# Patient Record
Sex: MALE | NOT HISPANIC OR LATINO | Employment: OTHER | URBAN - METROPOLITAN AREA
[De-identification: names, ages, dates, MRNs, and addresses within clinical notes are randomized per-mention and may not be internally consistent; named-entity substitution may affect disease eponyms.]

---

## 2017-02-08 ENCOUNTER — APPOINTMENT (EMERGENCY)
Dept: RADIOLOGY | Facility: HOSPITAL | Age: 82
End: 2017-02-08
Payer: COMMERCIAL

## 2017-02-08 ENCOUNTER — HOSPITAL ENCOUNTER (EMERGENCY)
Facility: HOSPITAL | Age: 82
Discharge: HOME | End: 2017-02-08
Attending: EMERGENCY MEDICINE | Admitting: EMERGENCY MEDICINE
Payer: COMMERCIAL

## 2017-02-08 VITALS
WEIGHT: 180 LBS | BODY MASS INDEX: 28.19 KG/M2 | HEART RATE: 69 BPM | TEMPERATURE: 98.1 F | RESPIRATION RATE: 16 BRPM | OXYGEN SATURATION: 97 % | SYSTOLIC BLOOD PRESSURE: 157 MMHG | DIASTOLIC BLOOD PRESSURE: 73 MMHG

## 2017-02-08 DIAGNOSIS — I47.1 PAROXYSMAL SVT (SUPRAVENTRICULAR TACHYCARDIA) (HCC): Primary | ICD-10-CM

## 2017-02-08 LAB
ALBUMIN SERPL BCP-MCNC: 3.4 G/DL (ref 3.5–5)
ALP SERPL-CCNC: 85 U/L (ref 46–116)
ALT SERPL W P-5'-P-CCNC: 46 U/L (ref 12–78)
ANION GAP SERPL CALCULATED.3IONS-SCNC: 12 MMOL/L (ref 4–13)
APTT PPP: 30 SECONDS (ref 24–36)
AST SERPL W P-5'-P-CCNC: 25 U/L (ref 5–45)
BASOPHILS # BLD AUTO: 0.03 THOUSANDS/ΜL (ref 0–0.1)
BASOPHILS NFR BLD AUTO: 0 % (ref 0–1)
BILIRUB SERPL-MCNC: 0.6 MG/DL (ref 0.2–1)
BUN SERPL-MCNC: 31 MG/DL (ref 5–25)
CALCIUM SERPL-MCNC: 9.2 MG/DL (ref 8.3–10.1)
CHLORIDE SERPL-SCNC: 103 MMOL/L (ref 100–108)
CO2 SERPL-SCNC: 25 MMOL/L (ref 21–32)
CREAT SERPL-MCNC: 1.95 MG/DL (ref 0.6–1.3)
EOSINOPHIL # BLD AUTO: 0.23 THOUSAND/ΜL (ref 0–0.61)
EOSINOPHIL NFR BLD AUTO: 2 % (ref 0–6)
ERYTHROCYTE [DISTWIDTH] IN BLOOD BY AUTOMATED COUNT: 12.8 % (ref 11.6–15.1)
GFR SERPL CREATININE-BSD FRML MDRD: 33.2 ML/MIN/1.73SQ M
GLUCOSE SERPL-MCNC: 312 MG/DL (ref 65–140)
HCT VFR BLD AUTO: 46.4 % (ref 36.5–49.3)
HGB BLD-MCNC: 16.7 G/DL (ref 12–17)
INR PPP: 1.09 (ref 0.86–1.16)
LYMPHOCYTES # BLD AUTO: 1.53 THOUSANDS/ΜL (ref 0.6–4.47)
LYMPHOCYTES NFR BLD AUTO: 13 % (ref 14–44)
MCH RBC QN AUTO: 32.6 PG (ref 26.8–34.3)
MCHC RBC AUTO-ENTMCNC: 36 G/DL (ref 31.4–37.4)
MCV RBC AUTO: 90 FL (ref 82–98)
MONOCYTES # BLD AUTO: 1.01 THOUSAND/ΜL (ref 0.17–1.22)
MONOCYTES NFR BLD AUTO: 8 % (ref 4–12)
NEUTROPHILS # BLD AUTO: 9.25 THOUSANDS/ΜL (ref 1.85–7.62)
NEUTS SEG NFR BLD AUTO: 77 % (ref 43–75)
NRBC BLD AUTO-RTO: 0 /100 WBCS
PLATELET # BLD AUTO: 189 THOUSANDS/UL (ref 149–390)
PMV BLD AUTO: 10 FL (ref 8.9–12.7)
POTASSIUM SERPL-SCNC: 4.3 MMOL/L (ref 3.5–5.3)
PROT SERPL-MCNC: 7.2 G/DL (ref 6.4–8.2)
PROTHROMBIN TIME: 14.1 SECONDS (ref 12–14.3)
RBC # BLD AUTO: 5.13 MILLION/UL (ref 3.88–5.62)
SODIUM SERPL-SCNC: 140 MMOL/L (ref 136–145)
SPECIMEN SOURCE: NORMAL
TROPONIN I BLD-MCNC: 0.01 NG/ML (ref 0–0.08)
WBC # BLD AUTO: 12.05 THOUSAND/UL (ref 4.31–10.16)

## 2017-02-08 PROCEDURE — 80053 COMPREHEN METABOLIC PANEL: CPT | Performed by: EMERGENCY MEDICINE

## 2017-02-08 PROCEDURE — 84484 ASSAY OF TROPONIN QUANT: CPT

## 2017-02-08 PROCEDURE — 36415 COLL VENOUS BLD VENIPUNCTURE: CPT | Performed by: EMERGENCY MEDICINE

## 2017-02-08 PROCEDURE — 93005 ELECTROCARDIOGRAM TRACING: CPT | Performed by: EMERGENCY MEDICINE

## 2017-02-08 PROCEDURE — 99285 EMERGENCY DEPT VISIT HI MDM: CPT

## 2017-02-08 PROCEDURE — 85025 COMPLETE CBC W/AUTO DIFF WBC: CPT | Performed by: EMERGENCY MEDICINE

## 2017-02-08 PROCEDURE — 85610 PROTHROMBIN TIME: CPT | Performed by: EMERGENCY MEDICINE

## 2017-02-08 PROCEDURE — 85730 THROMBOPLASTIN TIME PARTIAL: CPT | Performed by: EMERGENCY MEDICINE

## 2017-02-08 PROCEDURE — 71010 HB CHEST X-RAY 1 VIEW FRONTAL: CPT

## 2017-02-09 LAB
ATRIAL RATE: 241 BPM
QRS AXIS: -42 DEGREES
QRSD INTERVAL: 88 MS
QT INTERVAL: 260 MS
QTC INTERVAL: 422 MS
T WAVE AXIS: 49 DEGREES
VENTRICULAR RATE: 159 BPM

## 2017-02-11 ENCOUNTER — APPOINTMENT (OUTPATIENT)
Dept: LAB | Facility: MEDICAL CENTER | Age: 82
End: 2017-02-11
Payer: COMMERCIAL

## 2017-02-11 DIAGNOSIS — E78.5 HYPERLIPIDEMIA: ICD-10-CM

## 2017-02-11 DIAGNOSIS — I25.10 ATHEROSCLEROTIC HEART DISEASE OF NATIVE CORONARY ARTERY WITHOUT ANGINA PECTORIS: ICD-10-CM

## 2017-02-11 DIAGNOSIS — I10 ESSENTIAL (PRIMARY) HYPERTENSION: ICD-10-CM

## 2017-02-11 DIAGNOSIS — E11.40 TYPE 2 DIABETES MELLITUS WITH DIABETIC NEUROPATHY (HCC): ICD-10-CM

## 2017-02-11 DIAGNOSIS — E11.21 TYPE 2 DIABETES MELLITUS WITH DIABETIC NEPHROPATHY (HCC): ICD-10-CM

## 2017-02-11 DIAGNOSIS — N18.30 CHRONIC KIDNEY DISEASE, STAGE III (MODERATE) (HCC): ICD-10-CM

## 2017-02-11 LAB
ALBUMIN SERPL BCP-MCNC: 3.9 G/DL (ref 3.5–5)
ALP SERPL-CCNC: 84 U/L (ref 46–116)
ALT SERPL W P-5'-P-CCNC: 38 U/L (ref 12–78)
ANION GAP SERPL CALCULATED.3IONS-SCNC: 6 MMOL/L (ref 4–13)
AST SERPL W P-5'-P-CCNC: 17 U/L (ref 5–45)
BILIRUB SERPL-MCNC: 0.83 MG/DL (ref 0.2–1)
BUN SERPL-MCNC: 30 MG/DL (ref 5–25)
CALCIUM SERPL-MCNC: 9.1 MG/DL (ref 8.3–10.1)
CHLORIDE SERPL-SCNC: 102 MMOL/L (ref 100–108)
CHOLEST SERPL-MCNC: 131 MG/DL (ref 50–200)
CO2 SERPL-SCNC: 30 MMOL/L (ref 21–32)
CREAT SERPL-MCNC: 1.62 MG/DL (ref 0.6–1.3)
CREAT UR-MCNC: 91.7 MG/DL
EST. AVERAGE GLUCOSE BLD GHB EST-MCNC: 174 MG/DL
GFR SERPL CREATININE-BSD FRML MDRD: 41.1 ML/MIN/1.73SQ M
GLUCOSE SERPL-MCNC: 238 MG/DL (ref 65–140)
HBA1C MFR BLD: 7.7 % (ref 4.2–6.3)
HDLC SERPL-MCNC: 53 MG/DL (ref 40–60)
LDLC SERPL CALC-MCNC: 65 MG/DL (ref 0–100)
MICROALBUMIN UR-MCNC: 47.7 MG/L (ref 0–20)
MICROALBUMIN/CREAT 24H UR: 52 MG/G CREATININE (ref 0–30)
POTASSIUM SERPL-SCNC: 4.1 MMOL/L (ref 3.5–5.3)
PROT SERPL-MCNC: 7.5 G/DL (ref 6.4–8.2)
SODIUM SERPL-SCNC: 138 MMOL/L (ref 136–145)
TRIGL SERPL-MCNC: 65 MG/DL

## 2017-02-11 PROCEDURE — 36415 COLL VENOUS BLD VENIPUNCTURE: CPT

## 2017-02-11 PROCEDURE — 80061 LIPID PANEL: CPT

## 2017-02-11 PROCEDURE — 83036 HEMOGLOBIN GLYCOSYLATED A1C: CPT

## 2017-02-11 PROCEDURE — 82043 UR ALBUMIN QUANTITATIVE: CPT

## 2017-02-11 PROCEDURE — 82570 ASSAY OF URINE CREATININE: CPT

## 2017-02-11 PROCEDURE — 80053 COMPREHEN METABOLIC PANEL: CPT

## 2017-02-13 ENCOUNTER — APPOINTMENT (EMERGENCY)
Dept: RADIOLOGY | Facility: HOSPITAL | Age: 82
DRG: 309 | End: 2017-02-13
Payer: COMMERCIAL

## 2017-02-13 ENCOUNTER — HOSPITAL ENCOUNTER (INPATIENT)
Facility: HOSPITAL | Age: 82
LOS: 1 days | Discharge: HOME | DRG: 309 | End: 2017-02-14
Attending: EMERGENCY MEDICINE | Admitting: INTERNAL MEDICINE
Payer: COMMERCIAL

## 2017-02-13 DIAGNOSIS — R07.9 CHEST PAIN: ICD-10-CM

## 2017-02-13 DIAGNOSIS — I47.1 SVT (SUPRAVENTRICULAR TACHYCARDIA) (HCC): Primary | ICD-10-CM

## 2017-02-13 DIAGNOSIS — I44.0 HEART BLOCK AV FIRST DEGREE: ICD-10-CM

## 2017-02-13 DIAGNOSIS — I95.9 TRANSIENT HYPOTENSION: ICD-10-CM

## 2017-02-13 PROBLEM — N17.9 AKI (ACUTE KIDNEY INJURY) (HCC): Status: ACTIVE | Noted: 2017-02-13

## 2017-02-13 LAB
ALBUMIN SERPL BCP-MCNC: 3.4 G/DL (ref 3.5–5)
ALP SERPL-CCNC: 83 U/L (ref 46–116)
ALT SERPL W P-5'-P-CCNC: 37 U/L (ref 12–78)
ANION GAP SERPL CALCULATED.3IONS-SCNC: 9 MMOL/L (ref 4–13)
AST SERPL W P-5'-P-CCNC: 18 U/L (ref 5–45)
ATRIAL RATE: 576 BPM
ATRIAL RATE: 87 BPM
BASOPHILS # BLD AUTO: 0.02 THOUSANDS/ΜL (ref 0–0.1)
BASOPHILS NFR BLD AUTO: 0 % (ref 0–1)
BILIRUB SERPL-MCNC: 0.68 MG/DL (ref 0.2–1)
BUN SERPL-MCNC: 31 MG/DL (ref 5–25)
CALCIUM SERPL-MCNC: 9.1 MG/DL (ref 8.3–10.1)
CHLORIDE SERPL-SCNC: 103 MMOL/L (ref 100–108)
CO2 SERPL-SCNC: 29 MMOL/L (ref 21–32)
CREAT SERPL-MCNC: 1.83 MG/DL (ref 0.6–1.3)
CREAT UR-MCNC: 73.7 MG/DL
EOSINOPHIL # BLD AUTO: 0.23 THOUSAND/ΜL (ref 0–0.61)
EOSINOPHIL NFR BLD AUTO: 2 % (ref 0–6)
ERYTHROCYTE [DISTWIDTH] IN BLOOD BY AUTOMATED COUNT: 12.7 % (ref 11.6–15.1)
GFR SERPL CREATININE-BSD FRML MDRD: 35.7 ML/MIN/1.73SQ M
GLUCOSE SERPL-MCNC: 173 MG/DL (ref 65–140)
GLUCOSE SERPL-MCNC: 209 MG/DL (ref 65–140)
GLUCOSE SERPL-MCNC: 243 MG/DL (ref 65–140)
GLUCOSE SERPL-MCNC: 286 MG/DL (ref 65–140)
HCT VFR BLD AUTO: 46.4 % (ref 36.5–49.3)
HGB BLD-MCNC: 16.7 G/DL (ref 12–17)
LYMPHOCYTES # BLD AUTO: 1.47 THOUSANDS/ΜL (ref 0.6–4.47)
LYMPHOCYTES NFR BLD AUTO: 15 % (ref 14–44)
MCH RBC QN AUTO: 32.8 PG (ref 26.8–34.3)
MCHC RBC AUTO-ENTMCNC: 36 G/DL (ref 31.4–37.4)
MCV RBC AUTO: 91 FL (ref 82–98)
MONOCYTES # BLD AUTO: 0.85 THOUSAND/ΜL (ref 0.17–1.22)
MONOCYTES NFR BLD AUTO: 8 % (ref 4–12)
NEUTROPHILS # BLD AUTO: 7.55 THOUSANDS/ΜL (ref 1.85–7.62)
NEUTS SEG NFR BLD AUTO: 75 % (ref 43–75)
NRBC BLD AUTO-RTO: 0 /100 WBCS
P AXIS: 42 DEGREES
PLATELET # BLD AUTO: 171 THOUSANDS/UL (ref 149–390)
PMV BLD AUTO: 9.9 FL (ref 8.9–12.7)
POTASSIUM SERPL-SCNC: 4.3 MMOL/L (ref 3.5–5.3)
PR INTERVAL: 250 MS
PROT SERPL-MCNC: 7.2 G/DL (ref 6.4–8.2)
QRS AXIS: -27 DEGREES
QRS AXIS: -39 DEGREES
QRSD INTERVAL: 96 MS
QRSD INTERVAL: 98 MS
QT INTERVAL: 270 MS
QT INTERVAL: 332 MS
QTC INTERVAL: 399 MS
QTC INTERVAL: 426 MS
RBC # BLD AUTO: 5.09 MILLION/UL (ref 3.88–5.62)
SODIUM 24H UR-SCNC: 113 MOL/L
SODIUM SERPL-SCNC: 141 MMOL/L (ref 136–145)
SPECIMEN SOURCE: NORMAL
T WAVE AXIS: 12 DEGREES
T WAVE AXIS: 24 DEGREES
TROPONIN I BLD-MCNC: 0 NG/ML (ref 0–0.08)
TROPONIN I SERPL-MCNC: 0.02 NG/ML
TROPONIN I SERPL-MCNC: 0.03 NG/ML
VENTRICULAR RATE: 150 BPM
VENTRICULAR RATE: 87 BPM
WBC # BLD AUTO: 10.12 THOUSAND/UL (ref 4.31–10.16)

## 2017-02-13 PROCEDURE — 82948 REAGENT STRIP/BLOOD GLUCOSE: CPT

## 2017-02-13 PROCEDURE — 84484 ASSAY OF TROPONIN QUANT: CPT

## 2017-02-13 PROCEDURE — 93005 ELECTROCARDIOGRAM TRACING: CPT

## 2017-02-13 PROCEDURE — 82570 ASSAY OF URINE CREATININE: CPT | Performed by: PHYSICIAN ASSISTANT

## 2017-02-13 PROCEDURE — 80053 COMPREHEN METABOLIC PANEL: CPT | Performed by: EMERGENCY MEDICINE

## 2017-02-13 PROCEDURE — 99285 EMERGENCY DEPT VISIT HI MDM: CPT

## 2017-02-13 PROCEDURE — 84300 ASSAY OF URINE SODIUM: CPT | Performed by: PHYSICIAN ASSISTANT

## 2017-02-13 PROCEDURE — 85025 COMPLETE CBC W/AUTO DIFF WBC: CPT | Performed by: EMERGENCY MEDICINE

## 2017-02-13 PROCEDURE — 36415 COLL VENOUS BLD VENIPUNCTURE: CPT | Performed by: EMERGENCY MEDICINE

## 2017-02-13 PROCEDURE — 84484 ASSAY OF TROPONIN QUANT: CPT | Performed by: PHYSICIAN ASSISTANT

## 2017-02-13 PROCEDURE — 96374 THER/PROPH/DIAG INJ IV PUSH: CPT

## 2017-02-13 PROCEDURE — 71020 HB CHEST X-RAY 2VW FRONTAL&LATL: CPT

## 2017-02-13 PROCEDURE — 93005 ELECTROCARDIOGRAM TRACING: CPT | Performed by: EMERGENCY MEDICINE

## 2017-02-13 RX ORDER — ALPRAZOLAM 0.5 MG/1
1 TABLET ORAL DAILY
Status: DISCONTINUED | OUTPATIENT
Start: 2017-02-13 | End: 2017-02-14 | Stop reason: HOSPADM

## 2017-02-13 RX ORDER — ATORVASTATIN CALCIUM 40 MG/1
40 TABLET, FILM COATED ORAL
Status: DISCONTINUED | OUTPATIENT
Start: 2017-02-13 | End: 2017-02-14 | Stop reason: HOSPADM

## 2017-02-13 RX ORDER — HEPARIN SODIUM 5000 [USP'U]/ML
5000 INJECTION, SOLUTION INTRAVENOUS; SUBCUTANEOUS EVERY 8 HOURS SCHEDULED
Status: DISCONTINUED | OUTPATIENT
Start: 2017-02-13 | End: 2017-02-13

## 2017-02-13 RX ORDER — CALCIUM CARBONATE 200(500)MG
1000 TABLET,CHEWABLE ORAL DAILY PRN
Status: DISCONTINUED | OUTPATIENT
Start: 2017-02-13 | End: 2017-02-14 | Stop reason: HOSPADM

## 2017-02-13 RX ORDER — HYDROCODONE BITARTRATE AND ACETAMINOPHEN 5; 325 MG/1; MG/1
1 TABLET ORAL EVERY 4 HOURS PRN
Status: DISCONTINUED | OUTPATIENT
Start: 2017-02-13 | End: 2017-02-14 | Stop reason: HOSPADM

## 2017-02-13 RX ORDER — ADENOSINE 3 MG/ML
12 INJECTION INTRAVENOUS ONCE
Status: COMPLETED | OUTPATIENT
Start: 2017-02-13 | End: 2017-02-13

## 2017-02-13 RX ORDER — ONDANSETRON 2 MG/ML
4 INJECTION INTRAMUSCULAR; INTRAVENOUS EVERY 6 HOURS PRN
Status: DISCONTINUED | OUTPATIENT
Start: 2017-02-13 | End: 2017-02-14 | Stop reason: HOSPADM

## 2017-02-13 RX ORDER — SODIUM CHLORIDE 9 MG/ML
75 INJECTION, SOLUTION INTRAVENOUS CONTINUOUS
Status: DISCONTINUED | OUTPATIENT
Start: 2017-02-13 | End: 2017-02-14 | Stop reason: HOSPADM

## 2017-02-13 RX ORDER — CHLORAL HYDRATE 500 MG
1000 CAPSULE ORAL DAILY
Status: DISCONTINUED | OUTPATIENT
Start: 2017-02-13 | End: 2017-02-14 | Stop reason: HOSPADM

## 2017-02-13 RX ORDER — ACETAMINOPHEN 325 MG/1
650 TABLET ORAL EVERY 6 HOURS PRN
Status: DISCONTINUED | OUTPATIENT
Start: 2017-02-13 | End: 2017-02-14 | Stop reason: HOSPADM

## 2017-02-13 RX ORDER — IRBESARTAN 150 MG/1
300 TABLET ORAL
Status: DISCONTINUED | OUTPATIENT
Start: 2017-02-13 | End: 2017-02-14 | Stop reason: HOSPADM

## 2017-02-13 RX ORDER — NITROGLYCERIN 0.4 MG/1
0.4 TABLET SUBLINGUAL
Status: DISCONTINUED | OUTPATIENT
Start: 2017-02-13 | End: 2017-02-14 | Stop reason: HOSPADM

## 2017-02-13 RX ORDER — ASPIRIN 81 MG/1
81 TABLET, CHEWABLE ORAL DAILY
Status: DISCONTINUED | OUTPATIENT
Start: 2017-02-14 | End: 2017-02-14 | Stop reason: HOSPADM

## 2017-02-13 RX ORDER — HEPARIN SODIUM 5000 [USP'U]/ML
5000 INJECTION, SOLUTION INTRAVENOUS; SUBCUTANEOUS EVERY 8 HOURS SCHEDULED
Status: DISCONTINUED | OUTPATIENT
Start: 2017-02-13 | End: 2017-02-14 | Stop reason: HOSPADM

## 2017-02-13 RX ORDER — DOCUSATE SODIUM 100 MG/1
100 CAPSULE, LIQUID FILLED ORAL 2 TIMES DAILY
Status: DISCONTINUED | OUTPATIENT
Start: 2017-02-13 | End: 2017-02-14 | Stop reason: HOSPADM

## 2017-02-13 RX ORDER — ACETAMINOPHEN 325 MG/1
650 TABLET ORAL EVERY 4 HOURS PRN
Status: DISCONTINUED | OUTPATIENT
Start: 2017-02-13 | End: 2017-02-14 | Stop reason: HOSPADM

## 2017-02-13 RX ADMIN — ADENOSINE 6 MG: 3 INJECTION, SOLUTION INTRAVENOUS at 09:25

## 2017-02-13 RX ADMIN — SODIUM CHLORIDE 1000 ML: 0.9 INJECTION, SOLUTION INTRAVENOUS at 09:28

## 2017-02-13 RX ADMIN — INSULIN LISPRO 1 UNITS: 100 INJECTION, SOLUTION INTRAVENOUS; SUBCUTANEOUS at 18:32

## 2017-02-13 RX ADMIN — INSULIN LISPRO 1 UNITS: 100 INJECTION, SOLUTION INTRAVENOUS; SUBCUTANEOUS at 22:00

## 2017-02-13 RX ADMIN — DOCUSATE SODIUM 100 MG: 100 CAPSULE, LIQUID FILLED ORAL at 18:32

## 2017-02-13 RX ADMIN — SODIUM CHLORIDE 75 ML/HR: 0.9 INJECTION, SOLUTION INTRAVENOUS at 13:44

## 2017-02-13 RX ADMIN — METOPROLOL TARTRATE 25 MG: 25 TABLET ORAL at 09:40

## 2017-02-13 RX ADMIN — IRBESARTAN 300 MG: 150 TABLET ORAL at 20:21

## 2017-02-13 RX ADMIN — HEPARIN SODIUM 5000 UNITS: 5000 INJECTION, SOLUTION INTRAVENOUS; SUBCUTANEOUS at 13:54

## 2017-02-13 RX ADMIN — ATORVASTATIN CALCIUM 40 MG: 40 TABLET, FILM COATED ORAL at 18:32

## 2017-02-13 RX ADMIN — HEPARIN SODIUM 5000 UNITS: 5000 INJECTION, SOLUTION INTRAVENOUS; SUBCUTANEOUS at 22:00

## 2017-02-14 VITALS
TEMPERATURE: 97.8 F | BODY MASS INDEX: 30.32 KG/M2 | RESPIRATION RATE: 18 BRPM | WEIGHT: 193.56 LBS | SYSTOLIC BLOOD PRESSURE: 160 MMHG | DIASTOLIC BLOOD PRESSURE: 80 MMHG | HEART RATE: 66 BPM | OXYGEN SATURATION: 97 %

## 2017-02-14 LAB
ALBUMIN SERPL BCP-MCNC: 2.8 G/DL (ref 3.5–5)
ALP SERPL-CCNC: 71 U/L (ref 46–116)
ALT SERPL W P-5'-P-CCNC: 30 U/L (ref 12–78)
ANION GAP SERPL CALCULATED.3IONS-SCNC: 7 MMOL/L (ref 4–13)
AST SERPL W P-5'-P-CCNC: 19 U/L (ref 5–45)
BILIRUB SERPL-MCNC: 0.7 MG/DL (ref 0.2–1)
BUN SERPL-MCNC: 26 MG/DL (ref 5–25)
CALCIUM SERPL-MCNC: 8.4 MG/DL (ref 8.3–10.1)
CHLORIDE SERPL-SCNC: 108 MMOL/L (ref 100–108)
CHOLEST SERPL-MCNC: 108 MG/DL (ref 50–200)
CO2 SERPL-SCNC: 29 MMOL/L (ref 21–32)
CREAT SERPL-MCNC: 1.36 MG/DL (ref 0.6–1.3)
GFR SERPL CREATININE-BSD FRML MDRD: 50.3 ML/MIN/1.73SQ M
GLUCOSE SERPL-MCNC: 130 MG/DL (ref 65–140)
GLUCOSE SERPL-MCNC: 151 MG/DL (ref 65–140)
HDLC SERPL-MCNC: 41 MG/DL (ref 40–60)
LDLC SERPL CALC-MCNC: 49 MG/DL (ref 0–100)
MAGNESIUM SERPL-MCNC: 1.7 MG/DL (ref 1.6–2.6)
POTASSIUM SERPL-SCNC: 4.2 MMOL/L (ref 3.5–5.3)
PROT SERPL-MCNC: 6.1 G/DL (ref 6.4–8.2)
SODIUM SERPL-SCNC: 144 MMOL/L (ref 136–145)
TRIGL SERPL-MCNC: 88 MG/DL

## 2017-02-14 PROCEDURE — 80053 COMPREHEN METABOLIC PANEL: CPT | Performed by: PHYSICIAN ASSISTANT

## 2017-02-14 PROCEDURE — 82948 REAGENT STRIP/BLOOD GLUCOSE: CPT

## 2017-02-14 PROCEDURE — 80061 LIPID PANEL: CPT | Performed by: PHYSICIAN ASSISTANT

## 2017-02-14 PROCEDURE — 83735 ASSAY OF MAGNESIUM: CPT | Performed by: PHYSICIAN ASSISTANT

## 2017-02-14 RX ORDER — GLIMEPIRIDE 2 MG/1
4 TABLET ORAL
Status: DISCONTINUED | OUTPATIENT
Start: 2017-02-14 | End: 2017-02-14 | Stop reason: HOSPADM

## 2017-02-14 RX ORDER — IRBESARTAN 150 MG/1
300 TABLET ORAL DAILY
Status: DISCONTINUED | OUTPATIENT
Start: 2017-02-14 | End: 2017-02-14

## 2017-02-14 RX ORDER — MELATONIN
2000 DAILY
Status: DISCONTINUED | OUTPATIENT
Start: 2017-02-14 | End: 2017-02-14 | Stop reason: HOSPADM

## 2017-02-14 RX ADMIN — HEPARIN SODIUM 5000 UNITS: 5000 INJECTION, SOLUTION INTRAVENOUS; SUBCUTANEOUS at 05:42

## 2017-02-14 RX ADMIN — ASPIRIN 81 MG 81 MG: 81 TABLET ORAL at 08:27

## 2017-02-14 RX ADMIN — INSULIN LISPRO 1 UNITS: 100 INJECTION, SOLUTION INTRAVENOUS; SUBCUTANEOUS at 08:26

## 2017-02-14 RX ADMIN — ALPRAZOLAM 1 MG: 0.5 TABLET ORAL at 08:27

## 2017-02-14 RX ADMIN — SODIUM CHLORIDE 75 ML/HR: 0.9 INJECTION, SOLUTION INTRAVENOUS at 03:04

## 2017-02-14 RX ADMIN — SITAGLIPTIN 50 MG: 100 TABLET, FILM COATED ORAL at 08:27

## 2017-02-14 RX ADMIN — Medication 1000 MG: at 08:27

## 2017-02-14 RX ADMIN — GLIMEPIRIDE 4 MG: 2 TABLET ORAL at 10:35

## 2017-02-14 RX ADMIN — VITAMIN D, TAB 1000IU (100/BT) 2000 UNITS: 25 TAB at 10:36

## 2017-02-15 ENCOUNTER — GENERIC CONVERSION - ENCOUNTER (OUTPATIENT)
Dept: OTHER | Facility: OTHER | Age: 82
End: 2017-02-15

## 2017-02-15 ENCOUNTER — ALLSCRIPTS OFFICE VISIT (OUTPATIENT)
Dept: OTHER | Facility: OTHER | Age: 82
End: 2017-02-15

## 2017-02-23 ENCOUNTER — GENERIC CONVERSION - ENCOUNTER (OUTPATIENT)
Dept: OTHER | Facility: OTHER | Age: 82
End: 2017-02-23

## 2017-02-25 ENCOUNTER — APPOINTMENT (OUTPATIENT)
Dept: LAB | Facility: MEDICAL CENTER | Age: 82
End: 2017-02-25
Payer: COMMERCIAL

## 2017-02-25 ENCOUNTER — TRANSCRIBE ORDERS (OUTPATIENT)
Dept: ADMINISTRATIVE | Facility: HOSPITAL | Age: 82
End: 2017-02-25

## 2017-02-25 DIAGNOSIS — N18.30 CHRONIC KIDNEY DISEASE, STAGE III (MODERATE) (HCC): ICD-10-CM

## 2017-02-25 DIAGNOSIS — M17.12 PRIMARY OSTEOARTHRITIS OF LEFT KNEE: ICD-10-CM

## 2017-02-25 DIAGNOSIS — R41.3 OTHER AMNESIA: ICD-10-CM

## 2017-02-25 LAB
25(OH)D3 SERPL-MCNC: 28.5 NG/ML (ref 30–100)
FOLATE SERPL-MCNC: >20 NG/ML (ref 3.1–17.5)
TSH SERPL DL<=0.05 MIU/L-ACNC: 0.74 UIU/ML (ref 0.36–3.74)
VIT B12 SERPL-MCNC: 791 PG/ML (ref 100–900)

## 2017-02-25 PROCEDURE — 84443 ASSAY THYROID STIM HORMONE: CPT

## 2017-02-25 PROCEDURE — 82607 VITAMIN B-12: CPT

## 2017-02-25 PROCEDURE — 36415 COLL VENOUS BLD VENIPUNCTURE: CPT

## 2017-02-25 PROCEDURE — 82306 VITAMIN D 25 HYDROXY: CPT

## 2017-02-25 PROCEDURE — 82746 ASSAY OF FOLIC ACID SERUM: CPT

## 2017-02-26 ENCOUNTER — GENERIC CONVERSION - ENCOUNTER (OUTPATIENT)
Dept: OTHER | Facility: OTHER | Age: 82
End: 2017-02-26

## 2017-03-01 ENCOUNTER — ALLSCRIPTS OFFICE VISIT (OUTPATIENT)
Dept: OTHER | Facility: OTHER | Age: 82
End: 2017-03-01

## 2017-03-01 ENCOUNTER — HOSPITAL ENCOUNTER (OUTPATIENT)
Dept: RADIOLOGY | Facility: HOSPITAL | Age: 82
Discharge: HOME | End: 2017-03-01
Attending: ORTHOPAEDIC SURGERY
Payer: COMMERCIAL

## 2017-03-01 DIAGNOSIS — Z47.1 AFTERCARE FOLLOWING JOINT REPLACEMENT SURGERY: ICD-10-CM

## 2017-03-01 PROCEDURE — 73502 X-RAY EXAM HIP UNI 2-3 VIEWS: CPT

## 2017-03-06 ENCOUNTER — HOSPITAL ENCOUNTER (EMERGENCY)
Facility: HOSPITAL | Age: 82
Discharge: HOME | End: 2017-03-06
Attending: EMERGENCY MEDICINE | Admitting: EMERGENCY MEDICINE
Payer: COMMERCIAL

## 2017-03-06 ENCOUNTER — APPOINTMENT (EMERGENCY)
Dept: RADIOLOGY | Facility: HOSPITAL | Age: 82
End: 2017-03-06
Payer: COMMERCIAL

## 2017-03-06 VITALS
SYSTOLIC BLOOD PRESSURE: 163 MMHG | OXYGEN SATURATION: 98 % | HEART RATE: 65 BPM | WEIGHT: 175 LBS | DIASTOLIC BLOOD PRESSURE: 72 MMHG | TEMPERATURE: 97.7 F | BODY MASS INDEX: 27.41 KG/M2 | RESPIRATION RATE: 18 BRPM

## 2017-03-06 DIAGNOSIS — I47.1 PSVT (PAROXYSMAL SUPRAVENTRICULAR TACHYCARDIA) (HCC): Primary | ICD-10-CM

## 2017-03-06 DIAGNOSIS — R42 DIZZINESS: ICD-10-CM

## 2017-03-06 LAB
ANION GAP SERPL CALCULATED.3IONS-SCNC: 7 MMOL/L (ref 4–13)
ATRIAL RATE: 83 BPM
BASOPHILS # BLD AUTO: 0.02 THOUSANDS/ΜL (ref 0–0.1)
BASOPHILS NFR BLD AUTO: 0 % (ref 0–1)
BUN SERPL-MCNC: 25 MG/DL (ref 5–25)
CALCIUM SERPL-MCNC: 9.1 MG/DL (ref 8.3–10.1)
CHLORIDE SERPL-SCNC: 103 MMOL/L (ref 100–108)
CO2 SERPL-SCNC: 30 MMOL/L (ref 21–32)
CREAT SERPL-MCNC: 1.57 MG/DL (ref 0.6–1.3)
EOSINOPHIL # BLD AUTO: 0.18 THOUSAND/ΜL (ref 0–0.61)
EOSINOPHIL NFR BLD AUTO: 2 % (ref 0–6)
ERYTHROCYTE [DISTWIDTH] IN BLOOD BY AUTOMATED COUNT: 12.5 % (ref 11.6–15.1)
GFR SERPL CREATININE-BSD FRML MDRD: 42.6 ML/MIN/1.73SQ M
GLUCOSE SERPL-MCNC: 295 MG/DL (ref 65–140)
HCT VFR BLD AUTO: 47.5 % (ref 36.5–49.3)
HGB BLD-MCNC: 16.3 G/DL (ref 12–17)
LYMPHOCYTES # BLD AUTO: 1.21 THOUSANDS/ΜL (ref 0.6–4.47)
LYMPHOCYTES NFR BLD AUTO: 13 % (ref 14–44)
MAGNESIUM SERPL-MCNC: 1.8 MG/DL (ref 1.6–2.6)
MCH RBC QN AUTO: 30.8 PG (ref 26.8–34.3)
MCHC RBC AUTO-ENTMCNC: 34.3 G/DL (ref 31.4–37.4)
MCV RBC AUTO: 90 FL (ref 82–98)
MONOCYTES # BLD AUTO: 0.85 THOUSAND/ΜL (ref 0.17–1.22)
MONOCYTES NFR BLD AUTO: 9 % (ref 4–12)
NEUTROPHILS # BLD AUTO: 6.92 THOUSANDS/ΜL (ref 1.85–7.62)
NEUTS SEG NFR BLD AUTO: 76 % (ref 43–75)
P AXIS: 39 DEGREES
PLATELET # BLD AUTO: 196 THOUSANDS/UL (ref 149–390)
PMV BLD AUTO: 10.1 FL (ref 8.9–12.7)
POTASSIUM SERPL-SCNC: 4.9 MMOL/L (ref 3.5–5.3)
PR INTERVAL: 264 MS
QRS AXIS: -31 DEGREES
QRSD INTERVAL: 98 MS
QT INTERVAL: 344 MS
QTC INTERVAL: 404 MS
RBC # BLD AUTO: 5.3 MILLION/UL (ref 3.88–5.62)
SODIUM SERPL-SCNC: 140 MMOL/L (ref 136–145)
SPECIMEN SOURCE: NORMAL
T WAVE AXIS: 39 DEGREES
TROPONIN I BLD-MCNC: 0.01 NG/ML (ref 0–0.08)
VENTRICULAR RATE: 83 BPM
WBC # BLD AUTO: 9.18 THOUSAND/UL (ref 4.31–10.16)

## 2017-03-06 PROCEDURE — 96361 HYDRATE IV INFUSION ADD-ON: CPT

## 2017-03-06 PROCEDURE — 96360 HYDRATION IV INFUSION INIT: CPT

## 2017-03-06 PROCEDURE — 80048 BASIC METABOLIC PNL TOTAL CA: CPT | Performed by: EMERGENCY MEDICINE

## 2017-03-06 PROCEDURE — 85025 COMPLETE CBC W/AUTO DIFF WBC: CPT | Performed by: EMERGENCY MEDICINE

## 2017-03-06 PROCEDURE — 93005 ELECTROCARDIOGRAM TRACING: CPT

## 2017-03-06 PROCEDURE — 83735 ASSAY OF MAGNESIUM: CPT | Performed by: EMERGENCY MEDICINE

## 2017-03-06 PROCEDURE — 84484 ASSAY OF TROPONIN QUANT: CPT

## 2017-03-06 PROCEDURE — 71020 HB CHEST X-RAY 2VW FRONTAL&LATL: CPT

## 2017-03-06 PROCEDURE — 99285 EMERGENCY DEPT VISIT HI MDM: CPT

## 2017-03-06 PROCEDURE — 36415 COLL VENOUS BLD VENIPUNCTURE: CPT | Performed by: EMERGENCY MEDICINE

## 2017-03-06 RX ADMIN — SODIUM CHLORIDE 1000 ML: 0.9 INJECTION, SOLUTION INTRAVENOUS at 10:28

## 2017-03-29 ENCOUNTER — GENERIC CONVERSION - ENCOUNTER (OUTPATIENT)
Dept: OTHER | Facility: OTHER | Age: 82
End: 2017-03-29

## 2017-04-17 ENCOUNTER — GENERIC CONVERSION - ENCOUNTER (OUTPATIENT)
Dept: OTHER | Facility: OTHER | Age: 82
End: 2017-04-17

## 2017-04-26 ENCOUNTER — GENERIC CONVERSION - ENCOUNTER (OUTPATIENT)
Dept: OTHER | Facility: OTHER | Age: 82
End: 2017-04-26

## 2017-05-15 DIAGNOSIS — I10 ESSENTIAL (PRIMARY) HYPERTENSION: ICD-10-CM

## 2017-05-15 DIAGNOSIS — I25.10 ATHEROSCLEROTIC HEART DISEASE OF NATIVE CORONARY ARTERY WITHOUT ANGINA PECTORIS: ICD-10-CM

## 2017-05-15 DIAGNOSIS — E11.21 TYPE 2 DIABETES MELLITUS WITH DIABETIC NEPHROPATHY (HCC): ICD-10-CM

## 2017-05-15 DIAGNOSIS — E11.40 TYPE 2 DIABETES MELLITUS WITH DIABETIC NEUROPATHY (HCC): ICD-10-CM

## 2017-05-15 DIAGNOSIS — N18.30 CHRONIC KIDNEY DISEASE, STAGE III (MODERATE) (HCC): ICD-10-CM

## 2017-05-17 ENCOUNTER — GENERIC CONVERSION - ENCOUNTER (OUTPATIENT)
Dept: OTHER | Facility: OTHER | Age: 82
End: 2017-05-17

## 2017-05-23 ENCOUNTER — APPOINTMENT (OUTPATIENT)
Dept: LAB | Facility: MEDICAL CENTER | Age: 82
End: 2017-05-23
Payer: COMMERCIAL

## 2017-05-23 ENCOUNTER — TRANSCRIBE ORDERS (OUTPATIENT)
Dept: ADMINISTRATIVE | Facility: HOSPITAL | Age: 82
End: 2017-05-23

## 2017-05-23 ENCOUNTER — GENERIC CONVERSION - ENCOUNTER (OUTPATIENT)
Dept: OTHER | Facility: OTHER | Age: 82
End: 2017-05-23

## 2017-05-23 DIAGNOSIS — E11.21 TYPE 2 DIABETES MELLITUS WITH DIABETIC NEPHROPATHY (HCC): ICD-10-CM

## 2017-05-23 DIAGNOSIS — E11.40 TYPE 2 DIABETES MELLITUS WITH DIABETIC NEUROPATHY (HCC): ICD-10-CM

## 2017-05-23 DIAGNOSIS — I10 ESSENTIAL (PRIMARY) HYPERTENSION: ICD-10-CM

## 2017-05-23 DIAGNOSIS — I25.10 ATHEROSCLEROTIC HEART DISEASE OF NATIVE CORONARY ARTERY WITHOUT ANGINA PECTORIS: ICD-10-CM

## 2017-05-23 DIAGNOSIS — N18.30 CHRONIC KIDNEY DISEASE, STAGE III (MODERATE) (HCC): ICD-10-CM

## 2017-05-23 LAB
ALBUMIN SERPL BCP-MCNC: 3.5 G/DL (ref 3.5–5)
ALP SERPL-CCNC: 84 U/L (ref 46–116)
ALT SERPL W P-5'-P-CCNC: 35 U/L (ref 12–78)
ANION GAP SERPL CALCULATED.3IONS-SCNC: 6 MMOL/L (ref 4–13)
AST SERPL W P-5'-P-CCNC: 20 U/L (ref 5–45)
BILIRUB SERPL-MCNC: 0.6 MG/DL (ref 0.2–1)
BUN SERPL-MCNC: 29 MG/DL (ref 5–25)
CALCIUM SERPL-MCNC: 9 MG/DL (ref 8.3–10.1)
CHLORIDE SERPL-SCNC: 106 MMOL/L (ref 100–108)
CO2 SERPL-SCNC: 26 MMOL/L (ref 21–32)
CREAT SERPL-MCNC: 1.51 MG/DL (ref 0.6–1.3)
GFR SERPL CREATININE-BSD FRML MDRD: 44.6 ML/MIN/1.73SQ M
GLUCOSE P FAST SERPL-MCNC: 181 MG/DL (ref 65–99)
POTASSIUM SERPL-SCNC: 4.5 MMOL/L (ref 3.5–5.3)
PROT SERPL-MCNC: 7.5 G/DL (ref 6.4–8.2)
SODIUM SERPL-SCNC: 138 MMOL/L (ref 136–145)

## 2017-05-23 PROCEDURE — 36415 COLL VENOUS BLD VENIPUNCTURE: CPT

## 2017-05-23 PROCEDURE — 80053 COMPREHEN METABOLIC PANEL: CPT

## 2017-05-30 ENCOUNTER — GENERIC CONVERSION - ENCOUNTER (OUTPATIENT)
Dept: OTHER | Facility: OTHER | Age: 82
End: 2017-05-30

## 2017-06-02 ENCOUNTER — GENERIC CONVERSION - ENCOUNTER (OUTPATIENT)
Dept: OTHER | Facility: OTHER | Age: 82
End: 2017-06-02

## 2017-06-14 ENCOUNTER — ALLSCRIPTS OFFICE VISIT (OUTPATIENT)
Dept: OTHER | Facility: OTHER | Age: 82
End: 2017-06-14

## 2017-06-29 ENCOUNTER — APPOINTMENT (EMERGENCY)
Dept: RADIOLOGY | Facility: HOSPITAL | Age: 82
End: 2017-06-29
Payer: COMMERCIAL

## 2017-06-29 ENCOUNTER — HOSPITAL ENCOUNTER (EMERGENCY)
Facility: HOSPITAL | Age: 82
Discharge: HOME | End: 2017-06-29
Attending: EMERGENCY MEDICINE | Admitting: EMERGENCY MEDICINE
Payer: COMMERCIAL

## 2017-06-29 VITALS
SYSTOLIC BLOOD PRESSURE: 114 MMHG | OXYGEN SATURATION: 97 % | BODY MASS INDEX: 28.73 KG/M2 | WEIGHT: 183.42 LBS | RESPIRATION RATE: 18 BRPM | TEMPERATURE: 97.7 F | DIASTOLIC BLOOD PRESSURE: 59 MMHG | HEART RATE: 66 BPM

## 2017-06-29 DIAGNOSIS — R00.2 PALPITATIONS: Primary | ICD-10-CM

## 2017-06-29 DIAGNOSIS — Z86.79 HISTORY OF PSVT (PAROXYSMAL SUPRAVENTRICULAR TACHYCARDIA): ICD-10-CM

## 2017-06-29 LAB
ALBUMIN SERPL BCP-MCNC: 3.8 G/DL (ref 3.5–5)
ALP SERPL-CCNC: 105 U/L (ref 46–116)
ALT SERPL W P-5'-P-CCNC: 31 U/L (ref 12–78)
ANION GAP SERPL CALCULATED.3IONS-SCNC: 11 MMOL/L (ref 4–13)
APTT PPP: 31 SECONDS (ref 23–35)
AST SERPL W P-5'-P-CCNC: 21 U/L (ref 5–45)
ATRIAL RATE: 144 BPM
ATRIAL RATE: 80 BPM
BASOPHILS # BLD AUTO: 0.05 THOUSANDS/ΜL (ref 0–0.1)
BASOPHILS NFR BLD AUTO: 0 % (ref 0–1)
BILIRUB SERPL-MCNC: 0.91 MG/DL (ref 0.2–1)
BUN SERPL-MCNC: 25 MG/DL (ref 5–25)
CALCIUM SERPL-MCNC: 9.6 MG/DL (ref 8.3–10.1)
CHLORIDE SERPL-SCNC: 100 MMOL/L (ref 100–108)
CO2 SERPL-SCNC: 27 MMOL/L (ref 21–32)
CREAT SERPL-MCNC: 1.85 MG/DL (ref 0.6–1.3)
EOSINOPHIL # BLD AUTO: 0.19 THOUSAND/ΜL (ref 0–0.61)
EOSINOPHIL NFR BLD AUTO: 2 % (ref 0–6)
ERYTHROCYTE [DISTWIDTH] IN BLOOD BY AUTOMATED COUNT: 12.8 % (ref 11.6–15.1)
GFR SERPL CREATININE-BSD FRML MDRD: 35.3 ML/MIN/1.73SQ M
GLUCOSE SERPL-MCNC: 280 MG/DL (ref 65–140)
HCT VFR BLD AUTO: 50.2 % (ref 36.5–49.3)
HGB BLD-MCNC: 18.3 G/DL (ref 12–17)
INR PPP: 1.08 (ref 0.86–1.16)
LYMPHOCYTES # BLD AUTO: 1.8 THOUSANDS/ΜL (ref 0.6–4.47)
LYMPHOCYTES NFR BLD AUTO: 15 % (ref 14–44)
MCH RBC QN AUTO: 32.7 PG (ref 26.8–34.3)
MCHC RBC AUTO-ENTMCNC: 36.5 G/DL (ref 31.4–37.4)
MCV RBC AUTO: 90 FL (ref 82–98)
MONOCYTES # BLD AUTO: 0.96 THOUSAND/ΜL (ref 0.17–1.22)
MONOCYTES NFR BLD AUTO: 8 % (ref 4–12)
NEUTROPHILS # BLD AUTO: 8.66 THOUSANDS/ΜL (ref 1.85–7.62)
NEUTS SEG NFR BLD AUTO: 75 % (ref 43–75)
NRBC BLD AUTO-RTO: 0 /100 WBCS
P AXIS: -18 DEGREES
P AXIS: 18 DEGREES
PLATELET # BLD AUTO: 188 THOUSANDS/UL (ref 149–390)
PMV BLD AUTO: 9.8 FL (ref 8.9–12.7)
POTASSIUM SERPL-SCNC: 3.8 MMOL/L (ref 3.5–5.3)
PR INTERVAL: 218 MS
PR INTERVAL: 242 MS
PROT SERPL-MCNC: 8.1 G/DL (ref 6.4–8.2)
PROTHROMBIN TIME: 14 SECONDS (ref 12.1–14.4)
QRS AXIS: -36 DEGREES
QRS AXIS: -39 DEGREES
QRSD INTERVAL: 100 MS
QRSD INTERVAL: 102 MS
QT INTERVAL: 292 MS
QT INTERVAL: 360 MS
QTC INTERVAL: 415 MS
QTC INTERVAL: 452 MS
RBC # BLD AUTO: 5.59 MILLION/UL (ref 3.88–5.62)
SODIUM SERPL-SCNC: 138 MMOL/L (ref 136–145)
SPECIMEN SOURCE: NORMAL
T WAVE AXIS: -5 DEGREES
T WAVE AXIS: 4 DEGREES
TROPONIN I BLD-MCNC: 0.01 NG/ML (ref 0–0.08)
VENTRICULAR RATE: 144 BPM
VENTRICULAR RATE: 80 BPM
WBC # BLD AUTO: 11.66 THOUSAND/UL (ref 4.31–10.16)

## 2017-06-29 PROCEDURE — 80053 COMPREHEN METABOLIC PANEL: CPT | Performed by: EMERGENCY MEDICINE

## 2017-06-29 PROCEDURE — 85610 PROTHROMBIN TIME: CPT | Performed by: EMERGENCY MEDICINE

## 2017-06-29 PROCEDURE — 85025 COMPLETE CBC W/AUTO DIFF WBC: CPT | Performed by: EMERGENCY MEDICINE

## 2017-06-29 PROCEDURE — 71020 HB CHEST X-RAY 2VW FRONTAL&LATL: CPT | Performed by: EMERGENCY MEDICINE

## 2017-06-29 PROCEDURE — 84484 ASSAY OF TROPONIN QUANT: CPT

## 2017-06-29 PROCEDURE — 99285 EMERGENCY DEPT VISIT HI MDM: CPT

## 2017-06-29 PROCEDURE — 96360 HYDRATION IV INFUSION INIT: CPT

## 2017-06-29 PROCEDURE — 36415 COLL VENOUS BLD VENIPUNCTURE: CPT

## 2017-06-29 PROCEDURE — 96361 HYDRATE IV INFUSION ADD-ON: CPT

## 2017-06-29 PROCEDURE — 85730 THROMBOPLASTIN TIME PARTIAL: CPT | Performed by: EMERGENCY MEDICINE

## 2017-06-29 PROCEDURE — 93005 ELECTROCARDIOGRAM TRACING: CPT | Performed by: EMERGENCY MEDICINE

## 2017-06-29 RX ORDER — LANOLIN ALCOHOL/MO/W.PET/CERES
3 CREAM (GRAM) TOPICAL
COMMUNITY

## 2017-06-29 RX ADMIN — SODIUM CHLORIDE 1000 ML: 0.9 INJECTION, SOLUTION INTRAVENOUS at 10:26

## 2017-07-18 ENCOUNTER — GENERIC CONVERSION - ENCOUNTER (OUTPATIENT)
Dept: OTHER | Facility: OTHER | Age: 82
End: 2017-07-18

## 2017-10-12 ENCOUNTER — APPOINTMENT (OUTPATIENT)
Dept: LAB | Facility: MEDICAL CENTER | Age: 82
End: 2017-10-12
Payer: COMMERCIAL

## 2017-10-12 ENCOUNTER — TRANSCRIBE ORDERS (OUTPATIENT)
Dept: ADMINISTRATIVE | Facility: HOSPITAL | Age: 82
End: 2017-10-12

## 2017-10-12 DIAGNOSIS — I10 ESSENTIAL (PRIMARY) HYPERTENSION: ICD-10-CM

## 2017-10-12 DIAGNOSIS — E11.40 TYPE 2 DIABETES MELLITUS WITH DIABETIC NEUROPATHY (HCC): ICD-10-CM

## 2017-10-12 DIAGNOSIS — E13.49 OTHER DIABETIC NEUROLOGICAL COMPLICATION ASSOCIATED WITH OTHER SPECIFIED DIABETES MELLITUS (HCC): ICD-10-CM

## 2017-10-12 DIAGNOSIS — N18.30 CHRONIC KIDNEY DISEASE, STAGE III (MODERATE) (HCC): ICD-10-CM

## 2017-10-12 DIAGNOSIS — E13.49 OTHER DIABETIC NEUROLOGICAL COMPLICATION ASSOCIATED WITH OTHER SPECIFIED DIABETES MELLITUS (HCC): Primary | ICD-10-CM

## 2017-10-12 LAB
ALBUMIN SERPL BCP-MCNC: 3.6 G/DL (ref 3.5–5)
ALP SERPL-CCNC: 95 U/L (ref 46–116)
ALT SERPL W P-5'-P-CCNC: 30 U/L (ref 12–78)
ANION GAP SERPL CALCULATED.3IONS-SCNC: 4 MMOL/L (ref 4–13)
AST SERPL W P-5'-P-CCNC: 17 U/L (ref 5–45)
BASOPHILS # BLD AUTO: 0.02 THOUSANDS/ΜL (ref 0–0.1)
BASOPHILS NFR BLD AUTO: 0 % (ref 0–1)
BILIRUB SERPL-MCNC: 1.05 MG/DL (ref 0.2–1)
BUN SERPL-MCNC: 22 MG/DL (ref 5–25)
CALCIUM SERPL-MCNC: 9.6 MG/DL (ref 8.3–10.1)
CHLORIDE SERPL-SCNC: 103 MMOL/L (ref 100–108)
CHOLEST SERPL-MCNC: 128 MG/DL (ref 50–200)
CO2 SERPL-SCNC: 30 MMOL/L (ref 21–32)
CREAT SERPL-MCNC: 1.55 MG/DL (ref 0.6–1.3)
CREAT UR-MCNC: 57 MG/DL
EOSINOPHIL # BLD AUTO: 0.33 THOUSAND/ΜL (ref 0–0.61)
EOSINOPHIL NFR BLD AUTO: 4 % (ref 0–6)
ERYTHROCYTE [DISTWIDTH] IN BLOOD BY AUTOMATED COUNT: 12.9 % (ref 11.6–15.1)
EST. AVERAGE GLUCOSE BLD GHB EST-MCNC: 192 MG/DL
GFR SERPL CREATININE-BSD FRML MDRD: 41 ML/MIN/1.73SQ M
GLUCOSE P FAST SERPL-MCNC: 244 MG/DL (ref 65–99)
HBA1C MFR BLD: 8.3 % (ref 4.2–6.3)
HCT VFR BLD AUTO: 46.3 % (ref 36.5–49.3)
HDLC SERPL-MCNC: 39 MG/DL (ref 40–60)
HGB BLD-MCNC: 16.3 G/DL (ref 12–17)
LDLC SERPL CALC-MCNC: 75 MG/DL (ref 0–100)
LYMPHOCYTES # BLD AUTO: 1.28 THOUSANDS/ΜL (ref 0.6–4.47)
LYMPHOCYTES NFR BLD AUTO: 15 % (ref 14–44)
MCH RBC QN AUTO: 32 PG (ref 26.8–34.3)
MCHC RBC AUTO-ENTMCNC: 35.2 G/DL (ref 31.4–37.4)
MCV RBC AUTO: 91 FL (ref 82–98)
MICROALBUMIN UR-MCNC: 68.4 MG/L (ref 0–20)
MICROALBUMIN/CREAT 24H UR: 120 MG/G CREATININE (ref 0–30)
MONOCYTES # BLD AUTO: 0.81 THOUSAND/ΜL (ref 0.17–1.22)
MONOCYTES NFR BLD AUTO: 9 % (ref 4–12)
NEUTROPHILS # BLD AUTO: 6.31 THOUSANDS/ΜL (ref 1.85–7.62)
NEUTS SEG NFR BLD AUTO: 72 % (ref 43–75)
NRBC BLD AUTO-RTO: 0 /100 WBCS
PLATELET # BLD AUTO: 210 THOUSANDS/UL (ref 149–390)
PMV BLD AUTO: 9.8 FL (ref 8.9–12.7)
POTASSIUM SERPL-SCNC: 4.5 MMOL/L (ref 3.5–5.3)
PROT SERPL-MCNC: 7.6 G/DL (ref 6.4–8.2)
RBC # BLD AUTO: 5.09 MILLION/UL (ref 3.88–5.62)
SODIUM SERPL-SCNC: 137 MMOL/L (ref 136–145)
TRIGL SERPL-MCNC: 71 MG/DL
TSH SERPL DL<=0.05 MIU/L-ACNC: 1.19 UIU/ML (ref 0.36–3.74)
WBC # BLD AUTO: 8.84 THOUSAND/UL (ref 4.31–10.16)

## 2017-10-12 PROCEDURE — 36415 COLL VENOUS BLD VENIPUNCTURE: CPT

## 2017-10-12 PROCEDURE — 82570 ASSAY OF URINE CREATININE: CPT

## 2017-10-12 PROCEDURE — 80053 COMPREHEN METABOLIC PANEL: CPT

## 2017-10-12 PROCEDURE — 85025 COMPLETE CBC W/AUTO DIFF WBC: CPT

## 2017-10-12 PROCEDURE — 83036 HEMOGLOBIN GLYCOSYLATED A1C: CPT

## 2017-10-12 PROCEDURE — 82043 UR ALBUMIN QUANTITATIVE: CPT

## 2017-10-12 PROCEDURE — 80061 LIPID PANEL: CPT

## 2017-10-12 PROCEDURE — 84443 ASSAY THYROID STIM HORMONE: CPT

## 2017-10-19 ENCOUNTER — ALLSCRIPTS OFFICE VISIT (OUTPATIENT)
Dept: OTHER | Facility: OTHER | Age: 82
End: 2017-10-19

## 2017-10-21 NOTE — PROGRESS NOTES
Assessment  1  Hypertension (401 9) (I10)   2  Type 2 diabetes mellitus with diabetic nephropathy (250 40,583 81) (E11 21)   3  Type 2 diabetes mellitus with diabetic neuropathy (250 60,357 2) (E11 40)   4  Chronic kidney disease, stage 3 (585 3) (N18 3)   5  Hyperlipidemia (272 4) (E78 5)   6  Arteriosclerosis of coronary artery (414 00) (I25 10)   7  Anxiety disorder (300 00) (F41 9)   8  Insomnia (780 52) (G47 00)   9  Mild cognitive impairment (331 83) (G31 84)    Plan  Anxiety disorder    · From  ALPRAZolam XR 0 5 MG Oral Tablet Extended Release 24 Hour Take 1  tab  Daily To ALPRAZolam XR 0 5 MG Oral Tablet Extended Release 24 Hour Take 1/2   tab  daily  Anxiety disorder, Arteriosclerosis of coronary artery, Chronic kidney disease, stage 3,  Hypertension, Type 2 diabetes mellitus with diabetic nephropathy, Type 2 diabetes  mellitus with diabetic neuropathy    · Follow-up visit in 5 months Evaluation and Treatment  Follow-up  Status: Complete   Done: 78UBQ2359  Arteriosclerosis of coronary artery, Chronic kidney disease, stage 3, Hypertension, Type 2  diabetes mellitus with diabetic nephropathy    · (1) COMPREHENSIVE METABOLIC PANEL; Status:Active; Requested for:06Mar2018;    · (1) LIPID PANEL, FASTING; Status:Active; Requested for:06Mar2018;    · (1) TSH; Status:Active; Requested for:06Mar2018;   Hyperlipidemia    · Simvastatin 40 MG Oral Tablet; TAKE 1 TABLET DAILY  Hypertension    · Irbesartan 300 MG Oral Tablet; TAKE 1 TABLET DAILY AS     DIRECTED  Insomnia    · Melatonin 3 MG Oral Capsule; TAKE 1 CAPSULE AT BEDTIME AS NEEDED  Mild cognitive impairment    · *1 - [facility]  *  Status: Active  Requested for:  76WWV0041  Care Summary provided  : Yes  Type 2 diabetes mellitus with diabetic nephropathy    · Acarbose 50 MG Oral Tablet; Take 1 tab before breakfast and dinner,  take 2  tabs before lunch   · Glimepiride 4 MG Oral Tablet; take 1 tab  BID   · Januvia 50 MG Oral Tablet;  Take 1 tab daily   · (1) HEMOGLOBIN A1C; Status:Active; Requested for:80Fxw1697;    · (1) MICROALBUMIN CREATININE RATIO, RANDOM URINE; Status:Active; Requested  for:06Mar2018; Discussion/Summary    1  HTN - stable  Continue Irbesartan 300 mg daily  CAD / Paroxysmal SVT/ S/P AV node ablation in 4/17 statin, aspirin  Followup with cardiologist Dr Cornelius Perdomo next month  2 DM - uncontrolled  Recommended to schedule appointment with endocrinologist Dr Jose Ding to review medical regimen  Glimepiride, Acarbose, Januvia until seen by endocrinology  a low-carb diet  Continue to monitor blood sugar at home  stage 3 - creatinine is stable  Advised to stay well hydrated, avoid NSAIDs  continue to monitor BUN, creatinine  Consider nephrology evaluation  - continue Simvastatin 40 mg daily  disorder / Insomnia - will decrease dose of Alprazolam XR to 0 5 mg to 1/2 tablet daily  wife will call with update on symptoms in 2 weeks  Goal is to stop taking benzodiazepine  has H/o intolerance to Paxil, Celexa, Buspar  Melatonin 3 mg at bedtime for insomnia   cognitive impairment - recommended to schedule comprehensive geriatric assessment at [facility]  Encouraged patient to stay mentally, socially and physically active  follow-up office visit in 5 months with labs prior to next visit  The patient, patient's family was counseled regarding diagnostic results,-- instructions for management,-- risk factor reductions,-- risks and benefits of treatment options,-- importance of compliance with treatment  total time of encounter was 40 minutes-- and-- 25 minutes was spent counseling  Possible side effects of new medications were reviewed with the patient/guardian today  The treatment plan was reviewed with the patient/guardian   The patient/guardian understands and agrees with the treatment plan      Chief Complaint  Patient here for 4 month follow up for Chronic kidney disease, stage 3, Hypertension, and Type 2 diabetes mellitus with diabetic neuropathy  Patient is here today for follow up of chronic conditions described in HPI  History of Present Illness  Patient is 80-year-old male with HTN, CAD, SVT, Type 2 DM with diabetic nephropathy, diabetic neuropathy, CKD stage 3, Hyperlipidemia, chronic low back pain secondary to DDD, lumbar spondylosis, BPH, OA, Anxiety, mild cognitive impairment  presents for 4 month followup visit accompanied by his wife  all current medications, blood work results from 10/12/17  to creatinine ratio 120, LDL 75, creatinine 1 55 - stable, glucose 244, Hemoglobin A1c 8 3 ( previously 7 7 in 2/17)   / H/o paroxysmal SVT - patient has been followed by cardiologist Dr Mindy Corrigan  underwent successful AV node ablation in 4/17  Was seen by cardiologist Dr Sherlyn Harper in 7/17  heart palpitations, CP    has mild cognitive impairment  Wife reports that her  has been forgetful, has memory problems  did not schedule geriatric assessment as recommended at the last visit  disorder - dose of Alprazolam XR was decreased from 1 mg daily to 0  5 mg daily at the last visit with improvement in alertness    - blood pressure remains stable on Irbesartan 300 mg daily  denies chest pain, shortness of breath, dizziness  2 DM - uncontrolled  Hb A1C 8 3, was seen by endocrinology in 10/16  taking Januvia 50 mg daily, Glimepiride 4 mg one tablet twice daily, Acarbose  hypoglycemic episodes  He is due for eye exam with Dr Mary Pan  podiatrist Dr Enrique Lund  - currently on Simvastatin 40 mg daily  Denies side effects from statin therapy  done in 12/14  Colorectal surgeon Dr Maxine Lubin recommended to repeat colonoscopy in 5 years  had Pneumovax done at the pharmacy 3 years ago  shot done last month at the pharmacy  is independent with all ADLs  No history of falls  does not drive a car  Review of Systems    Constitutional: mild fatigue, but-- no fever-- and-- no chills   Weight has been stable  Eyes: wears glasses, but-- no eye pain,-- no dryness of the eyes,-- eyes not red,-- no purulent discharge from the eyes-- and-- no itching of the eyes  No visual disturbances  ENT: hearing loss, but-- no earache,-- no nosebleeds,-- no sore throat,-- no nasal discharge-- and-- no hoarseness  Cardiovascular: no chest pain,-- no intermittent leg claudication,-- no palpitations-- and-- no extremity edema  Respiratory: no shortness of breath,-- no cough,-- no wheezing-- and-- no shortness of breath during exertion  Gastrointestinal: no abdominal pain,-- no nausea,-- no vomiting,-- no constipation,-- no diarrhea-- and-- no blood in stools  Genitourinary: nocturia, but-- no dysuria-- and-- no incontinence  Musculoskeletal: no joint swelling-- and-- no myalgias--    The patient presents with complaints of lower back arthralgias  Integumentary: no rashes,-- no itching-- and-- no skin wound  Neurological: numbness in legs, feet, forgetfulness, short -memory loss, but-- no headache,-- no tingling,-- no dizziness,-- no fainting-- and-- no difficulty walking  Psychiatric: anxiety, but-- no sleep disturbances-- and-- no depression  Endocrine: no muscle weakness  Hematologic/Lymphatic: No complaints of swollen glands, no swollen glands in the neck, does not bleed easily, no easy bruising  Active Problems  1  Aftercare following left hip joint replacement surgery (V54 81,V43 64) (Z47 1,Z96 642)   2  Allergic rhinitis (477 9) (J30 9)   3  Anxiety disorder (300 00) (F41 9)   4  Arteriosclerosis of coronary artery (414 00) (I25 10)   5  Arthritis of multiple sites (716 99) (M13 0)   6  Cervical spinal stenosis (723 0) (M48 02)   7  Chronic kidney disease, stage 3 (585 3) (N18 3)   8  Diabetes type 2, uncontrolled (250 02) (E11 65)   9  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)   10  Hyperlipidemia (272 4) (E78 5)   11  Hypertension (401 9) (I10)   12  Insomnia (780 52) (G47 00)   13   Irritable bowel syndrome (564  1) (K58 9)   14  Lower back pain (724 2) (M54 5)   15  Lumbar spondylosis (721 3) (M47 816)   16  Microscopic hematuria (599 72) (R31 29)   17  Nodular 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 (600 10) (N40 2)   18  Osteoarthritis of left hip (715 95) (M16 12)   19  Pain of left hip joint (719 45) (M25 552)   20  Paroxysmal SVT (supraventricular tachycardia) (427 0) (I47 1)   21  Primary osteoarthritis of left hip (715 15) (M16 12)   22  Primary osteoarthritis of left knee (715 16) (M17 12)   23  Screen for colon cancer (V76 51) (Z12 11)   24  Short-term memory loss (780 93) (R41 3)   25  Spondylolisthesis of lumbar region (738 4) (M43 16)   26  Status post total replacement of left hip (V43 64) (Z96 642)   27  Type 2 diabetes mellitus with diabetic nephropathy (250 40,583 81) (E11 21)   28  Type 2 diabetes mellitus with diabetic neuropathy (250 60,357 2) (E11 40)   29  Vitamin D deficiency (268 9) (E55 9)    Past Medical History  1  History of Acute Cystitis (595 0)   2  History of Anal fissure (565 0) (K60 2)   3  History of Arthralgia of left hip (719 45) (M25 552)   4  History of Benign Polyps Of The Large Intestine (V12 72)   5  History of Degenerative joint disease of ankle and foot, unspecified laterality (715 97)   (M19 079)   6  History of Disc degeneration, lumbar (722 52) (M51 36)   7  History of Diverticulosis (562 10) (K57 90)   8  History of Erectile dysfunction of non-organic origin (302 72) (F52 21)   9  History of bronchitis (V12 69) (Z87 09)   10  History of gastritis (V12 79) (Z87 19)   11  History of hematuria (V13 09) (Z87 448)   12  History of onychomycosis (V12 09) (Z86 19)   13  History of Impacted cerumen of both ears (380 4) (H61 23)   14  History of Irritable bowel syndrome (564 1) (K58 9)   15  History of Lumbar radiculopathy (724 4) (M54 16)   16  History of Neoplasm of skin (239 2) (D49 2)   17  History of Osteoarthritis of hip (715 95) (M16 9)   18   History of Supraventricular tachycardia (427 89) (I47 1)    The active problems and past medical history were reviewed and updated today  Surgical History  1  History of CABG   2  History of Cataract Surgery   3  History of Complete Colonoscopy   4  History of Diagnostic Esophagogastroduodenoscopy   5  History of Hip Replacement Left   6  History of Tonsillectomy   7  History of Umbilical Hernia Repair   8  History of Wrist Surgery    Family History  Mother    1  Family history of Dementia  Father    2  Family history of Acute Myocardial Infarction (V17 3)  Unknown    3  Family history of High blood pressure    The family history was reviewed and updated today  Social History   · Former smoker (X31 45) (A88 881)   · Never Drank Alcohol  The social history was reviewed and updated today  Current Meds   1  Acarbose 50 MG Oral Tablet; Take 1 tab before breakfast and dinner,  take 2 tabs before   lunch; Therapy: 95Rvg2369 to (Evaluate:11Mar2018)  Requested for: 07YBW0492; Last   Rx:16Mar2017 Ordered   2  ALPRAZolam XR 0 5 MG Oral Tablet Extended Release 24 Hour; Take 1 tab  Daily; Therapy: 73DPR9680 to (Last Rx:09Oct2017) Ordered   3  Aspirin EC 81 MG Oral Tablet Delayed Release; Therapy: (0699 982 13 20) to Recorded   4  Glimepiride 4 MG Oral Tablet; take 1 tab  BID; Therapy: 22Cab0557 to (Evaluate:09Jun2018)  Requested for: 32CUS0270; Last   Rx:14Jun2017 Ordered   5  Irbesartan 300 MG Oral Tablet; TAKE 1 TABLET DAILY AS     DIRECTED; Therapy: 72Qro9002 to (Evaluate:09Jun2018)  Requested for: 79RQD8905; Last   Rx:14Jun2017 Ordered   6  Januvia 50 MG Oral Tablet; Take 1  tab daily; Therapy: 04Non5375 to (Last Rx:14Jun2017)  Requested for: 13HGB7312 Ordered   7  Melatonin 3 MG Oral Capsule; TAKE 1 CAPSULE AT BEDTIME AS NEEDED; Therapy: 84URM1799 to (Evaluate:52Ffm7111); Last Rx:14Jun2017 Ordered   8  Omega 3 CAPS; Take 1 caps BID; Therapy: (Recorded:14Oct2015) to Recorded   9   Simvastatin 40 MG Oral Tablet; TAKE 1 TABLET DAILY; Therapy: 68FHG9536 to (Evaluate:09Jun2018)  Requested for: 81KTD6945; Last   Rx:14Jun2017 Ordered   10  Vitamin D3 2000 UNIT Oral Tablet; take 2 tab daily; Last Rx:09Nit0220 Ordered    The medication list was reviewed and updated today  Allergies  1  Actos TABS   2  BuSpar TABS   3  CeleXA TABS   4  LamISIL TABS   5  Paxil TABS   6  RA Tussin SYRP   7  Zoloft TABS    Vitals  Vital Signs    Recorded: 66OWT9984 01:49PM Recorded: 26GUG7575 01:21PM   Temperature  97 7 F, Temporal   Heart Rate  60, L Radial   Respiration  16   Systolic 097 052, LUE   Diastolic 70 66, LUE   Height  5 ft 6 in   Weight  181 lb 12 8 oz   BMI Calculated  29 34   BSA Calculated  1 92   Pain Scale  5     Physical Exam    Constitutional   General appearance: No acute distress, well appearing and well nourished  -- Overweight  Eyes   Conjunctiva and lids: No swelling, erythema, or discharge  Pupils and irises: Equal, round and reactive to light  Ears, Nose, Mouth, and Throat   External inspection of ears and nose: Normal     Otoscopic examination: Tympanic membrance translucent with normal light reflex  Canals patent without erythema  Oropharynx: Normal with no erythema, edema, exudate or lesions  Pulmonary   Respiratory effort: No increased work of breathing or signs of respiratory distress  Auscultation of lungs: Clear to auscultation, equal breath sounds bilaterally, no wheezes, no rales, no rhonci  Cardiovascular   Auscultation of heart: Normal rate and rhythm, normal S1 and S2, without murmurs  Examination of extremities for edema and/or varicosities: Normal     Carotid pulses: Normal  -- no carotid bruits  Abdomen   Abdomen: Non-tender, no masses  Liver and spleen: No hepatomegaly or splenomegaly  Musculoskeletal   Gait and station: Abnormal  -- Patient ambulates with a cane  Digits and nails: Normal without clubbing or cyanosis      Inspection/palpation of joints, bones, and muscles: Normal     Skin   Skin and subcutaneous tissue: Normal without rashes or lesions  Psychiatric   Mood and affect: Abnormal   Slightly anxious  Results/Data  (1) COMPREHENSIVE METABOLIC PANEL 20WWS9976 12:71KU Gene Rico    Order Number: [id number]     Test Name Result Flag Reference   SODIUM 137 mmol/L  136-145   POTASSIUM 4 5 mmol/L  3 5-5 3   CHLORIDE 103 mmol/L  100-108   CARBON DIOXIDE 30 mmol/L  21-32   ANION GAP (CALC) 4 mmol/L  4-13   BLOOD UREA NITROGEN 22 mg/dL  5-25   CREATININE 1 55 mg/dL H 0 60-1 30   Standardized to IDMS reference method   CALCIUM 9 6 mg/dL  8 3-10 1   BILI, TOTAL 1 05 mg/dL H 0 20-1 00   ALK PHOSPHATAS 95 U/L     ALT (SGPT) 30 U/L  12-78   Specimen collection should occur prior to Sulfasalazine and/or Sulfapyridine administration due to the potential for falsely depressed results  AST(SGOT) 17 U/L  5-45   Specimen collection should occur prior to Sulfasalazine administration due to the potential for falsely depressed results  ALBUMIN 3 6 g/dL  3 5-5 0   TOTAL PROTEIN 7 6 g/dL  6 4-8 2   eGFR 41 ml/min/1 73sq m     [facility] Disease Education Program recommendations are as follows:  GFR calculation is accurate only with a steady state creatinine  Chronic Kidney disease less than 60 ml/min/1 73 sq  meters  Kidney failure less than 15 ml/min/1 73 sq  meters  GLUCOSE FASTING 244 mg/dL H 65-99   Specimen collection should occur prior to Sulfasalazine administration due to the potential for falsely depressed results  Specimen collection should occur prior to Sulfapyridine administration due to the potential for falsely elevated results       (1) CBC/PLT/DIFF 50OHP0534 09:05AM Gene Rico    Order Number: [id number]     Test Name Result Flag Reference   WBC COUNT 8 84 Thousand/uL  4 31-10 16   RBC COUNT 5 09 Million/uL  3 88-5 62   HEMOGLOBIN 16 3 g/dL  12 0-17 0   HEMATOCRIT 46 3 %  36 5-49 3   MCV 91 fL 82-98   MCH 32 0 pg  26 8-34 3   MCHC 35 2 g/dL  31 4-37 4   RDW 12 9 %  11 6-15 1   MPV 9 8 fL  8 9-12 7   PLATELET COUNT 701 Thousands/uL  149-390   nRBC AUTOMATED 0 /100 WBCs     NEUTROPHILS RELATIVE PERCENT 72 %  43-75   LYMPHOCYTES RELATIVE PERCENT 15 %  14-44   MONOCYTES RELATIVE PERCENT 9 %  4-12   EOSINOPHILS RELATIVE PERCENT 4 %  0-6   BASOPHILS RELATIVE PERCENT 0 %  0-1   NEUTROPHILS ABSOLUTE COUNT 6 31 Thousands/? ??L  1 85-7 62   LYMPHOCYTES ABSOLUTE COUNT 1 28 Thousands/? ??L  0 60-4 47   MONOCYTES ABSOLUTE COUNT 0 81 Thousand/? ??L  0 17-1 22   EOSINOPHILS ABSOLUTE COUNT 0 33 Thousand/? ??L  0 00-0 61   BASOPHILS ABSOLUTE COUNT 0 02 Thousands/? ??L  0 00-0 10     (1) TSH 12Oct2017 09:05AM Jhoan Salvador    Order Number: [id number]     Test Name Result Flag Reference   TSH 1 190 uIU/mL  0 358-3 740   Patients undergoing fluorescein dye angiography may retain small amounts of fluorescein in the body for 48-72 hours post procedure  Samples containing fluorescein can produce falsely depressed TSH values  If the patient had this procedure,a specimen should be resubmitted post fluorescein clearance  (1) LIPID PANEL, FASTING 68XJN1037 09:05AM Jhoan Salvador    Order Number: [id number]     Test Name Result Flag Reference   CHOLESTEROL 128 mg/dL     HDL,DIRECT 39 mg/dL L 40-60   Specimen collection should occur prior to Metamizole administration due to the potential for falsley depressed results  LDL CHOLESTEROL CALCULATED 75 mg/dL  0-100   Triglyceride:        Normal <150 mg/dl   Borderline High 150-199 mg/dl   High 200-499 mg/dl   Very High >499 mg/dl      Cholesterol:       Desirable <200 mg/dl    Borderline High 200-239 mg/dl    High >239 mg/dl      HDL Cholesterol:       High>59 mg/dL    Low <41 mg/dL      This screening LDL is a calculated result     It does not have the accuracy of the Direct Measured LDL in the monitoring of patients with hyperlipidemia and/or statin therapy  Direct Measure LDL (BEF175) must be ordered separately in these patients  TRIGLYCERIDES 71 mg/dL  <=150   Specimen collection should occur prior to N-Acetylcysteine or Metamizole administration due to the potential for falsely depressed results  (1) MICROALBUMIN CREATININE RATIO, RANDOM URINE 41RQW3564 09:05AM Nubia Hawley    Order Number: [id number]     Test Name Result Flag Reference   MICROALBUMIN/ CREAT R 120 mg/g creatinine H 0-30   MICROALBUMIN,URINE 68 4 mg/L H 0 0-20 0   CREATININE URINE 57 0 mg/dL       (1) HEMOGLOBIN A1C 12Oct2017 09:05AM Nubia Hawely     Test Name Result Flag Reference   HEMOGLOBIN A1C 8 3 % H 4 2-6 3   EST  AVG  GLUCOSE 192 mg/dl       Health Management  Screen for colon cancer   COLONOSCOPY; every 5 years; Last 67Jxk0888; Next Due: 69Udm9111; Active  Health Maintenance   Medicare Annual Wellness Visit; every 1 year; Next Due: 73Nlg0931; Overdue    Future Appointments    Date/Time Provider Specialty Site   03/22/2018 02:15 PM FREDY Buck   Family Medicine [address] [place] [address]     Signatures   Electronically signed by : FREDY Chaves ; Oct 19 2017 10:59PM EST                       (Author)

## 2017-10-25 ENCOUNTER — GENERIC CONVERSION - ENCOUNTER (OUTPATIENT)
Dept: OTHER | Facility: OTHER | Age: 82
End: 2017-10-25

## 2017-11-08 ENCOUNTER — GENERIC CONVERSION - ENCOUNTER (OUTPATIENT)
Dept: OTHER | Facility: OTHER | Age: 82
End: 2017-11-08

## 2017-12-04 DIAGNOSIS — I10 ESSENTIAL (PRIMARY) HYPERTENSION: ICD-10-CM

## 2017-12-04 DIAGNOSIS — E11.40 TYPE 2 DIABETES MELLITUS WITH DIABETIC NEUROPATHY (HCC): ICD-10-CM

## 2017-12-04 DIAGNOSIS — N18.30 CHRONIC KIDNEY DISEASE, STAGE III (MODERATE) (HCC): ICD-10-CM

## 2018-01-12 VITALS
SYSTOLIC BLOOD PRESSURE: 137 MMHG | DIASTOLIC BLOOD PRESSURE: 70 MMHG | HEART RATE: 72 BPM | WEIGHT: 185 LBS | HEIGHT: 66 IN | BODY MASS INDEX: 29.73 KG/M2

## 2018-01-12 NOTE — RESULT NOTES
Message   Call patient  Fasting  blood sugar 169  Kidney function test has improved  Creatinine 1 34 (was 1 85)  Recommend  to continue current medications for DM, HTN,  stay well hydrated  Schedule endocrinology evaluation as recomended previously  Verified Results  (1) BASIC METABOLIC PROFILE 70XIB9859 06:58AM Sandra Hankins   TW Order Number: [id number]      National Kidney Disease Education Program recommendations are as follows:  GFR calculation is accurate only with a steady state creatinine  Chronic Kidney disease less than 60 ml/min/1 73 sq  meters  Kidney failure less than 15 ml/min/1 73 sq  meters       Test Name Result Flag Reference   GLUCOSE,RANDM 169 mg/dL H    SODIUM 141 mmol/L  136-145   POTASSIUM 4 6 mmol/L  3 5-5 3   CHLORIDE 105 mmol/L  100-108   CARBON DIOXIDE 29 mmol/L  21-32   ANION GAP (CALC) 7 mmol/L  4-13   BLOOD UREA NITROGEN 25 mg/dL  5-25   CREATININE 1 34 mg/dL H 0 60-1 30   CALCIUM 9 2 mg/dL  8 3-10 1   eGFR Non-African American 51 3 ml/min/1 73sq m

## 2018-01-12 NOTE — PROGRESS NOTES
History of Present Illness  Care Coordination Encounter Information:   Type of Encounter: Telephonic   Contact: Initial Contact   Last Office Visit: 2/22/2015   Spoke to Patient and Spouse   Spoke with patient and wife about his post op recovery from left hip surgery on 2/3/16  Patient has no pain  He started physical therapy yesterday outpatient  He has had home physical therapy and continues with those exercises  The biggest concern that the wife has is getting his blood sugars back on tract  She said they are working very hard on his diet and getting the blood sugars back down to his normal range  His fasting today 3/1/16 was 154  She is taking the levels three times a day  She is logging all levels and what he is eating  No episodes of hypoglycemia  Left leg was swollen a few days ago therapist instructed patient to elevate leg and swelling was down in 24 hours  No other concerns  He has follow in June  Active Problems    1  Aftercare following left hip joint replacement surgery (V54 81,V43 64) (Z47 1,Z96 642)   2  Allergic rhinitis (477 9) (J30 9)   3  Anxiety disorder (300 00) (F41 9)   4  Arthralgia of left hip (719 45) (M25 552)   5  Arthritis of multiple sites (716 99) (M19 90)   6  CAD (coronary atherosclerotic disease) (414 00) (I25 10)   7  Cervical spinal stenosis (723 0) (M48 02)   8  Chronic kidney disease, stage 3 (585 3) (N18 3)   9  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)   10  Hyperlipidemia (272 4) (E78 5)   11  Hypertension (401 9) (I10)   12  Irritable bowel syndrome (564 1) (K58 9)   13  Lower back pain (724 2) (M54 5)   14  Lumbar spondylosis (721 3) (M47 816)   15  Microscopic hematuria (599 72) (R31 2)   16  Nodular 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 (600 10) (N40 2)   17  Osteoarthritis of left hip (715 95) (M16 12)   18  Primary osteoarthritis of left hip (715 15) (M16 12)   19  Screen for colon cancer (V76 51) (Z12 11)   20  Spondylolisthesis of lumbar region (738 4) (M43 16)   21  Status post total replacement of left hip (V43 64) (Z96 642)   22  Type 2 diabetes mellitus with diabetic nephropathy (250 40,583 81) (E11 21)   23  Type 2 diabetes mellitus with diabetic neuropathy (250 60,357 2) (E11 40)    Past Medical History    1  History of Acute Cystitis (595 0)   2  History of Anal fissure (565 0) (K60 2)   3  History of Benign Polyps Of The Large Intestine (V12 72)   4  History of Degenerative joint disease of ankle and foot, unspecified laterality (715 97)   (M19 079)   5  History of Disc degeneration, lumbar (722 52) (M51 36)   6  History of Diverticulosis (562 10) (K57 90)   7  History of Erectile dysfunction of non-organic origin (302 72) (F52 21)   8  History of bronchitis (V12 69) (Z87 09)   9  History of gastritis (V12 79) (Z87 19)   10  History of hematuria (V13 09) (Z87 448)   11  History of onychomycosis (V12 09) (Z86 19)   12  History of Irritable bowel syndrome (564 1) (K58 9)   13  History of Lumbar radiculopathy (724 4) (M54 16)   14  History of Neoplasm of skin (239 2) (D49 2)   15  History of Osteoarthritis of hip (715 95) (M16 9)   16  History of Supraventricular tachycardia (427 89) (I47 1)    Surgical History    1  History of CABG   2  History of Cataract Surgery   3  History of Complete Colonoscopy   4  History of Diagnostic Esophagogastroduodenoscopy   5  History of Tonsillectomy   6  History of Umbilical Hernia Repair   7  History of Wrist Surgery    Family History    1  Family history of Dementia    2  Family history of Acute Myocardial Infarction (V17 3)    3  Family history of High blood pressure    Social History    · Former smoker (B66 74) (C79 209)   · Never Drank Alcohol    Current Meds    1  ALPRAZolam XR 1 MG Oral Tablet Extended Release 24 Hour; TAKE 1 TABLET DAILY; Therapy: 18BSH9850 to (Evaluate:22Eoa1538); Last Rx:60Uys5804 Ordered    2  Folic Acid 1 MG Oral Tablet; TAKE 1 TABLET DAILY;    Therapy: 77UTG0553 to (Cherelle Doty)  Requested for: 26JXS3560; Last   CAROLE:48XVL7380 Ordered    3  Glucosamine Chondr 1500 Complx Oral Capsule; Therapy: 86PZZ7349 to Recorded    4  Simvastatin 40 MG Oral Tablet; TAKE 1 TABLET DAILY; Therapy: 92XBX4930 to (Evaluate:14Jun2016)  Requested for: 93RGM2610; Last   Rx:56Hqq7019 Ordered    5  Irbesartan 300 MG Oral Tablet; TAKE 1 TABLET DAILY AS     DIRECTED; Therapy: 10Itb7287 to ([facility])  Requested for: 35EUV3201; Last   Rx:85Efd9331 Ordered    6  Ferrous Sulfate 325 (65 Fe) MG Oral Tablet; TAKE 1 TABLET 2 TIMES DAILY WITH FOOD; Therapy: 81PPF0674 to (Evaluate:13Mar2016)  Requested for: 74Rbd5563 Recorded   7  Vitamin C 500 MG Oral Capsule; TAKE 1 CAPSULE 3 times daily; Therapy: 70LIF2744 to (Evaluate:14Feb2016)  Requested for: 00XLE6810; Last   Rx:85Gza7584 Ordered    8  Glimepiride 4 MG Oral Tablet; take 1 tab  BID; Therapy: 54Abs7146 to (Evaluate:01Hcc9112)  Requested for: 51Xuy7560; Last   Rx:02Bvd8818 Ordered    9  Januvia 50 MG Oral Tablet; Take 1 tablet daily; Therapy: 23Ilc6487 to (Evaluate:14Jun2016)  Requested for: 13NJX4573; Last   Rx:24Udb2894 Ordered    10  Acarbose 50 MG Oral Tablet; Take 1 tab before breakfast and dinner,  take 2 tabs before    lunch; Therapy: 48Vop2381 to (Evaluate:69Hii4787)  Requested for: 23Qbs0190; Last    Rx:72Trh6463 Ordered   11  Gabapentin 100 MG Oral Capsule; take 1 caps  3 times daily; Therapy: 54DPA7062 to (Last Rx:89Esb7867)  Requested for: 42BHR3536 Ordered    12  Aspirin EC 81 MG Oral Tablet Delayed Release; Therapy: (97 524152) to Recorded   13  Cinnamon 500 MG Oral Capsule; Therapy: (Recorded:06Ooh1850) to Recorded   14  Enoxaparin Sodium 40 MG/0 4ML Subcutaneous Solution Recorded   15  Omega 3 CAPS; Take 1 caps BID; Therapy: (Recorded:14Oct2015) to Recorded   16  Vitamin D 1000 UNIT Oral Tablet; take 2 tablet daily; Therapy: (Recorded:14Oct2015) to Recorded    Allergies    1  Actos TABS   2  BuSpar TABS   3   CeleXA TABS 4  LamISIL TABS   5  Paxil TABS   6  RA Tussin SYRP   7  Zoloft TABS    Health Management   COLONOSCOPY; every 5 years; Last 09Grr2302; Next Due: 14Yom1850; Active   Medicare Annual Wellness Visit; every 1 year; Next Due: 35Fxc3421; Overdue    End of Encounter Meds    1  ALPRAZolam XR 1 MG Oral Tablet Extended Release 24 Hour; TAKE 1 TABLET DAILY; Therapy: 72HUW8622 to (Evaluate:14Jun2016); Last Rx:65Lbr6230 Ordered    2  Folic Acid 1 MG Oral Tablet; TAKE 1 TABLET DAILY; Therapy: 04CIW9952 to (Percy Unger)  Requested for: 23JJF9158; Last   Rx:04Jan2016 Ordered    3  Glucosamine Chondr 1500 Complx Oral Capsule; Therapy: 63FKZ1664 to Recorded    4  Simvastatin 40 MG Oral Tablet; TAKE 1 TABLET DAILY; Therapy: 81WBO9201 to (Evaluate:14Jun2016)  Requested for: 37BHS2087; Last   Rx:56Zhr0450 Ordered    5  Irbesartan 300 MG Oral Tablet; TAKE 1 TABLET DAILY AS     DIRECTED; Therapy: 44Alg5203 to (Eva Mccarthy)  Requested for: 00ZNY4171; Last   Rx:41Rnm9710 Ordered    6  Ferrous Sulfate 325 (65 Fe) MG Oral Tablet; TAKE 1 TABLET 2 TIMES DAILY WITH FOOD; Therapy: 30RDN3920 to (Evaluate:13Mar2016)  Requested for: 57Srx8941 Recorded   7  Vitamin C 500 MG Oral Capsule; TAKE 1 CAPSULE 3 times daily; Therapy: 26NSF7981 to (Evaluate:89Gih8724)  Requested for: 90EMH3144; Last   Rx:97Xiy4889 Ordered    8  Glimepiride 4 MG Oral Tablet; take 1 tab  BID; Therapy: 70Ayp4421 to (Evaluate:88Eoj7752)  Requested for: 22Mqz2647; Last   Rx:45Pzl8823 Ordered    9  Januvia 50 MG Oral Tablet; Take 1 tablet daily; Therapy: 32Brh8956 to (Evaluate:14Jun2016)  Requested for: 79ZBP5627; Last   Rx:24Sxj9357 Ordered    10  Acarbose 50 MG Oral Tablet; Take 1 tab before breakfast and dinner,  take 2 tabs before    lunch; Therapy: 13Tif0211 to (Evaluate:51Qhp1307)  Requested for: 84Fyl3281; Last    Rx:54Pac7223 Ordered   11  Gabapentin 100 MG Oral Capsule; take 1 caps  3 times daily;     Therapy: 28FQC5756 to (Last Rx:45Ouj1873)  Requested for: 54XNP7309 Ordered    12  Aspirin EC 81 MG Oral Tablet Delayed Release; Therapy: (0660 303 88 06) to Recorded   13  Cinnamon 500 MG Oral Capsule; Therapy: (Recorded:60Fnq5785) to Recorded   14  Enoxaparin Sodium 40 MG/0 4ML Subcutaneous Solution Recorded   15  Omega 3 CAPS; Take 1 caps BID; Therapy: (Recorded:60Yzx4670) to Recorded   16  Vitamin D 1000 UNIT Oral Tablet; take 2 tablet daily; Therapy: (Recorded:29Rrg0404) to Recorded    Future Appointments    Date/Time Provider Specialty Site   06/21/2016 01:30 PM FREDY Menchaca  [facility] [address] [place] [address]   03/16/2016 01:15 PM FREDY Blas  [facility]     Patient Care Team    Care Team Member Role Specialty Office Number   Daniela SHERIDAN  Specialist Urology [phone]   Hattie Diallo MD Specialist Orthopedic Surgery [phone]   Bronson SHERIDAN   Specialist Family Medicine [phone]     Signatures   Electronically signed by : Ketty Giraldo RN; Mar  1 2016 11:05AM EST                       (Author)

## 2018-01-12 NOTE — PROGRESS NOTES
Discussion/Summary    PATIENT EDUCATION RECORD   Healthy Eating:   Discussed importance of meal timing/consistency: Method: Instruction  Response: Verbalizes Understanding and Needs Review   Discussed nutrient types ( Cho/Fat/Protein): Method: Instruction  Response: Verbalizes Understanding and Needs Review   Discussed portion sizes: Method: Instruction  Response: Verbalizes Understanding and Needs Review   Discussed alcohol consumption: Method: Instruction  Response: Verbalizes Understanding and Needs ReviewResponse: Returns Demonstration    3 day food diary returned  initial Response: His current weight is 188  His keal needs are   His CHO's per meal are 45  Discussed basic carbohydrate counting: Method: Instruction  Response: Verbalizes Understanding and Needs Review      Chief Complaint  Opal Sanford and his wife were seen for MNT f/u this morning for type 2 diabetes  History of Present Illness  Magan's wife has kept continuous food records since soon after last visit  These records plus BG records have helped them to realize that an HS snack may be benefitting Magan's BG control in the morning  Explained how this might be happening and encouraged him to continue to have a snack at bedtime regularly  Explained that his medications are still working and what he can do to help improve his BG control  Emphasized that use of insulin in the future would not be a failure but a progression  Current A1c of 7 5% in an almost 80year-old gentleman is not unacceptable  Encouraged continued adherence to meal plan and keeping food records  This is his follow-up assessment   Present at session: patient  and spouse    Data:   Weight: 188#    HbA1c 7 5%    See scanned food record            Medical Nutrition Therapy Intervention: Carbohydrate counting, Strategies to monitor portion control, Label reading and Benefit of snacks   His comprehension was very good    His motivation was very good       His compliance was very good    Goals:  1  Include an evening or bedtime snack daily (15-20 g carb), it may help with morning blood glucose levels  2  If you are going out for dinner in the evening and having a smaller midday meal, take 1 acarbose at midday and 2 before evening meal   3  Be sure morning snack is only 15-20 g carbohydrate  Future Appointments    Date/Time Provider Specialty Site   10/28/2016 11:00 AM FREDY Nolasco   02/15/2017 01:00 PM FREDY Nolasco   02/08/2017 01:00 PM FREDY Canela  Orthopedic Surgery [facility] [place]   02/23/2017 11:30 AM Fred Bailey RD Diabetes Educator [facility] ENDOCRINOLOGY   02/23/2017 10:40 AM Nida Goodpasture, M D   Endocrinology [address] ENDOCRINOLOGY     Signatures   Electronically signed by : Caitlyn Marshall RD; Oct 27 2016  2:08PM EST                       (Author)    Electronically signed by : FREDY Suero ; Oct 28 2016  9:34AM EST

## 2018-01-13 VITALS — TEMPERATURE: 97.4 F | WEIGHT: 182 LBS | HEIGHT: 66 IN | BODY MASS INDEX: 29.25 KG/M2

## 2018-01-13 VITALS
DIASTOLIC BLOOD PRESSURE: 64 MMHG | TEMPERATURE: 97.3 F | HEIGHT: 66 IN | RESPIRATION RATE: 16 BRPM | WEIGHT: 186 LBS | BODY MASS INDEX: 29.89 KG/M2 | HEART RATE: 64 BPM | SYSTOLIC BLOOD PRESSURE: 124 MMHG

## 2018-01-13 NOTE — RESULT NOTES
Message   Call patient  Blood sugar is elevated at 225  Kidney function test ( creatinine level )  is elevated  Recommend patient to increase fluid intake  I recommend endocrinology evaluation to review medications for diabetes to prevent further kidney demage  Please mail referal for endocrinology evaluation to patient/ Ask if patient wants our office to set up appointment for him  Recheck kidney function test in 2 weeks  Mail script for blood work to patient       Verified Results  (1) CBC/PLT/DIFF 25PUH3947 10:54AM Mp Gallego    Order Number: [id number]    TW Order Number: [id number]     Test Name Result Flag Reference   WBC COUNT 11 32 Thousand/uL H 4 31-10 16   RBC COUNT 4 44 Million/uL  3 88-5 62   HEMOGLOBIN 13 9 g/dL  12 0-17 0   HEMATOCRIT 41 3 %  36 5-49 3   MCV 93 fL  82-98   MCH 31 3 pg  26 8-34 3   MCHC 33 7 g/dL  31 4-37 4   RDW 13 1 %  11 6-15 1   MPV 10 2 fL  8 9-12 7   PLATELET COUNT 587 Thousands/uL  149-390   nRBC AUTOMATED 0 /100 WBCs     NEUTROPHILS RELATIVE PERCENT 80 % H 43-75   LYMPHOCYTES RELATIVE PERCENT 11 % L 14-44   MONOCYTES RELATIVE PERCENT 8 %  4-12   EOSINOPHILS RELATIVE PERCENT 1 %  0-6   BASOPHILS RELATIVE PERCENT 0 %  0-1   NEUTROPHILS ABSOLUTE COUNT 8 99 Thousands/µL H 1 85-7 62   LYMPHOCYTES ABSOLUTE COUNT 1 24 Thousands/µL  0 60-4 47   MONOCYTES ABSOLUTE COUNT 0 87 Thousand/µL  0 17-1 22   EOSINOPHILS ABSOLUTE COUNT 0 12 Thousand/µL  0 00-0 61   BASOPHILS ABSOLUTE COUNT 0 02 Thousands/µL  0 00-0 10     (1) IRON 00WUJ1842 10:54AM Andrade Flavin Order Number: [id number]     Test Name Result Flag Reference   IRON 110 ug/dL       (1) COMPREHENSIVE METABOLIC PANEL 22QKX9548 90:75OW Andrade Flavin Order Number: [id number]      National Kidney Disease Education Program recommendations are as follows:  GFR calculation is accurate only with a steady state creatinine  Chronic Kidney disease less than 60 ml/min/1 73 sq  meters  Kidney failure less than 15 ml/min/1 73 sq  meters  Test Name Result Flag Reference   GLUCOSE,RANDM 225 mg/dL H    SODIUM 139 mmol/L  136-145   POTASSIUM 4 7 mmol/L  3 5-5 3   CHLORIDE 104 mmol/L  100-108   CARBON DIOXIDE 24 mmol/L  21-32   ANION GAP (CALC) 11 mmol/L  4-13   BLOOD UREA NITROGEN 30 mg/dL H 5-25   CREATININE 1 85 mg/dL H 0 60-1 30   CALCIUM 8 8 mg/dL  8 3-10 1   BILI, TOTAL 0 58 mg/dL  0 20-1 00   ALK PHOSPHATAS 106 U/L     ALT (SGPT) 26 U/L  12-78   AST(SGOT) 12 U/L  5-45   ALBUMIN 3 6 g/dL  3 5-5 0   TOTAL PROTEIN 7 0 g/dL  6 4-8 2   eGFR Non-African American 35 4 ml/min/1 73sq m         Plan  Chronic kidney disease, stage 3, Type 2 diabetes mellitus with diabetic nephropathy    · *1 - [facility]CRINOLOG[facility] Physician Referral  Consult  Status: Active  Requested for:  85IBK8245  Care Summary provided  : Yes   · (1) BASIC METABOLIC PROFILE; Status:Active;  Requested for:96Jxw6231;

## 2018-01-13 NOTE — PROGRESS NOTES
Plan    1  DSMT/MNT Time Record; Status:Complete;   Done: 87UWG0670 09:45AM    Discussion/Summary    PATIENT EDUCATION RECORD   Indication for Services: type 2 Diabetes Mellitus, Kidney problems, hyperlipidemia and obesity  He is ready to learn  He has no barriers to learning  Healthy Eating:   Discussed importance of meal timing/consistency: Method: Instruction and Handout  Response: Verbalizes Understanding and Needs Review   Discussed nutrient types ( Cho/Fat/Protein): Method: Instruction and Handout  Response: Verbalizes Understanding   Discussed portion sizes: Method: Instruction and Handout  Discussed sodium intake: Method: Instruction  Response: Verbalizes Understanding and Needs Review   Discussed food label reading: Method: Instruction  Response: Verbalizes Understanding and Needs Review  Provided food diary and instructions on use: Method: InstructionResponse: Verbalizes Understanding   Provided meal planning: Method: Instruction and Handout  Response: Verbalizes Understanding and Needs Review His current weight is 186  His keal needs are 1700  His CHO's per meal are 45  He/She was provided a meal plan for: fixed carbohydrates  Response:   His current BMI 30  Discussed basic carbohydrate counting: Method: Instruction and Handout  Response: Verbalizes Understanding and Needs Review He was given the following educational materials: portion book and Personal Meal Plan 1700 calories   Chief Complaint  Patient was seen for uncontrolled type 2 diabetes  History of Present Illness  Magan's diet history reveals controlled carbohydrate intake, varied vegetable and fruit intake, and rare indiscretions with candy or ice cream  He and his wife both have diabetes, however their attitudes towards food differ so there are items in the pantry and refrigerator that tempt Cynthia Lerner but not his wife and he may overeat at times  Melquiades Mancera, his wife, understands carbohydrate counting   Cynthia Lerner seems to have some difficulty focusing on education provided but seems willing to follow recommendations written out  Encouraged them to be sure a source of carbohydrate is eaten when a salad is the main meal at suppertime  Advised a small snack in the evenings to allay hunger and which may also lead to improved FBG the next morning  Created an individualized meal plan with 3 meals and 2-3 snacks daily  Wrote out guidelines for spacing meal and snacks apart from each other  Blood Glucose log does reveal -190 this past week which is an improvement from the 2 weeks prior when all readings were 180-190  This is his initial assessment    Present at session: patient  and spouse    Data:      He has no special learning needs  His caloric needs are 1700  He has had no recent weight change  Patient  and spouse  shops for food  Spouse  cooks the food  Exercise routine:  Uses treadmill in exercise center in neighborhood for 30 minutes most days   He eats breakfast at  7-8 AM 2 pieces of raisin bread w/ peanut butter & 6 oz  fruit juice, may be diluted a little w/ water   He snacks at 10:30-11 AM Granola bar & water   He eats lunch at  12:30-1 PM Yesterday at [employer], split a tuna panSoundHound & chips w/ wife  At home has main meal of the day at midday: casserole or meat & starch   He snacks at 4-4:30 PM Ice cream OR granola bar OR fruit   He eats dinner at  6-7 PM Large green salad w/ spring greens, tomato, onions, shredded cheese, hard boiled egg OR chicken   He snacks at 9 PM Few small pieces of candy, 3 PB stuffed pretzels     NUTRITION DIAGNOSES   Food And Nutrition Related Knowledge Defici   Food and nutrition related knowledge deficit related to  lack of prior exposure to accurate nutrition related information  As evidenced by  verbalizes inaccurate or incomplete information      Medical Nutrition Therapy Intervention: Carbohydrate counting, Meal planning, Individualized meal plan, Strategies to increase the intake of plant based foods, Label reading, Meal timing and Exercise Guidelines  His comprehension was good   His motivation was fair   His compliance was good   Goals:  1  30-45 g carb/meal  2  Keep food log before next visit  3  Have evening snack daily      Vitals  Signs   Recorded: 10Bmr8708 11:55AM   Weight: 186 lb   BMI Calculated: 30 02  BSA Calculated: 1 94    Future Appointments    Date/Time Provider Specialty Site   10/12/2016 01:00 PM FREDY Ludwig  [address]   02/08/2017 01:00 PM FREDY Bass   Orthopedic Surgery [facility] REH[facility]IL[facility] [place]   10/27/2016 11:30 AM Edita Aaron RD Diabetes Educator [facility] ENDOCRINOLOGY   10/27/2016 11:00 AM Bandar Bear [facility] Endocrinology [facility] ENDOCRINOLOGY     Signatures   Electronically signed by : Sadie Berry RD; Sep 15 2016 12:19PM EST                       (Author)    Electronically signed by : FREDY Delgado ; Sep 16 2016  8:46AM EST

## 2018-01-13 NOTE — RESULT NOTES
Message  I called patient with blood work results  Recommended to increase dose of vitamin D from 2000 IU to 4000 IU daily  Verified Results  (1) VITAMIN B12 38Tru0517 10:59AM [facility]: [id number]     Test Name Result Flag Reference   VITAMIN B12 791 pg/mL  100-900   - Patient Instructions: This bloodwork is non-fasting  Please drink two glasses of water morning of bloodwork  (1) FOLATE [id number] 10:59AM Tristha Eth   TW Order Number: [id number]     Test Name Result Flag Reference   FOLATE >20 0 ng/mL H 3 1-17 5   - Patient Instructions: This bloodwork is non-fasting  Please drink two glasses of water morning of bloodwork  (1) TSH 34PHO7345 10:59AM [facility] Number: [id number]     Test Name Result Flag Reference   TSH 0 744 uIU/mL  0 358-3 740   - Patient Instructions: This bloodwork is non-fasting  Please drink two glasses of water morning of bloodwork  - Patient Instructions: This bloodwork is non-fasting  Please drink two glasses of water morning of bloodwork  Patients undergoing fluorescein dye angiography may retain small amounts of fluorescein in the body for 48-72 hours post procedure  Samples containing fluorescein can produce falsely depressed TSH values  If the patient had this procedure,a specimen should be resubmitted post fluorescein clearance  (1) VITAMIN D 25-HYDROXY 63Gse3618 10:59AM [email or web address] [facility] Order Number: [id number]     Test Name Result Flag Reference   VIT D 25-HYDROX 28 5 ng/mL L 30 0-100 0   This assay is a certified procedure of the CDC Vitamin D Standardization Certification Program (VDSCP)     Deficiency <20ng/ml   Insufficiency 20-30ng/ml   Sufficient  ng/ml     *Patients undergoing fluorescein dye angiography may retain small amounts of fluorescein in the body for 48-72 hours post procedure   Samples containing fluorescein can produce falsely elevated Vitamin D values  If the patient had this procedure, a specimen should be resubmitted post fluorescein clearance         Signatures   Electronically signed by : RFEDY Whitmore ; Feb 26 2017  1:22PM EST                       (Author)

## 2018-01-14 VITALS
BODY MASS INDEX: 29.22 KG/M2 | WEIGHT: 181.8 LBS | TEMPERATURE: 97.7 F | HEART RATE: 60 BPM | SYSTOLIC BLOOD PRESSURE: 130 MMHG | DIASTOLIC BLOOD PRESSURE: 70 MMHG | RESPIRATION RATE: 16 BRPM | HEIGHT: 66 IN

## 2018-01-14 NOTE — MISCELLANEOUS
History of Present Illness  TCM Communication [facility]: The patient is being contacted for follow-up after hospitalization  He was hospitalized at [facility]  The dates of hospitalization: 5 days, date of admission: 2/3/2016, date of discharge: 2/8/2016  Diagnosis: S/P left Total Hip Arthroplasty  He was discharged to home, with home health services  He did not schedule a follow up appointment  He refused a follow up appointment due to will follow up with Dr Bobby Liriano  Follow-up appointments with other specialists: 2/18/2016 Dr Bobby Liriano  Communication performed and completed by A  Charli Mcmanus LPN      Active Problems    1  Allergic rhinitis (477 9) (J30 9)   2  Anxiety disorder (300 00) (F41 9)   3  Arthralgia of left hip (719 45) (M25 552)   4  Arthritis of multiple sites (716 99) (M19 90)   5  CAD (coronary atherosclerotic disease) (414 00) (I25 10)   6  Cervical spinal stenosis (723 0) (M48 02)   7  Chronic kidney disease, stage 3 (585 3) (N18 3)   8  Diabetes type 2, uncontrolled (250 02) (E11 65)   9  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)   10  Hyperlipidemia (272 4) (E78 5)   11  Hypertension (401 9) (I10)   12  Irritable bowel syndrome (564 1) (K58 9)   13  Lower back pain (724 2) (M54 5)   14  Lumbar spondylosis (721 3) (M47 816)   15  Microscopic hematuria (599 72) (R31 2)   16  Nodular 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 (600 10) (N40 2)   17  Osteoarthritis of left hip (715 95) (M16 12)   18  Primary osteoarthritis of left hip (715 15) (M16 12)   19  Screen for colon cancer (V76 51) (Z12 11)   20  Spondylolisthesis of lumbar region (738 4) (M43 16)   21  Type 2 diabetes mellitus with diabetic nephropathy (250 40,583 81) (E11 21)   22  Type 2 diabetes mellitus with diabetic neuropathy (250 60,357 2) (E11 40)    Past Medical History    1  History of Acute Cystitis (595 0)   2  History of Anal fissure (565 0) (K60 2)   3  History of Benign Polyps Of The Large Intestine (V12 72)   4   History of Degenerative joint disease of ankle and foot, unspecified laterality (715 97)   (M19 079)   5  History of Disc degeneration, lumbar (722 52) (M51 36)   6  History of Diverticulosis (562 10) (K57 90)   7  History of Erectile dysfunction of non-organic origin (302 72) (F52 21)   8  History of bronchitis (V12 69) (Z87 09)   9  History of gastritis (V12 79) (Z87 19)   10  History of hematuria (V13 09) (Z87 448)   11  History of onychomycosis (V12 09) (Z86 19)   12  History of Irritable bowel syndrome (564 1) (K58 9)   13  History of Lumbar radiculopathy (724 4) (M54 16)   14  History of Neoplasm of skin (239 2) (D49 2)   15  History of Osteoarthritis of hip (715 95) (M16 9)   16  History of Supraventricular tachycardia (427 89) (I47 1)    Surgical History    1  History of CABG   2  History of Cataract Surgery   3  History of Complete Colonoscopy   4  History of Diagnostic Esophagogastroduodenoscopy   5  History of Tonsillectomy   6  History of Umbilical Hernia Repair   7  History of Wrist Surgery    Family History    1  Family history of Dementia    2  Family history of Acute Myocardial Infarction (V17 3)    3  Family history of High blood pressure    Social History    · Former smoker (Q86 97) (X15 730)   · Never Drank Alcohol    Current Meds   1  Acarbose 50 MG Oral Tablet; Take 1 tab before breakfast and lunch,  take 2 tabs before   supper; Therapy: 60Mio1464 to (Evaluate:14Jun2016)  Requested for: 15NAH6930; Last   Rx:43Kpc5256 Ordered   2  ALPRAZolam XR 1 MG Oral Tablet Extended Release 24 Hour; TAKE 1 TABLET DAILY; Therapy: 45NSO8507 to (Evaluate:14Jun2016); Last Rx:42Mas3852 Ordered   3  Aspirin EC 81 MG Oral Tablet Delayed Release; Therapy: (Jerri Ferrari to Recorded   4  Bactroban Nasal 2 % Nasal Ointment; apply small amount in each nostril  1 x per day for   5 days prior to surgery; Therapy: 94ISK2257 to (Last Rx:71Hok3070)  Requested for: 92ETS8006 Ordered   5   Cinnamon 500 MG Oral Capsule; Therapy: (Recorded:62Zln8181) to Recorded   6  Ferrous Sulfate 325 (65 Fe) MG Oral Tablet; TAKE 1 TABLET 3 TIMES DAILY WITH FOOD; Therapy: 96LSU3873 to (Evaluate:05Jan2016)  Requested for: 58EAG2357; Last   Rx:55Qmh9627 Ordered   7  Folic Acid 1 MG Oral Tablet; TAKE 1 TABLET DAILY; Therapy: 10NUY3257 to (Adam Chacon)  Requested for: 63AMM2640; Last   Rx:04Jan2016 Ordered   8  Glimepiride 4 MG Oral Tablet; take 1 tab  BID; Therapy: 99Lxu1304 to (Evaluate:16Sep2016)  Requested for: 92Wea0550; Last   Rx:81Rqt4637 Ordered   9  Glucosamine Chondr 1500 Complx Oral Capsule; Therapy: 91YGX9124 to Recorded   10  Irbesartan 300 MG Oral Tablet; TAKE 1 TABLET DAILY AS     DIRECTED; Therapy: 34Qhu0315 to (Ute Maldonado)  Requested for: 38FEA1329; Last    Rx:06Oct2015 Ordered   11  Januvia 50 MG Oral Tablet; Take 1 tablet daily; Therapy: 36Swf7581 to (Evaluate:14Jun2016)  Requested for: 89QCH9826; Last    Rx:49Dys0107 Ordered   12  Omega 3 CAPS; Take 1 caps BID; Therapy: (Recorded:14Oct2015) to Recorded   13  Simvastatin 40 MG Oral Tablet; TAKE 1 TABLET DAILY; Therapy: 08GFK7754 to (Evaluate:14Jun2016)  Requested for: 53NOA1366; Last    Rx:44Hpo2211 Ordered   14  TraMADol HCl - 50 MG Oral Tablet; TAKE 1 TO 2 TABLETS EVERY 6 HOURS AS NEEDED    FOR PAIN;    Therapy: 89TMQ9440 to (Evaluate:10Jan2016); Last Rx:05Jan2016 Ordered   15  Vitamin C 500 MG Oral Capsule; TAKE 1 CAPSULE 3 times daily; Therapy: 58VAQ4667 to (Evaluate:14Feb2016)  Requested for: 97TEP8851; Last    Rx:67Kym2487 Ordered   16  Vitamin D 1000 UNIT Oral Tablet; take 2 tablet daily; Therapy: (Recorded:14Oct2015) to Recorded    Allergies    1  Actos TABS   2  BuSpar TABS   3  CeleXA TABS   4  LamISIL TABS   5  Paxil TABS   6  RA Tussin SYRP   7  Zoloft TABS    Health Management  Screen for colon cancer   COLONOSCOPY; every 5 years; Last 02Dec2014; Next Due: 02Dec2019;  Active  Health Maintenance   Medicare Annual Wellness Visit; every 1 year; Next Due: 04Bku2946; Overdue    Future Appointments    Date/Time Provider Specialty Site   02/18/2016 02:30 PM FREDY Winkler   Orthopedic Surgery [facility]     Signatures   Electronically signed by : FREDY Palomino ; Feb 11 2016  6:57PM EST                       (Author)

## 2018-01-14 NOTE — PROCEDURES
Chief Complaint  patient came in after using ear drops to get his ears flushed      Current Meds   1  Acarbose 50 MG Oral Tablet; Take 1 tab before breakfast and dinner,  take 2 tabs before   lunch; Therapy: 18Iug6027 to (Evaluate:76Olm9412)  Requested for: 16ARF9482; Last   Rx:30Agm4952 Ordered   2  ALPRAZolam XR 1 MG Oral Tablet Extended Release 24 Hour; TAKE 1 TABLET DAILY; Therapy: 05AFI7196 to (Evaluate:51Msu0783); Last Rx:21Nei0325 Ordered   3  Aspirin EC 81 MG Oral Tablet Delayed Release; Therapy: (0699 982 13 20) to Recorded   4  Glimepiride 4 MG Oral Tablet; take 1 tab  BID; Therapy: 72Pfi1603 to (Evaluate:11Jun2017)  Requested for: 73VDP2329; Last   Rx:71Cdd5156 Ordered   5  Irbesartan 300 MG Oral Tablet; TAKE 1 TABLET DAILY AS     DIRECTED; Therapy: 65Lbg3079 to (Evaluate:45Rlo7996)  Requested for: 98CJD1696; Last   Rx:09Dmj8401 Ordered   6  Januvia 50 MG Oral Tablet; Take 1 tablet daily; Therapy: 96Ujn5548 to (Evaluate:43Zkb0418)  Requested for: 27UNI7648; Last   Rx:15Jun2016 Ordered   7  Omega 3 CAPS; Take 1 caps BID; Therapy: (Recorded:14Oct2015) to Recorded   8  Simvastatin 40 MG Oral Tablet; TAKE 1 TABLET DAILY; Therapy: 55WWO9240 to (Evaluate:10Jun2017)  Requested for: 02DDB9737; Last   Rx:15Jun2016 Ordered   9  Vitamin D 1000 UNIT Oral Tablet; take 2 tablet daily; Therapy: (Recorded:47Itr7431) to Recorded    Allergies    1  Actos TABS   2  BuSpar TABS   3  CeleXA TABS   4  LamISIL TABS   5  Paxil TABS   6  RA Tussin SYRP   7  Zoloft TABS    Vitals  Signs    Systolic: 110  Diastolic: 70  Heart Rate: 68  Respiration: 12  Temperature: 97 9 F  Height: 5 ft 6 in  Weight: 186 lb 0 96 oz  BMI Calculated: 30 03  BSA Calculated: 1 95    Procedure    Procedure: cerumen removal    Indication: tympanic membrane(s) could not be visualized and cerumen impaction in both ears  Procedure Note: The procedure was performed by the Provider and lasted 15 minute(s)    The procedure required significant time and effort to remove the cerumen  A otoscope was placed in the ear canal(s) to visualize the ear canal debris  The ear was cleaned by using warm water irrigation and a curette  The procedure was successful  Post-Procedure:   Patient Status: the patient tolerated the procedure well  Complications: there were no complications  Patient instructions: dry ear precautions and avoid using q-tips  Follow-up as needed  Assessment    1  Impacted cerumen of both ears (380 4) (H61 23)    Plan  Impacted cerumen of both ears    · Removal Impacted Cerumen Requiring Instrumentation one or both ears - POC;  Status:Complete;   Done: 13ZOM2677 11:30AM    Future Appointments    Date/Time Provider Specialty Site   02/15/2017 01:00 PM FREDY Mitchell  [address] [place] [facility]   02/08/2017 01:00 PM FREDY Zhou  Orthopedic Surgery [facility]   02/23/2017 11:30 AM Radha King RD Diabetes Educator [facility] ENDOCRINOLOG[facility]   02/23/2017 10:40 AM FREDY Oshea   Endocrinology [facility] ENDOCRINOLOG[facility]     Signatures   Electronically signed by : FREDY Diaz ; Oct 28 2016 11:39AM EST                       (Author)

## 2018-01-15 NOTE — RESULT NOTES
Verified Results  (1) COMPREHENSIVE METABOLIC PANEL 33DCB3933 92:16CK Brady Steen Order Number: [id number]     Test Name Result Flag Reference   SODIUM 138 mmol/L  136-145   POTASSIUM 4 5 mmol/L  3 5-5 3   CHLORIDE 106 mmol/L  100-108   CARBON DIOXIDE 26 mmol/L  21-32   ANION GAP (CALC) 6 mmol/L  4-13   BLOOD UREA NITROGEN 29 mg/dL H 5-25   CREATININE 1 51 mg/dL H 0 60-1 30   Standardized to IDMS reference method   CALCIUM 9 0 mg/dL  8 3-10 1   BILI, TOTAL 0 60 mg/dL  0 20-1 00   ALK PHOSPHATAS 84 U/L     ALT (SGPT) 35 U/L  12-78   AST(SGOT) 20 U/L  5-45   ALBUMIN 3 5 g/dL  3 5-5 0   TOTAL PROTEIN 7 5 g/dL  6 4-8 2   eGFR Non-African American 44 6 ml/min/1 73sq m     National Kidney Disease Education Program recommendations are as follows:  GFR calculation is accurate only with a steady state creatinine  Chronic Kidney disease less than 60 ml/min/1 73 sq  meters  Kidney failure less than 15 ml/min/1 73 sq  meters     GLUCOSE FASTING 181 mg/dL H 65-99

## 2018-02-22 ENCOUNTER — TELEPHONE (OUTPATIENT)
Dept: OBGYN CLINIC | Facility: HOSPITAL | Age: 83
End: 2018-02-22

## 2018-02-22 NOTE — TELEPHONE ENCOUNTER
Patients wife is calling requesting premedication for the patient for his upcoming dental procedure  Gaby whitten    Would like the script faxed to dr Ariane Linder as well so they can take care of it for the future for the patient     Fax- [phone]

## 2018-02-22 NOTE — TELEPHONE ENCOUNTER
Gaby only requires dental prophylaxis for 2 years following joint replacement surgery  His 2 year anniversary was Feb 3rd  No longer requires antibiotics prior to dental procedures

## 2018-02-28 DIAGNOSIS — Z96.642 HISTORY OF HIP REPLACEMENT, TOTAL, LEFT: Primary | ICD-10-CM

## 2018-02-28 RX ORDER — CEPHALEXIN 500 MG/1
CAPSULE ORAL
Qty: 12 CAPSULE | Refills: 3 | Status: SHIPPED | OUTPATIENT
Start: 2018-02-28 | End: 2018-03-01

## 2018-02-28 NOTE — TELEPHONE ENCOUNTER
Patient's wife is calling about this again  Per Dr Alejandra Phillip antibiotics need to be for life throughout the practice

## 2018-02-28 NOTE — TELEPHONE ENCOUNTER
[facility] Patient  Hannah Skipper Hannah Skipper See message below  Far as I am aware this is not a [facility] patient

## 2018-02-28 NOTE — TELEPHONE ENCOUNTER
Message just received today    Cephalexin ordered on behalf of the patient    Phone call placed to his answering machine    The dentist office fax supplied can be apprised that we will provide the medication      Medication can be picked up at the pharmacy of record

## 2018-02-28 NOTE — TELEPHONE ENCOUNTER
I think she means we were told by from management that Per Denys Rendon we were told that premedication needs to be for life throughout the practice

## 2018-03-07 NOTE — PROCEDURES
Procedure    Surgeon: DR Curt Moses   Procedure: LEFT HIP   Cognitive Assessment   Cognitive Assessment: Mini- Cog Total Score: 3  NO CONCERNS   Depression Screening   Depression Screening: Total Score: Regis Montoya TAKES XANAX PRN FOR ANXIETY   Cardiac Risk Factors Include:   1) cad   2) htn   3) dm      Pulmonary Risk Factors Include:   1) denies   Patient was provided and educated on an incentive spirometer  Functional/Performance Assessment   The patient answered of the 8 questions with "No"  A caregiver assists the patient   Able to stand up from a chair by themselves and on the first try  Able to get dressed by self  Able to bathe by self  Able to make own meals  Able to get to bathroom by self  There is a bathroom in the home on the same floor they will sepnd most of their time in after surgery  Patient is able to obtain assistance after surgery in their home from a relative or other caregiver that does not reside with them  Patient receives help from: WIFE  and the caregiver's name is: WIFE,   Home health services have not been utilized in the past year   ~He/She does not own or use adaptive equipment and has experienced 0 fall(s) in the last year  Frailty Assessment   Frailty Score: 2-3  Weight Loss = 0  Decreased  Strength (weakness) = 0  Exhaustion, 1 = Some or a litle of the time (1-2 days)  Low Physical Activity = 1  Slowed Walking Speed  Interpretation of the Frailty Score, 2 - 3 = Intermediate frail (Pre-frail) inpatient physical therapy consult is appropriate  Gait & Mobility Assessment Patient required more than 15 seconds to complete TGUT  inpatient physical therapy consult is appropriate  STANDARD PT AFTER SURGERY   Nutritional Assessment r /rs  had not experienced unexplained weightloss in the past year  Caregiver burden score: (2) Moderate burden   Summary: PATIENT WALKED INTO PAT INDEPENDENTLY  DID FAIR  FAILED TUGT  1 STEP INTO HOME  LIVES WITH WIFE  NEEDS A WALKER  FELL 1 TIME LAST YEAR IN AN ICE STORM  ONCE IN THE HOME THERE IS A BEDROOM AND BATHROOM ON THE FIRST FLOOR  WIFE IS CONCERNED ABOUT  RETURNING HOME AFTER SURGERY WANTS TO MAKE SURE THAT  CAN GO HOME  1 STEP INTO THE HOME  NEEDS WALKER  PATIENT FAILED TUGT  SCORED PRE-FRAIL  POC IS TO HAVE SURGERY, STAY IN HOSPITAL AND THEN BE DC TO HOME  WIFE WILL BE THERE TO HELP  POSSIBLE TO SEND URINAL HOME WITH PATIENT  PATIENT IS OPEN TO VNA FROM   HAS A HX OF ANXIETY  APPEARS ANXIOUS AND TAKES PRN XANAX  DUE TO SCORE OF PRE-FRAIL, AND FAILED TUGT  AGE PATIENT IS HIGH RISK FOR FALLS, AND POSSIBLE INPATIENT REHAB ADMIT  WIFE IS PRESENT AND WILLING TO HELP HOWEVER HAS HER OWN MEDICALLY CONDITIONS TO TAKE CARE OF  THANKS CLINT        Active Problems    1  Allergic rhinitis (477 9) (J30 9)   2  Anxiety disorder (300 00) (F41 9)   3  Arthralgia of left hip (719 45) (M25 552)   4  Arthritis of multiple sites (716 99) (M19 90)   5  CAD (coronary atherosclerotic disease) (414 00) (I25 10)   6  Cervical spinal stenosis (723 0) (M48 02)   7  Chronic kidney disease, stage 3 (585 3) (N18 3)   8  Diabetes type 2, uncontrolled (250 02) (E11 65)   9  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)   10  Hyperlipidemia (272 4) (E78 5)   11  Hypertension (401 9) (I10)   12  Irritable bowel syndrome (564 1) (K58 9)   13  Lower back pain (724 2) (M54 5)   14  Lumbar spondylosis (721 3) (M47 816)   15  Microscopic hematuria (599 72) (R31 2)   16  Nodular 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 (600 10) (N40 2)   17  Osteoarthritis of left hip (715 95) (M16 12)   18  Primary osteoarthritis of left hip (715 15) (M16 12)   19  Screen for colon cancer (V76 51) (Z12 11)   20  Spondylolisthesis of lumbar region (738 4) (M43 16)   21  Type 2 diabetes mellitus with diabetic nephropathy (250 40,583 81) (E11 21)   22  Type 2 diabetes mellitus with diabetic neuropathy (250 60,357 2) (E11 40)    Current Meds    1   ALPRAZolam XR 1 MG Oral Tablet Extended Release 24 Hour; TAKE 1 TABLET DAILY; Therapy: 75QZB6243 to (Evaluate:14Jun2016); Last Rx:81Knd3170 Ordered    2  Folic Acid 1 MG Oral Tablet; TAKE 1 TABLET DAILY; Therapy: 71LQS7790 to (Floyd Shaffer)  Requested for: 40BIR3897; Last   Rx:04Jan2016 Ordered    3  Glimepiride 4 MG Oral Tablet; take 1 tab  BID; Therapy: 14Kyq0541 to (Evaluate:16Sep2016)  Requested for: 87Dna1855; Last   Rx:52Cqy2695 Ordered    4  Glucosamine Chondr 1500 Complx Oral Capsule; Therapy: 81UTY4152 to Recorded    5  Simvastatin 40 MG Oral Tablet; TAKE 1 TABLET DAILY; Therapy: 68QUP8224 to (Evaluate:14Jun2016)  Requested for: 63PUU9550; Last   Rx:19Pna4268 Ordered    6  Irbesartan 300 MG Oral Tablet; TAKE 1 TABLET DAILY AS     DIRECTED; Therapy: 13Uhi2279 to (Giuseppe Veloz)  Requested for: 58NTN2968; Last   Rx:06Oct2015 Ordered    7  Bactroban Nasal 2 % Nasal Ointment; apply small amount in each nostril  1 x per day for   5 days prior to surgery; Therapy: 46FJE2205 to (Last Rx:53Gzx8048)  Requested for: 77KUI1074 Ordered   8  Ferrous Sulfate 325 (65 Fe) MG Oral Tablet; TAKE 1 TABLET 3 TIMES DAILY WITH FOOD; Therapy: 86MGN2039 to (Evaluate:05Jan2016)  Requested for: 12XUK7234; Last   Rx:82Wbi4110 Ordered   9  Vitamin C 500 MG Oral Capsule; TAKE 1 CAPSULE 3 times daily; Therapy: 51FRY4621 to (Evaluate:14Feb2016)  Requested for: 39KAK7340; Last   Rx:08Xew7873 Ordered    10  Januvia 50 MG Oral Tablet; Take 1 tablet daily; Therapy: 29Qjo9359 to (Evaluate:14Jun2016)  Requested for: 50RUJ1696; Last    Rx:19Cjv9898 Ordered    11  TraMADol HCl - 50 MG Oral Tablet; TAKE 1 TO 2 TABLETS EVERY 6 HOURS AS NEEDED    FOR PAIN;    Therapy: 55UPP8962 to (Evaluate:10Jan2016); Last Rx:05Jan2016 Ordered    12  Acarbose 50 MG Oral Tablet; Take 1 tab before breakfast and lunch,  take 2 tabs before    supper; Therapy: 96Cwx4516 to (Evaluate:14Jun2016)  Requested for: 00XPV3466; Last    Rx:22Zfc7458 Ordered    13  Aspirin EC 81 MG Oral Tablet Delayed Release; Therapy: (0660 303 88 06) to Recorded   14  Cinnamon 500 MG Oral Capsule; Therapy: (Recorded:89Ykm4327) to Recorded   15  Omega 3 CAPS; Take 1 caps BID; Therapy: (Recorded:14Oct2015) to Recorded   16  Vitamin D 1000 UNIT Oral Tablet; take 2 tablet daily; Therapy: (Recorded:14Oct2015) to Recorded    Allergies    1  Actos TABS   2  BuSpar TABS   3  CeleXA TABS   4  LamISIL TABS   5   Paxil TABS   6  RA Tussin SYRP   7  Zoloft TABS    Signatures   Electronically signed by : DOMINIQUE Coburn; Jan 19 2016  9:36AM EST                       (Author)    Electronically signed by : FREDY Hdez ; Jan 19 2016  5:40PM EST

## 2018-03-15 ENCOUNTER — TRANSCRIBE ORDERS (OUTPATIENT)
Dept: ADMINISTRATIVE | Facility: HOSPITAL | Age: 83
End: 2018-03-15

## 2018-03-15 ENCOUNTER — APPOINTMENT (OUTPATIENT)
Dept: LAB | Facility: MEDICAL CENTER | Age: 83
End: 2018-03-15
Payer: COMMERCIAL

## 2018-03-15 DIAGNOSIS — E11.21 DIABETIC GLOMERULOPATHY (HCC): ICD-10-CM

## 2018-03-15 DIAGNOSIS — N18.30 CHRONIC KIDNEY DISEASE, STAGE III (MODERATE) (HCC): ICD-10-CM

## 2018-03-15 DIAGNOSIS — I25.10 ATHEROSCLEROSIS OF NATIVE CORONARY ARTERY, ANGINA PRESENCE UNSPECIFIED, UNSPECIFIED WHETHER NATIVE OR TRANSPLANTED HEART: ICD-10-CM

## 2018-03-15 DIAGNOSIS — I10 ESSENTIAL HYPERTENSION, MALIGNANT: ICD-10-CM

## 2018-03-15 DIAGNOSIS — I25.10 ATHEROSCLEROSIS OF NATIVE CORONARY ARTERY, ANGINA PRESENCE UNSPECIFIED, UNSPECIFIED WHETHER NATIVE OR TRANSPLANTED HEART: Primary | ICD-10-CM

## 2018-03-15 LAB
ALBUMIN SERPL BCP-MCNC: 3.6 G/DL (ref 3.5–5)
ALP SERPL-CCNC: 82 U/L (ref 46–116)
ALT SERPL W P-5'-P-CCNC: 31 U/L (ref 12–78)
ANION GAP SERPL CALCULATED.3IONS-SCNC: 6 MMOL/L (ref 4–13)
AST SERPL W P-5'-P-CCNC: 15 U/L (ref 5–45)
BILIRUB SERPL-MCNC: 1.01 MG/DL (ref 0.2–1)
BUN SERPL-MCNC: 26 MG/DL (ref 5–25)
CALCIUM SERPL-MCNC: 9 MG/DL (ref 8.3–10.1)
CHLORIDE SERPL-SCNC: 103 MMOL/L (ref 100–108)
CHOLEST SERPL-MCNC: 124 MG/DL (ref 50–200)
CO2 SERPL-SCNC: 30 MMOL/L (ref 21–32)
CREAT SERPL-MCNC: 1.53 MG/DL (ref 0.6–1.3)
CREAT UR-MCNC: 220 MG/DL
GFR SERPL CREATININE-BSD FRML MDRD: 42 ML/MIN/1.73SQ M
GLUCOSE P FAST SERPL-MCNC: 242 MG/DL (ref 65–99)
HDLC SERPL-MCNC: 45 MG/DL (ref 40–60)
LDLC SERPL CALC-MCNC: 62 MG/DL (ref 0–100)
MICROALBUMIN UR-MCNC: 202 MG/L (ref 0–20)
MICROALBUMIN/CREAT 24H UR: 92 MG/G CREATININE (ref 0–30)
POTASSIUM SERPL-SCNC: 3.9 MMOL/L (ref 3.5–5.3)
PROT SERPL-MCNC: 7.4 G/DL (ref 6.4–8.2)
SODIUM SERPL-SCNC: 139 MMOL/L (ref 136–145)
TRIGL SERPL-MCNC: 84 MG/DL
TSH SERPL DL<=0.05 MIU/L-ACNC: 1.3 UIU/ML (ref 0.36–3.74)

## 2018-03-15 PROCEDURE — 36415 COLL VENOUS BLD VENIPUNCTURE: CPT

## 2018-03-15 PROCEDURE — 84443 ASSAY THYROID STIM HORMONE: CPT

## 2018-03-15 PROCEDURE — 80061 LIPID PANEL: CPT

## 2018-03-15 PROCEDURE — 82570 ASSAY OF URINE CREATININE: CPT | Performed by: FAMILY MEDICINE

## 2018-03-15 PROCEDURE — 83036 HEMOGLOBIN GLYCOSYLATED A1C: CPT

## 2018-03-15 PROCEDURE — 82043 UR ALBUMIN QUANTITATIVE: CPT | Performed by: FAMILY MEDICINE

## 2018-03-15 PROCEDURE — 80053 COMPREHEN METABOLIC PANEL: CPT

## 2018-04-03 PROBLEM — N17.9 AKI (ACUTE KIDNEY INJURY) (HCC): Status: RESOLVED | Noted: 2017-02-13 | Resolved: 2018-04-03

## 2018-04-03 PROBLEM — E11.21 DIABETIC NEPHROPATHY ASSOCIATED WITH TYPE 2 DIABETES MELLITUS (HCC): Status: ACTIVE | Noted: 2018-04-03

## 2018-04-03 PROBLEM — M17.12 PRIMARY OSTEOARTHRITIS OF LEFT KNEE: Status: ACTIVE | Noted: 2017-02-15

## 2018-04-03 PROBLEM — E55.9 VITAMIN D DEFICIENCY: Status: ACTIVE | Noted: 2017-02-26

## 2018-04-03 PROBLEM — G47.00 INSOMNIA: Status: ACTIVE | Noted: 2017-06-14

## 2018-04-03 PROBLEM — G31.84 MILD COGNITIVE IMPAIRMENT: Status: ACTIVE | Noted: 2017-10-19

## 2018-04-03 PROBLEM — R41.3 SHORT-TERM MEMORY LOSS: Status: ACTIVE | Noted: 2017-02-15

## 2018-04-03 PROBLEM — E11.42 DM TYPE 2 WITH DIABETIC PERIPHERAL NEUROPATHY (HCC): Status: ACTIVE | Noted: 2018-04-03

## 2018-04-04 ENCOUNTER — OFFICE VISIT (OUTPATIENT)
Dept: FAMILY MEDICINE CLINIC | Facility: CLINIC | Age: 83
End: 2018-04-04
Payer: COMMERCIAL

## 2018-04-04 VITALS
SYSTOLIC BLOOD PRESSURE: 150 MMHG | WEIGHT: 178.4 LBS | TEMPERATURE: 97.6 F | HEIGHT: 67 IN | HEART RATE: 60 BPM | BODY MASS INDEX: 28 KG/M2 | DIASTOLIC BLOOD PRESSURE: 70 MMHG | RESPIRATION RATE: 16 BRPM

## 2018-04-04 DIAGNOSIS — N18.30 CHRONIC KIDNEY DISEASE, STAGE 3 (HCC): ICD-10-CM

## 2018-04-04 DIAGNOSIS — E78.2 MIXED HYPERLIPIDEMIA: ICD-10-CM

## 2018-04-04 DIAGNOSIS — E11.42 DM TYPE 2 WITH DIABETIC PERIPHERAL NEUROPATHY (HCC): Primary | ICD-10-CM

## 2018-04-04 DIAGNOSIS — I25.10 ATHEROSCLEROSIS OF NATIVE CORONARY ARTERY OF NATIVE HEART WITHOUT ANGINA PECTORIS: ICD-10-CM

## 2018-04-04 DIAGNOSIS — F41.1 GENERALIZED ANXIETY DISORDER: ICD-10-CM

## 2018-04-04 DIAGNOSIS — G31.84 MILD COGNITIVE IMPAIRMENT: ICD-10-CM

## 2018-04-04 DIAGNOSIS — E11.21 DIABETIC NEPHROPATHY ASSOCIATED WITH TYPE 2 DIABETES MELLITUS (HCC): ICD-10-CM

## 2018-04-04 PROCEDURE — 3008F BODY MASS INDEX DOCD: CPT | Performed by: FAMILY MEDICINE

## 2018-04-04 PROCEDURE — 99215 OFFICE O/P EST HI 40 MIN: CPT | Performed by: FAMILY MEDICINE

## 2018-04-04 PROCEDURE — 1101F PT FALLS ASSESS-DOCD LE1/YR: CPT | Performed by: FAMILY MEDICINE

## 2018-04-04 NOTE — PROGRESS NOTES
Assessment/Plan: 2  Type 2 DM - uncontrolled  Had a long discussion with patient regarding dietary modifications, blood sugar goals, complications of diabetes  Recommended to follow a carb diet, regular exercise  Schedule appointment with endocrinologist to review medical regimen  Will continue Glimepiride, Acarbose, Januvia until seen by endocrinology  Continue to monitor blood sugar at home  Check eye exam annually  Follow-up with podiatrist   Dr Palomo Finley for routine foot care  2 HTN - stable  Continue Irbesartan 300 mg daily       3  CKD stage 3 - creatinine is stable  Advised to stay well hydrated, avoid NSAID's  Will continue to monitor BUN, creatinine  Consider nephrology evaluation  4  CAD / Paroxysmal SVT/ S/P AV node ablation  Patient is asymptomatic   Continue statin, aspirin  Followup with cardiologist Dr Omar Beatty and Dr Ziggy Trejo     5  Hyperlipidemia - continue Simvastatin 40 mg daily  Follow a low cholesterol diet  6 Anxiety disorder - symptoms improved  Will gradually wean patient off of Alprazolam   Recommended to continue Aprazolam 0 5 mg  every other day for 2 weeks, then stop taking medication  Wife will  call office with update on symptoms in 2 weeks  Continue Melatonin 3 mg at bedtime for insomnia      7 Mild cognitive impairment - cognitive status is stable  Encouraged patient to stay mentally, socially and physically active  Family wants to postpone geriatric assessment as discussed previously  I have spent 40 minutes with patient and family today in which greater than 50% of this time was spent in counseling regarding diabetes management, diet, exercise, memory issues,  answering all patient questions  Schedule follow-up visit in 6 months  Check labs prior to next visit  Diagnoses and all orders for this visit:    DM type 2 with diabetic peripheral neuropathy ([facility] [address] )  -     Comprehensive metabolic panel;  Future  -     HEMOGLOBIN A1C W/ EAG ESTIMATION; Future  -     Ambulatory referral to Endocrinology; Future    Diabetic nephropathy associated with type 2 diabetes mellitus (HCC)    Chronic kidney disease, stage 3  -     CBC and differential; Future  -     Comprehensive metabolic panel; Future  -     Ambulatory referral to Endocrinology; Future    Mixed hyperlipidemia  -     Lipid panel; Future    Atherosclerosis of native coronary artery of native heart without angina pectoris    Generalized anxiety disorder    Mild cognitive impairment          Subjective:      Patient ID: Yvonne Barrera is a 80 y o  male  HPI     Patient is 66-year-old male with Type 2 DM with  diabetic nephropathy, diabetic neuropathy, HTN, CAD, SVT, CKD stage 3, Hyperlipidemia, chronic low back pain secondary to DDD, lumbar spondylosis, BPH, OA, Anxiety, mild cognitive impairment  He presents for 5 month followup visit accompanied by his wife  Reviewed all current medications, blood work results from 3/15/18  TSH 1 300, potassium 3 9,  creatinine 1 53 -stable,  GFR 42, fasting blood sugar 242, Hb  A1c 8 6 ( previously 8 3 in 10/17),  cholesterol 124, HDL 45, LDL 62  HTN -  patient takes Irbesartan 300 mg daily  Denies chest pain, shortness of breath, dizziness  Patient has H/o  SVT,  AV adrianna re-entry tachycardia  He has been followed by cardiologist Dr Sarah Byrnes and Dr Michaela Castro  Last seen by Dr Sarah Byrnes  in November 2017  Due to recurrent episodes of hypotension,  tachycardia,  dizziness patient underwent EPS and ablation in April 2017 and in October 2017  His symptoms significantly improved  Type 2 DM - uncontrolled  Hb A1C 8 6  Currently  taking Januvia 50 mg daily, Glimepiride 4 mg one tablet twice daily, Acarbose with meals  Denies hypoglycemic episodes  Eye exam done  by  Dr Theresa Velasco last year  Patient is seeing podiatrist Dr Bessy Lubin  Patient has mild cognitive impairment  C/o being forgetful    No significant cognitive decline since last visit  Family did not schedule geriatric assessment as recommended at the last visit  No behavioral disturbances  Anxiety disorder - symptoms improved  Dose of Alprazolam was gradually decreased to 0  5 mg daily with improvement in alertness according to patient's wife  Patient denies felling depressed  No anxiety attacks  Colonoscopy done in 12/14  Colorectal surgeon Dr Remy Jackson recommended to repeat colonoscopy in 5 years  Pneumovax done at the pharmacy 3 years ago  Patient is independent with all ADLs  No history of falls  He does not drive a car anymore  Denies tobacco use  The following portions of the patient's history were reviewed and updated as appropriate: allergies, current medications, past family history, past medical history, past social history, past surgical history and problem list     Review of Systems   Constitutional: Negative for activity change, appetite change, chills, fatigue, fever and unexpected weight change  HENT: Positive for hearing loss  Negative for congestion, ear discharge, nosebleeds, sore throat, tinnitus and trouble swallowing  Eyes: Negative for pain, discharge, redness, itching and visual disturbance  Respiratory: Negative for cough, chest tightness, shortness of breath and wheezing  Cardiovascular: Negative for chest pain, palpitations and leg swelling  Gastrointestinal: Negative for abdominal pain, blood in stool, constipation, diarrhea, nausea and vomiting  Genitourinary: Negative for difficulty urinating, dysuria and hematuria  Nocturia   Musculoskeletal: Positive for arthralgias (pain in legs, occasional back pain)  Negative for joint swelling  Skin: Negative for rash and wound  Neurological: Negative for dizziness, weakness, numbness and headaches  Hematological: Negative  Psychiatric/Behavioral: Positive for sleep disturbance  Negative for agitation, confusion and suicidal ideas          Anxiety - symptoms improved         Objective:    /70   Pulse 60   Temp 97 6 °F (36 4 °C) (Tympanic)   Resp 16   Ht 5' 7" (1 702 m)   Wt 80 9 kg (178 lb 6 4 oz)   BMI 27 94 kg/m²        Physical Exam   Constitutional: He is oriented to person, place, and time  He appears well-developed and well-nourished  HENT:   Head: Normocephalic and atraumatic  Right Ear: External ear normal    Left Ear: External ear normal    Mouth/Throat: Oropharynx is clear and moist    Eyes: Conjunctivae are normal  Pupils are equal, round, and reactive to light  Neck: Normal range of motion  Neck supple  No JVD present  Cardiovascular: Normal rate, regular rhythm and normal heart sounds  No murmur heard  No carotid bruits BL  No BL LE edema   Pulmonary/Chest: Effort normal and breath sounds normal  He has no wheezes  He has no rales  Abdominal: Soft  Bowel sounds are normal  There is no tenderness  No abdominal bruits   Musculoskeletal: Normal range of motion  He exhibits no edema, tenderness or deformity  Neurological: He is alert and oriented to person, place, and time  Coordination normal    Skin: Skin is warm and dry  No rash noted  Psychiatric: He has a normal mood and affect  His behavior is normal    Nursing note and vitals reviewed

## 2018-04-10 ENCOUNTER — TELEPHONE (OUTPATIENT)
Dept: FAMILY MEDICINE CLINIC | Facility: CLINIC | Age: 83
End: 2018-04-10

## 2018-04-24 ENCOUNTER — TELEPHONE (OUTPATIENT)
Dept: FAMILY MEDICINE CLINIC | Facility: CLINIC | Age: 83
End: 2018-04-24

## 2018-05-10 ENCOUNTER — HOSPITAL ENCOUNTER (EMERGENCY)
Facility: HOSPITAL | Age: 83
Discharge: HOME | End: 2018-05-10
Attending: EMERGENCY MEDICINE | Admitting: EMERGENCY MEDICINE
Payer: COMMERCIAL

## 2018-05-10 ENCOUNTER — APPOINTMENT (EMERGENCY)
Dept: RADIOLOGY | Facility: HOSPITAL | Age: 83
End: 2018-05-10
Payer: COMMERCIAL

## 2018-05-10 VITALS
SYSTOLIC BLOOD PRESSURE: 163 MMHG | TEMPERATURE: 97.8 F | WEIGHT: 175 LBS | BODY MASS INDEX: 27.41 KG/M2 | HEART RATE: 68 BPM | DIASTOLIC BLOOD PRESSURE: 77 MMHG | RESPIRATION RATE: 13 BRPM | OXYGEN SATURATION: 95 %

## 2018-05-10 DIAGNOSIS — R00.0 TACHYCARDIA: Primary | ICD-10-CM

## 2018-05-10 DIAGNOSIS — I10 HYPERTENSION: ICD-10-CM

## 2018-05-10 LAB
ALBUMIN SERPL BCP-MCNC: 3.7 G/DL (ref 3.5–5)
ALP SERPL-CCNC: 82 U/L (ref 46–116)
ALT SERPL W P-5'-P-CCNC: 39 U/L (ref 12–78)
ANION GAP SERPL CALCULATED.3IONS-SCNC: 7 MMOL/L (ref 4–13)
APTT PPP: 29 SECONDS (ref 23–35)
AST SERPL W P-5'-P-CCNC: 19 U/L (ref 5–45)
ATRIAL RATE: 78 BPM
BASOPHILS # BLD AUTO: 0.02 THOUSANDS/ΜL (ref 0–0.1)
BASOPHILS NFR BLD AUTO: 0 % (ref 0–1)
BILIRUB SERPL-MCNC: 0.59 MG/DL (ref 0.2–1)
BUN SERPL-MCNC: 22 MG/DL (ref 5–25)
CALCIUM SERPL-MCNC: 9.5 MG/DL (ref 8.3–10.1)
CHLORIDE SERPL-SCNC: 102 MMOL/L (ref 100–108)
CO2 SERPL-SCNC: 30 MMOL/L (ref 21–32)
CREAT SERPL-MCNC: 1.5 MG/DL (ref 0.6–1.3)
EOSINOPHIL # BLD AUTO: 0.18 THOUSAND/ΜL (ref 0–0.61)
EOSINOPHIL NFR BLD AUTO: 2 % (ref 0–6)
ERYTHROCYTE [DISTWIDTH] IN BLOOD BY AUTOMATED COUNT: 12.8 % (ref 11.6–15.1)
GFR SERPL CREATININE-BSD FRML MDRD: 43 ML/MIN/1.73SQ M
GLUCOSE SERPL-MCNC: 323 MG/DL (ref 65–140)
HCT VFR BLD AUTO: 47.5 % (ref 36.5–49.3)
HGB BLD-MCNC: 17.4 G/DL (ref 12–17)
INR PPP: 1.07 (ref 0.86–1.16)
LYMPHOCYTES # BLD AUTO: 1.26 THOUSANDS/ΜL (ref 0.6–4.47)
LYMPHOCYTES NFR BLD AUTO: 13 % (ref 14–44)
MAGNESIUM SERPL-MCNC: 2.1 MG/DL (ref 1.6–2.6)
MCH RBC QN AUTO: 32.8 PG (ref 26.8–34.3)
MCHC RBC AUTO-ENTMCNC: 36.6 G/DL (ref 31.4–37.4)
MCV RBC AUTO: 90 FL (ref 82–98)
MONOCYTES # BLD AUTO: 0.83 THOUSAND/ΜL (ref 0.17–1.22)
MONOCYTES NFR BLD AUTO: 9 % (ref 4–12)
NEUTROPHILS # BLD AUTO: 7.14 THOUSANDS/ΜL (ref 1.85–7.62)
NEUTS SEG NFR BLD AUTO: 76 % (ref 43–75)
NRBC BLD AUTO-RTO: 0 /100 WBCS
P AXIS: 44 DEGREES
PLATELET # BLD AUTO: 161 THOUSANDS/UL (ref 149–390)
PMV BLD AUTO: 9.7 FL (ref 8.9–12.7)
POTASSIUM SERPL-SCNC: 4.1 MMOL/L (ref 3.5–5.3)
PR INTERVAL: 242 MS
PROT SERPL-MCNC: 7.8 G/DL (ref 6.4–8.2)
PROTHROMBIN TIME: 13.9 SECONDS (ref 12.1–14.4)
QRS AXIS: -36 DEGREES
QRSD INTERVAL: 98 MS
QT INTERVAL: 368 MS
QTC INTERVAL: 419 MS
RBC # BLD AUTO: 5.3 MILLION/UL (ref 3.88–5.62)
SODIUM SERPL-SCNC: 139 MMOL/L (ref 136–145)
T WAVE AXIS: 36 DEGREES
TROPONIN I SERPL-MCNC: <0.02 NG/ML
TSH SERPL DL<=0.05 MIU/L-ACNC: 1.81 UIU/ML (ref 0.36–3.74)
VENTRICULAR RATE: 78 BPM
WBC # BLD AUTO: 9.43 THOUSAND/UL (ref 4.31–10.16)

## 2018-05-10 PROCEDURE — 84443 ASSAY THYROID STIM HORMONE: CPT | Performed by: EMERGENCY MEDICINE

## 2018-05-10 PROCEDURE — 93010 ELECTROCARDIOGRAM REPORT: CPT | Performed by: INTERNAL MEDICINE

## 2018-05-10 PROCEDURE — 80053 COMPREHEN METABOLIC PANEL: CPT | Performed by: EMERGENCY MEDICINE

## 2018-05-10 PROCEDURE — 93005 ELECTROCARDIOGRAM TRACING: CPT

## 2018-05-10 PROCEDURE — 36415 COLL VENOUS BLD VENIPUNCTURE: CPT | Performed by: EMERGENCY MEDICINE

## 2018-05-10 PROCEDURE — 99284 EMERGENCY DEPT VISIT MOD MDM: CPT

## 2018-05-10 PROCEDURE — 85610 PROTHROMBIN TIME: CPT | Performed by: EMERGENCY MEDICINE

## 2018-05-10 PROCEDURE — 84484 ASSAY OF TROPONIN QUANT: CPT | Performed by: EMERGENCY MEDICINE

## 2018-05-10 PROCEDURE — 85730 THROMBOPLASTIN TIME PARTIAL: CPT | Performed by: EMERGENCY MEDICINE

## 2018-05-10 PROCEDURE — 83735 ASSAY OF MAGNESIUM: CPT | Performed by: EMERGENCY MEDICINE

## 2018-05-10 PROCEDURE — 85025 COMPLETE CBC W/AUTO DIFF WBC: CPT | Performed by: EMERGENCY MEDICINE

## 2018-05-10 PROCEDURE — 71046 X-RAY EXAM CHEST 2 VIEWS: CPT

## 2018-05-10 NOTE — ED PROVIDER NOTES
History  Chief Complaint   Patient presents with    High Blood Pressure     High blood pressure today  Pt's wife reports that his heart rate was elevated today in the 140s today  Denies CP/ SOB  Has had two ablations in the past         History provided by:  Patient   used: No    Hypertension   Severity:  Moderate  Onset quality:  Gradual  Duration:  1 day  Timing:  Intermittent  Progression:  Unchanged  Chronicity:  Recurrent  Context comment:  Hx of PSVT, HTN, s/p Ablation x2  Relieved by:  Nothing  Worsened by:  Nothing  Ineffective treatments:  None tried  Associated symptoms: no abdominal pain, no blurred vision, no chest pain, no dizziness, no fever, no headaches, no loss of consciousness, no nausea, no neck pain, no shortness of breath and not vomiting    Risk factors: cardiac disease and diabetes        Prior to Admission Medications   Prescriptions Last Dose Informant Patient Reported? Taking? ALPRAZolam (XANAX) 1 mg tablet  Self Yes No   Sig: Take 0 5 mg by mouth daily     Multiple Vitamin (MULTIVITAMIN) tablet  Self Yes No   Sig: Take 1 tablet by mouth daily   Omega-3 Fatty Acids (FISH OIL PO)  Self Yes No   Sig: Take 1 tablet by mouth 2 (two) times a day   acarbose (PRECOSE) 50 mg tablet  Self Yes No   Sig: Take 50 mg by mouth 2 (two) times a day before breakfast and lunch     acetaminophen (TYLENOL) 325 mg tablet  Self No No   Simg by mouth every 6 hours for pain  Disp #90 Refills: 1   aspirin 81 MG tablet  Self Yes No   Sig: Take 81 mg by mouth daily  cholecalciferol (VITAMIN D3) 1,000 units tablet  Self Yes No   Sig: Take 2,000 Units by mouth 2 (two) times a day     glimepiride (AMARYL) 4 mg tablet  Self Yes No   Sig: Take 4 mg by mouth 2 (two) times a day Indications: took 1/2 dose at supper  irbesartan (AVAPRO) 300 mg tablet  Self Yes No   Sig: Take 300 mg by mouth daily after dinner       melatonin 3 mg  Self Yes No   Sig: Take 3 mg by mouth daily at bedtime simvastatin (ZOCOR) 40 mg tablet  Self Yes No   Sig: Take 40 mg by mouth daily at bedtime  sitaGLIPtin (JANUVIA) 100 mg tablet  Self Yes No   Sig: Take 50 mg by mouth daily        Facility-Administered Medications: None       Past Medical History:   Diagnosis Date    Anal fissure     Anxiety     Cervical spinal stenosis     CKD (chronic kidney disease) stage 3, GFR 30-59 ml/min     Coronary artery disease     Degenerative joint disease of ankle and foot, unspecified laterality     Disc degeneration, lumbar     Diverticulosis     Erectile dysfunction of non-organic origin     Hematuria     last assessed 8/28/12    Hyperlipidemia     Hypertension     IBS (irritable bowel syndrome)     Lumbar radiculopathy     Multiple benign polyps of large intestine     benign, last assessed 6/26/12    Osteoarthritis     hip    PSVT (paroxysmal supraventricular tachycardia) ([facility] [address] ) 02/2013    S/P CABG x [address] [facility]    Spondylolisthesis of lumbar region     Type 2 diabetes mellitus with diabetic neuropathy (HCC)        Past Surgical History:   Procedure Laterality Date    CATARACT EXTRACTION Bilateral     COLONOSCOPY      COLONOSCOPY  12/2014    CORONARY ARTERY BYPASS GRAFT      1999 cabgx4    ESOPHAGOGASTRODUODENOSCOPY      NE TOTAL HIP ARTHROPLASTY Left 2/3/2016    Procedure: ARTHROPLASTY HIP TOTAL;  Surgeon: Chela Brown MD;  Location: [facility];  Service: Orthopedics    TONSILLECTOMY      UMBILICAL HERNIA REPAIR      WRIST SURGERY Left        Family History   Problem Relation Age of Onset    Heart disease Mother     Dementia Mother     Heart disease Father     Heart attack Father      acute MI    Hypertension Other      I have reviewed and agree with the history as documented      Social History   Substance Use Topics    Smoking status: Former Smoker    Smokeless tobacco: Never Used    Alcohol use No        Review of Systems   Constitutional: Negative for activity change, chills and fever  HENT: Negative for facial swelling, sore throat and trouble swallowing  Eyes: Negative for blurred vision, pain and visual disturbance  Respiratory: Negative for cough, chest tightness and shortness of breath  Cardiovascular: Negative for chest pain and leg swelling  Gastrointestinal: Negative for abdominal pain, blood in stool, diarrhea, nausea and vomiting  Genitourinary: Negative for dysuria and flank pain  Musculoskeletal: Negative for back pain, neck pain and neck stiffness  Skin: Negative for pallor and rash  Allergic/Immunologic: Negative for environmental allergies and immunocompromised state  Neurological: Negative for dizziness, loss of consciousness and headaches  Hematological: Negative for adenopathy  Does not bruise/bleed easily  Psychiatric/Behavioral: Negative for agitation and behavioral problems  All other systems reviewed and are negative  Physical Exam  ED Triage Vitals [05/10/18 1813]   Temperature Pulse Respirations Blood Pressure SpO2   97 8 °F (36 6 °C) 80 18 (!) 208/88 96 %      Temp Source Heart Rate Source Patient Position - Orthostatic VS BP Location FiO2 (%)   Temporal Monitor Sitting Right arm --      Pain Score       No Pain           Orthostatic Vital Signs  Vitals:    05/10/18 1844 05/10/18 1947 05/10/18 2001 05/10/18 2030   BP: (!) 184/90 (!) 174/84 (!) 190/87 163/77   Pulse: 75 74 71 68   Patient Position - Orthostatic VS:           Physical Exam   Constitutional: He is oriented to person, place, and time  He appears well-developed and well-nourished  No distress  HENT:   Head: Normocephalic and atraumatic  Eyes: EOM are normal    Neck: Normal range of motion  Neck supple  Cardiovascular: Normal rate, regular rhythm, normal heart sounds and intact distal pulses  Pulmonary/Chest: Effort normal and breath sounds normal    Abdominal: Soft  Bowel sounds are normal  There is no tenderness   There is no rebound and no guarding  Musculoskeletal: Normal range of motion  Neurological: He is alert and oriented to person, place, and time  No cranial nerve deficit  Coordination normal    Skin: Skin is warm and dry  Psychiatric: He has a normal mood and affect  Nursing note and vitals reviewed  ED Medications  Medications - No data to display    Diagnostic Studies  Results Reviewed     Procedure Component Value Units Date/Time    TSH [[id number]]  (Normal) Collected:  05/10/18 1929    Lab Status:  Final result Specimen:  Blood from Arm, Right Updated:  05/10/18 2000     TSH 3RD GENERATON 1 810 uIU/mL     Narrative:         Patients undergoing fluorescein dye angiography may retain small amounts of fluorescein in the body for 48-72 hours post procedure  Samples containing fluorescein can produce falsely depressed TSH values  If the patient had this procedure,a specimen should be resubmitted post fluorescein clearance  Magnesium [[id number]]  (Normal) Collected:  05/10/18 1929    Lab Status:  Final result Specimen:  Blood from Arm, Right Updated:  05/10/18 2000     Magnesium 2 1 mg/dL     Comprehensive metabolic panel [[id number]]  (Abnormal) Collected:  05/10/18 1929    Lab Status:  Final result Specimen:  Blood from Arm, Right Updated:  05/10/18 1955     Sodium 139 mmol/L      Potassium 4 1 mmol/L      Chloride 102 mmol/L      CO2 30 mmol/L      Anion Gap 7 mmol/L      BUN 22 mg/dL      Creatinine 1 50 (H) mg/dL      Glucose 323 (H) mg/dL      Calcium 9 5 mg/dL      AST 19 U/L      ALT 39 U/L      Alkaline Phosphatase 82 U/L      Total Protein 7 8 g/dL      Albumin 3 7 g/dL      Total Bilirubin 0 59 mg/dL      eGFR 43 ml/min/1 73sq m     Narrative:         National Kidney Disease Education Program recommendations are as follows:  GFR calculation is accurate only with a steady state creatinine  Chronic Kidney disease less than 60 ml/min/1 73 sq  meters  Kidney failure less than 15 ml/min/1 73 sq  meters      Troponin I [[id number]]  (Normal) Collected:  05/10/18 1929    Lab Status:  Final result Specimen:  Blood from Arm, Right Updated:  05/10/18 1953     Troponin I <0 02 ng/mL     Narrative:         [employer] Chemistry analyzer 99% cutoff is > 0 04 ng/mL in network labs    o cTnI 99% cutoff is useful only when applied to patients in the clinical setting of myocardial ischemia  o cTnI 99% cutoff should be interpreted in the context of clinical history, ECG findings and possibly cardiac imaging to establish correct diagnosis  o cTnI 99% cutoff may be suggestive but clearly not indicative of a coronary event without the clinical setting of myocardial ischemia  Protime-INR [[id number]]  (Normal) Collected:  05/10/18 1929    Lab Status:  Final result Specimen:  Blood from Arm, Right Updated:  05/10/18 1944     Protime 13 9 seconds      INR 1 07    APTT [[id number]]  (Normal) Collected:  05/10/18 1929    Lab Status:  Final result Specimen:  Blood from Arm, Right Updated:  05/10/18 1944     PTT 29 seconds     CBC and differential [[id number]]  (Abnormal) Collected:  05/10/18 1929    Lab Status:  Final result Specimen:  Blood from Arm, Right Updated:  05/1935     WBC 9 43 Thousand/uL      RBC 5 30 Million/uL      Hemoglobin 17 4 (H) g/dL      Hematocrit 47 5 %      MCV 90 fL      MCH 32 8 pg      MCHC 36 6 g/dL      RDW 12 8 %      MPV 9 7 fL      Platelets 297 Thousands/uL      nRBC 0 /100 WBCs      Neutrophils Relative 76 (H) %      Lymphocytes Relative 13 (L) %      Monocytes Relative 9 %      Eosinophils Relative 2 %      Basophils Relative 0 %      Neutrophils Absolute 7 14 Thousands/µL      Lymphocytes Absolute 1 26 Thousands/µL      Monocytes Absolute 0 83 Thousand/µL      Eosinophils Absolute 0 18 Thousand/µL      Basophils Absolute 0 02 Thousands/µL                  XR chest 2 views   Final Result by Esteban Justin MD (05/10 2145)      No acute cardiopulmonary disease              Workstation performed: [id number] Procedures  ECG 12 Lead Documentation  Date/Time: 5/10/2018 7:36 PM  Performed by: Nolberto Fu by: Mikey Medrano     Indications / Diagnosis:  Tachycardia  ECG reviewed by me, the ED Provider: yes    Patient location:  ED  Interpretation:     Interpretation: normal    Rate:     ECG rate:  78    ECG rate assessment: normal    Rhythm:     Rhythm: sinus rhythm    Ectopy:     Ectopy: none    QRS:     QRS axis:  Normal  Conduction:     Conduction: normal    ST segments:     ST segments:  Normal  T waves:     T waves: normal             Phone Contacts  ED Phone Contact    ED Course  ED Course as of May 11 0314   Thu May 10, 2018   2007 Creatinine: (!) 1 50   2007 Glucose: (!) 323   2007 Anion Gap: 7   2007 CO2: 30   2007 Labs reviewed, elevated Glucose noted, no AG or acidosis on CMP, Trop negative, mag normal; Crea at baseline  Troponin I: <0 02 2057 Patient offered hospital observation for monitoring of possible  supraventricular tachycardia which he has history of and apparently he had an episode of at home;  patient feels normal currently in wants to go home  ER workup is nonacute, vital signs have remained stable, we will give return instructions, follow up with cardiologist                                 MDM  Number of Diagnoses or Management Options  Hypertension: new and requires workup  Tachycardia: new and requires workup  Diagnosis management comments: Patient is an 66-year-old male, history of hypertension, diabetes, coronary artery disease, cardiac bypass, paroxysmal supraventricular tachycardia, status post ablation x 2; comes in with complaints of not feeling well at home, asked his wife to check his blood pressure, which was high, 198/105, 2112/141; during one of the reads, heart rate was 143; denies chest pain, dyspnea, fever, cough or any other recent illness, patient has chronic leg pains and numbness, possibly from diabetic neuropathy    On exam no acute distress:  Vital signs stable, lungs clear, cardiovascular normal sinus rhythm, heart sounds normal, no peripheral edema, abdomen soft nontender, no calf swelling or tenderness  Impression:  Possible paroxysmal SVT recurrence; will check labs including CBC, CMP, magnesium, troponin, EKG, chest x-ray  Amount and/or Complexity of Data Reviewed  Clinical lab tests: ordered and reviewed  Tests in the radiology section of CPT®: ordered and reviewed  Tests in the medicine section of CPT®: reviewed and ordered  Independent visualization of images, tracings, or specimens: yes      CritCare Time    Disposition  Final diagnoses:   Tachycardia   Hypertension     Time reflects when diagnosis was documented in both MDM as applicable and the Disposition within this note     Time User Action Codes Description Comment    5/10/2018  9:04 PM Jessica Prasadist Add [R00 0] Tachycardia     5/10/2018  9:04 PM Jenifer Hennessy [facility] Hypertension       ED Disposition     ED Disposition Condition Comment    Discharge  [address] [facility] discharge to home/self care  Condition at discharge: Stable        Follow-up Information     Follow up With Specialties Details Why [address] [facility] [address]  , [address] [facility]  [phone]          Please return to emergency for palpitations, tachycardia, hypertension or any other concerns  Discharge Medication List as of 5/10/2018  9:06 PM      CONTINUE these medications which have NOT CHANGED    Details   acarbose (PRECOSE) 50 mg tablet Take 50 mg by mouth 2 (two) times a day before breakfast and lunch  , Historical Med      acetaminophen (TYLENOL) 325 mg tablet 650mg by mouth every 6 hours for pain  Disp #90 Refills: 1, Print      ALPRAZolam (XANAX) 1 mg tablet Take 0 5 mg by mouth daily  , Historical Med      aspirin 81 MG tablet Take 81 mg by mouth daily  , Historical Med      cholecalciferol (VITAMIN D3) 1,000 units tablet Take 2,000 Units by mouth 2 (two) times a day  , Historical Med      glimepiride (AMARYL) 4 mg tablet Take 4 mg by mouth 2 (two) times a day Indications: took 1/2 dose at supper   , Historical Med      irbesartan (AVAPRO) 300 mg tablet Take 300 mg by mouth daily after dinner   , Historical Med      melatonin 3 mg Take 3 mg by mouth daily at bedtime, Historical Med      Multiple Vitamin (MULTIVITAMIN) tablet Take 1 tablet by mouth daily, Historical Med      Omega-3 Fatty Acids (FISH OIL PO) Take 1 tablet by mouth 2 (two) times a day, Historical Med      simvastatin (ZOCOR) 40 mg tablet Take 40 mg by mouth daily at bedtime  , Historical Med      sitaGLIPtin (JANUVIA) 100 mg tablet Take 50 mg by mouth daily  , Historical Med           No discharge procedures on file      ED Provider  Electronically Signed by           Chela Vasquez MD  05/11/18 0114

## 2018-05-11 DIAGNOSIS — E11.65 TYPE 2 DIABETES MELLITUS WITH HYPERGLYCEMIA, WITHOUT LONG-TERM CURRENT USE OF INSULIN (HCC): Primary | ICD-10-CM

## 2018-05-11 NOTE — DISCHARGE INSTRUCTIONS
Atrial Tachycardia   WHAT YOU SHOULD KNOW:   Atrial tachycardia (AT) is a condition that causes your heart to beat 100 to 300 times each minute  A normal heart rate at rest is 60 to 80 beats each minute  AT develops because of problems with your heart's electrical system  Your atria (top chambers) may send electrical signals that increase your heart rate, or the pathway of the electrical signal may be blocked  Your heart keeps sending signals to try to get past the block  CARE AGREEMENT:   You have the right to help plan your care  Learn about your health condition and how it may be treated  Discuss treatment options with your caregivers to decide what care you want to receive  You always have the right to refuse treatment  RISKS:   · Medicines to treat your AT may cause your heart to beat too slowly or your blood pressure to drop  Certain medicines may cause other types of heartbeat problems  Cardiac ablation can cause you to bleed, bruise, or get an infection where the wire was put in  Even after treatment, your AT and symptoms may return, and you may need more treatments  · Without treatment, your symptoms may get worse  Your heart may not be able to pump enough blood to supply oxygen to the rest of your body  You may get a blood clot The clot can break loose and travel to your lungs or brain  A blood clot can cause you to have a stroke  Your heart may weaken and not work properly  You are also at a higher risk for a heart attack and heart failure  WHILE YOU ARE HERE:   Informed consent  is a legal document that explains the tests, treatments, or procedures that you may need  Informed consent means you understand what will be done and can make decisions about what you want  You give your permission when you sign the consent form  You can have someone sign this form for you if you are not able to sign it  You have the right to understand your medical care in words you know   Before you sign the consent form, understand the risks and benefits of what will be done  Make sure all your questions are answered  An IV  is a small tube placed in your vein that is used to give you medicine or liquids  Heart monitor: This is also called an ECG or EKG  Sticky pads placed on your skin record your heart's electrical activity  Medicines:   · Antiarrhythmias: These help slow your heartbeat and make it more normal      · Beta blockers: These help keep your heartbeat in a regular rhythm  · Calcium channel blockers: These help slow your heartbeat  · Blood thinners: These help prevent blood clots  Clots can lead to stroke, heart attack, and death  Aspirin is a type of blood thinner  You may need to take an aspirin each day to help prevent blood clots  Do not take acetaminophen or ibuprofen instead  Do not take more or less aspirin than healthcare providers say to take  If you are on other blood thinner medicine, ask your healthcare provider before you take aspirin for any reason  · Electrolytes: You may be given electrolytes in the hospital if an electrolyte imbalance caused your AT  Tests:   · Blood tests: You may need blood taken to give caregivers information about how your body is working  The blood may be taken from your hand, arm, or IV  · Electrophysiologic study: An electrophysiologic study is used to chart the electrical pathways in your heart that control your heartbeat  Wires are guided through a blood vessel in your arm, neck, chest, or groin to your heart  Readings are taken through the wires  Your healthcare provider can also use these wires to trigger your heart rhythm problem  · An echocardiogram  is a type of ultrasound  Sound waves are used to show the structure and function of your heart  · ECG:  This is also called an EKG  An ECG is done to check for damage or problems in your heart  A short period of electrical activity in your heart is recorded   Lie as still as possible during the test      · Atrial electrograms:  Atrial electrograms are ECG recordings taken from temporary wires attached to your atria during heart surgery  The wires are connected to a monitor and show your heart's atrial activity  This test is done if healthcare providers believe you are having AT after heart surgery  · Exercise stress test:  An exercise stress test helps healthcare providers see the changes that take place in your heart during exercise  The test is done while you ride an exercise bike or walk on a treadmill  Healthcare providers will ask if you have chest pain or trouble breathing during the test   Treatment:   · Vagal maneuvers:  Vagal maneuvers (methods) can help slow the impulse from your atria to your ventricle and stop your AT  An example of a vagal maneuver is putting your face in ice cold water  Your healthcare provider may teach you other vagal maneuvers to do on your own when you have an episode of AT  · Cardioversion: This is a procedure where an electric shock is given to your heart  The shock is usually given through paddles or sticky patches placed on your chest or back  The shock helps your heart return to a normal beat  Cardioversion may be needed if medicine does not make your heart work better  You may need a cardioversion if your heart rhythm is making you sick or is dangerous  You may be given medicine to help you relax before getting the electric shock  If the shock works, your heart rate and rhythm will return to normal  Medicine may be needed to keep your heart in a normal rhythm  You may need a cardioversion more than once  · Cardiac ablation:  Cardiac ablation is a procedure that uses heat energy to stop abnormal heart impulses  A wire is guided to your heart through an artery or a vein  Your healthcare provider finds the area of the heart that is causing the problems and applies heat energy to it  This may help your heart beat in a more regular rhythm      · Pacemaker: This is a machine that helps your heart beat at a normal speed and in a regular rhythm  If your heart does not beat as it should, the pacemaker sends small electric signals to your heart  You may feel these signals  ¨ Temporary:  Large patches are placed on your chest and back  The patches are connected to a monitor  Your healthcare provider may need to put small wires through your skin and into your heart muscle instead  The wires are connected to a small pacemaker box outside of your body  ¨ Permanent:  A permanent pacemaker is about the size of a wristwatch  It is implanted under the skin of your chest     · Surgery: You may need surgery if other treatments do not work to stop your AT   © 2014 [address] is for End User's use only and may not be sold, redistributed or otherwise used for commercial purposes  All illustrations and images included in CareNotes® are the copyrighted property of [employer] , [employer]  or Jacob Warren  The above information is an  only  It is not intended as medical advice for individual conditions or treatments  Talk to your doctor, nurse or pharmacist before following any medical regimen to see if it is safe and effective for you

## 2018-05-13 RX ORDER — GLIMEPIRIDE 4 MG/1
TABLET ORAL
Qty: 180 TABLET | Refills: 3 | Status: SHIPPED | OUTPATIENT
Start: 2018-05-13 | End: 2019-05-18 | Stop reason: SDUPTHER

## 2018-05-13 RX ORDER — SITAGLIPTIN 50 MG/1
TABLET, FILM COATED ORAL
Qty: 90 TABLET | Refills: 3 | Status: SHIPPED | OUTPATIENT
Start: 2018-05-13 | End: 2019-11-13 | Stop reason: ALTCHOICE

## 2018-05-14 DIAGNOSIS — E11.40 TYPE 2 DIABETES MELLITUS WITH DIABETIC NEUROPATHY, WITHOUT LONG-TERM CURRENT USE OF INSULIN (HCC): Primary | ICD-10-CM

## 2018-05-14 RX ORDER — ACARBOSE 50 MG/1
TABLET ORAL
Qty: 360 TABLET | Refills: 3 | Status: SHIPPED | OUTPATIENT
Start: 2018-05-14 | End: 2018-11-07 | Stop reason: ALTCHOICE

## 2018-05-18 DIAGNOSIS — E78.5 HYPERLIPIDEMIA, UNSPECIFIED HYPERLIPIDEMIA TYPE: Primary | ICD-10-CM

## 2018-05-18 RX ORDER — SIMVASTATIN 40 MG
40 TABLET ORAL
Qty: 90 TABLET | Refills: 3 | Status: SHIPPED | OUTPATIENT
Start: 2018-05-18 | End: 2019-05-18 | Stop reason: SDUPTHER

## 2018-08-27 DIAGNOSIS — I10 ESSENTIAL HYPERTENSION: Primary | ICD-10-CM

## 2018-08-27 RX ORDER — IRBESARTAN 300 MG/1
300 TABLET ORAL
Qty: 90 TABLET | Refills: 3 | Status: SHIPPED | OUTPATIENT
Start: 2018-08-27 | End: 2019-05-08 | Stop reason: ALTCHOICE

## 2018-08-27 NOTE — TELEPHONE ENCOUNTER
Patient's wife called for a refill on Irbesartan (Avapro) 300 mg tablet 90 day supply to [facility] Can be reached at [phone] any questions

## 2018-10-20 ENCOUNTER — APPOINTMENT (OUTPATIENT)
Dept: LAB | Facility: MEDICAL CENTER | Age: 83
End: 2018-10-20
Payer: COMMERCIAL

## 2018-10-20 ENCOUNTER — TRANSCRIBE ORDERS (OUTPATIENT)
Dept: ADMINISTRATIVE | Facility: HOSPITAL | Age: 83
End: 2018-10-20

## 2018-10-20 DIAGNOSIS — E11.59 HYPERTENSION ASSOCIATED WITH DIABETES (HCC): Primary | ICD-10-CM

## 2018-10-20 DIAGNOSIS — I15.2 HYPERTENSION ASSOCIATED WITH DIABETES (HCC): ICD-10-CM

## 2018-10-20 DIAGNOSIS — E11.59 HYPERTENSION ASSOCIATED WITH DIABETES (HCC): ICD-10-CM

## 2018-10-20 DIAGNOSIS — I15.2 HYPERTENSION ASSOCIATED WITH DIABETES (HCC): Primary | ICD-10-CM

## 2018-10-20 LAB
ANION GAP SERPL CALCULATED.3IONS-SCNC: 7 MMOL/L (ref 4–13)
BUN SERPL-MCNC: 29 MG/DL (ref 5–25)
CALCIUM SERPL-MCNC: 9.2 MG/DL (ref 8.3–10.1)
CHLORIDE SERPL-SCNC: 103 MMOL/L (ref 100–108)
CO2 SERPL-SCNC: 25 MMOL/L (ref 21–32)
CREAT SERPL-MCNC: 1.5 MG/DL (ref 0.6–1.3)
EST. AVERAGE GLUCOSE BLD GHB EST-MCNC: 258 MG/DL
GFR SERPL CREATININE-BSD FRML MDRD: 43 ML/MIN/1.73SQ M
GLUCOSE SERPL-MCNC: 374 MG/DL (ref 65–140)
HBA1C MFR BLD: 10.6 % (ref 4.2–6.3)
POTASSIUM SERPL-SCNC: 4.3 MMOL/L (ref 3.5–5.3)
SODIUM SERPL-SCNC: 135 MMOL/L (ref 136–145)

## 2018-10-20 PROCEDURE — 80048 BASIC METABOLIC PNL TOTAL CA: CPT

## 2018-10-20 PROCEDURE — 83036 HEMOGLOBIN GLYCOSYLATED A1C: CPT

## 2018-10-20 PROCEDURE — 36415 COLL VENOUS BLD VENIPUNCTURE: CPT

## 2018-10-23 LAB
LEFT EYE DIABETIC RETINOPATHY: NORMAL
RIGHT EYE DIABETIC RETINOPATHY: NORMAL
SEVERITY (EYE EXAM): NORMAL

## 2018-11-01 ENCOUNTER — LAB (OUTPATIENT)
Dept: LAB | Facility: MEDICAL CENTER | Age: 83
End: 2018-11-01
Payer: COMMERCIAL

## 2018-11-01 DIAGNOSIS — E11.42 DM TYPE 2 WITH DIABETIC PERIPHERAL NEUROPATHY (HCC): ICD-10-CM

## 2018-11-01 DIAGNOSIS — N18.30 CHRONIC KIDNEY DISEASE, STAGE 3 (HCC): ICD-10-CM

## 2018-11-01 DIAGNOSIS — E78.2 MIXED HYPERLIPIDEMIA: ICD-10-CM

## 2018-11-01 LAB
ALBUMIN SERPL BCP-MCNC: 3.5 G/DL (ref 3.5–5)
ALP SERPL-CCNC: 92 U/L (ref 46–116)
ALT SERPL W P-5'-P-CCNC: 34 U/L (ref 12–78)
ANION GAP SERPL CALCULATED.3IONS-SCNC: 6 MMOL/L (ref 4–13)
AST SERPL W P-5'-P-CCNC: 16 U/L (ref 5–45)
BASOPHILS # BLD AUTO: 0.03 THOUSANDS/ΜL (ref 0–0.1)
BASOPHILS NFR BLD AUTO: 0 % (ref 0–1)
BILIRUB SERPL-MCNC: 0.93 MG/DL (ref 0.2–1)
BUN SERPL-MCNC: 27 MG/DL (ref 5–25)
CALCIUM SERPL-MCNC: 9.6 MG/DL (ref 8.3–10.1)
CHLORIDE SERPL-SCNC: 101 MMOL/L (ref 100–108)
CHOLEST SERPL-MCNC: 132 MG/DL (ref 50–200)
CO2 SERPL-SCNC: 29 MMOL/L (ref 21–32)
CREAT SERPL-MCNC: 1.35 MG/DL (ref 0.6–1.3)
EOSINOPHIL # BLD AUTO: 0.14 THOUSAND/ΜL (ref 0–0.61)
EOSINOPHIL NFR BLD AUTO: 2 % (ref 0–6)
ERYTHROCYTE [DISTWIDTH] IN BLOOD BY AUTOMATED COUNT: 12.2 % (ref 11.6–15.1)
EST. AVERAGE GLUCOSE BLD GHB EST-MCNC: 266 MG/DL
GFR SERPL CREATININE-BSD FRML MDRD: 49 ML/MIN/1.73SQ M
GLUCOSE P FAST SERPL-MCNC: 253 MG/DL (ref 65–99)
HBA1C MFR BLD: 10.9 % (ref 4.2–6.3)
HCT VFR BLD AUTO: 48.4 % (ref 36.5–49.3)
HDLC SERPL-MCNC: 44 MG/DL (ref 40–60)
HGB BLD-MCNC: 16.6 G/DL (ref 12–17)
IMM GRANULOCYTES # BLD AUTO: 0.06 THOUSAND/UL (ref 0–0.2)
IMM GRANULOCYTES NFR BLD AUTO: 1 % (ref 0–2)
LDLC SERPL CALC-MCNC: 65 MG/DL (ref 0–100)
LYMPHOCYTES # BLD AUTO: 1.06 THOUSANDS/ΜL (ref 0.6–4.47)
LYMPHOCYTES NFR BLD AUTO: 12 % (ref 14–44)
MCH RBC QN AUTO: 31.6 PG (ref 26.8–34.3)
MCHC RBC AUTO-ENTMCNC: 34.3 G/DL (ref 31.4–37.4)
MCV RBC AUTO: 92 FL (ref 82–98)
MONOCYTES # BLD AUTO: 0.66 THOUSAND/ΜL (ref 0.17–1.22)
MONOCYTES NFR BLD AUTO: 8 % (ref 4–12)
NEUTROPHILS # BLD AUTO: 6.61 THOUSANDS/ΜL (ref 1.85–7.62)
NEUTS SEG NFR BLD AUTO: 77 % (ref 43–75)
NONHDLC SERPL-MCNC: 88 MG/DL
NRBC BLD AUTO-RTO: 0 /100 WBCS
PLATELET # BLD AUTO: 198 THOUSANDS/UL (ref 149–390)
PMV BLD AUTO: 9.7 FL (ref 8.9–12.7)
POTASSIUM SERPL-SCNC: 4.1 MMOL/L (ref 3.5–5.3)
PROT SERPL-MCNC: 7.5 G/DL (ref 6.4–8.2)
RBC # BLD AUTO: 5.25 MILLION/UL (ref 3.88–5.62)
SODIUM SERPL-SCNC: 136 MMOL/L (ref 136–145)
TRIGL SERPL-MCNC: 114 MG/DL
WBC # BLD AUTO: 8.56 THOUSAND/UL (ref 4.31–10.16)

## 2018-11-01 PROCEDURE — 83036 HEMOGLOBIN GLYCOSYLATED A1C: CPT

## 2018-11-01 PROCEDURE — 36415 COLL VENOUS BLD VENIPUNCTURE: CPT

## 2018-11-01 PROCEDURE — 80053 COMPREHEN METABOLIC PANEL: CPT

## 2018-11-01 PROCEDURE — 85025 COMPLETE CBC W/AUTO DIFF WBC: CPT

## 2018-11-01 PROCEDURE — 80061 LIPID PANEL: CPT

## 2018-11-07 ENCOUNTER — OFFICE VISIT (OUTPATIENT)
Dept: FAMILY MEDICINE CLINIC | Facility: CLINIC | Age: 83
End: 2018-11-07
Payer: COMMERCIAL

## 2018-11-07 VITALS
RESPIRATION RATE: 16 BRPM | HEIGHT: 67 IN | BODY MASS INDEX: 26.74 KG/M2 | WEIGHT: 170.4 LBS | OXYGEN SATURATION: 95 % | TEMPERATURE: 97.7 F | DIASTOLIC BLOOD PRESSURE: 70 MMHG | HEART RATE: 80 BPM | SYSTOLIC BLOOD PRESSURE: 138 MMHG

## 2018-11-07 DIAGNOSIS — E11.42 DM TYPE 2 WITH DIABETIC PERIPHERAL NEUROPATHY (HCC): Primary | ICD-10-CM

## 2018-11-07 DIAGNOSIS — I10 ESSENTIAL HYPERTENSION: ICD-10-CM

## 2018-11-07 DIAGNOSIS — G31.84 MILD COGNITIVE IMPAIRMENT: ICD-10-CM

## 2018-11-07 DIAGNOSIS — F41.1 GENERALIZED ANXIETY DISORDER: ICD-10-CM

## 2018-11-07 DIAGNOSIS — N18.30 CHRONIC KIDNEY DISEASE, STAGE 3 (HCC): ICD-10-CM

## 2018-11-07 DIAGNOSIS — I47.1 PAROXYSMAL SVT (SUPRAVENTRICULAR TACHYCARDIA) (HCC): ICD-10-CM

## 2018-11-07 DIAGNOSIS — E78.2 MIXED HYPERLIPIDEMIA: ICD-10-CM

## 2018-11-07 DIAGNOSIS — R26.2 AMBULATORY DYSFUNCTION: ICD-10-CM

## 2018-11-07 DIAGNOSIS — I25.10 ATHEROSCLEROSIS OF NATIVE CORONARY ARTERY OF NATIVE HEART WITHOUT ANGINA PECTORIS: ICD-10-CM

## 2018-11-07 PROCEDURE — 1160F RVW MEDS BY RX/DR IN RCRD: CPT | Performed by: FAMILY MEDICINE

## 2018-11-07 PROCEDURE — 3075F SYST BP GE 130 - 139MM HG: CPT | Performed by: FAMILY MEDICINE

## 2018-11-07 PROCEDURE — 4040F PNEUMOC VAC/ADMIN/RCVD: CPT | Performed by: FAMILY MEDICINE

## 2018-11-07 PROCEDURE — 1036F TOBACCO NON-USER: CPT | Performed by: FAMILY MEDICINE

## 2018-11-07 PROCEDURE — 99215 OFFICE O/P EST HI 40 MIN: CPT | Performed by: FAMILY MEDICINE

## 2018-11-07 PROCEDURE — 3008F BODY MASS INDEX DOCD: CPT | Performed by: FAMILY MEDICINE

## 2018-11-07 PROCEDURE — 3078F DIAST BP <80 MM HG: CPT | Performed by: FAMILY MEDICINE

## 2018-11-07 PROCEDURE — 3725F SCREEN DEPRESSION PERFORMED: CPT | Performed by: FAMILY MEDICINE

## 2018-11-07 RX ORDER — ACARBOSE 100 MG/1
100 TABLET ORAL
COMMUNITY

## 2018-11-07 NOTE — PROGRESS NOTES
Chief Complaint   Patient presents with    Follow-up     6 month follow up     Health Maintenance   Topic Date Due    SLP PLAN OF CARE  1935    DTaP,Tdap,and Td Vaccines (1 - Tdap) 11/27/1956    Pneumococcal PPSV23/PCV13 65+ Years / Low and Medium Risk (2 of 2 - PCV13) 10/18/2008    DM Eye Exam  06/24/2017    Diabetic Foot Exam  05/30/2018    URINE MICROALBUMIN  03/15/2019    Fall Risk  04/04/2019    Depression Screening PHQ  04/04/2019    HEMOGLOBIN A1C  05/01/2019    CRC Screening: Colonoscopy  12/04/2021    INFLUENZA VACCINE  Completed     Assessment/Plan:    DM type 2 with diabetic peripheral neuropathy (HCC)  Lab Results   Component Value Date    HGBA1C 10 9 (H) 11/01/2018       No results for input(s): POCGLU in the last 72 hours  Blood Sugar Average: Last 72 hrs:    Patient has uncontrolled Type 2 DM  He declined Insulin therapy as recommended by endocrinology at the last visit on 10/29/18  Discussed a low carb diet with patient  Continue Glimepiride 4 mg twice daily, Januvia 50 mg daily, Acarbose 100 mg 3 times daily with meals  Continue to monitor blood sugar at home  Follow up with endocrinology in 2/19  Check eye exam by ophthalmologist Dr Rachel Solano annually  Follow- up with podiatrist Dr Adore Denis for routine foot care  Essential hypertension  BP is stable  Continue Irbesartan 300 mg daily  Follow a low sodium diet  Atherosclerosis of native coronary artery of native heart without angina pectoris  Patient is asymptomatic  Continue aspirin 81 mg daily, statin  Follow-up with cardiologist Dr Tino De Guzman  Chronic kidney disease, stage 3  Creatinine level is stable  Will continue to monitor labs  Recommended to avoid NSAID's, nephrotoxic drugs  Encouraged to stay well hydrated  Paroxysmal SVT (supraventricular tachycardia) (HCC)  Patient is status post AV node ablation last year  He reports no episodes of tachycardia or heart palpitations      Mixed hyperlipidemia  LDL is at goal  Continue Simvastatin 40 mg daily  Encouraged regular exercise  Mild cognitive impairment  Family declined geriatric assessment  No behavior disturbances  Recommended to stay mentally, socially and physically active  Will continue to monitor cognitive status  Explained to patient that he should not drive for his safety and safety of others  Family provides transportation  Anxiety disorder  Continue Zoloft 50 mg daily  Ambulatory dysfunction  Discussed fall precautions with patient and his wife  Recommended to use a cane or a walker for ambulation to prevent falls  I have spent 40 minutes with Patient and family today in which greater than 50% of this time was spent in counseling/coordination of care regarding Diagnostic results, Risks and benefits of tx options, Intructions for management, Patient and family education, Importance of tx compliance, Risk factor reductions and Impressions  Schedule follow-up office visit in 6 months  Check labs prior to next visit  Diagnoses and all orders for this visit:    DM type 2 with diabetic peripheral neuropathy ([facility] [address] )  -     Comprehensive metabolic panel; Future  -     Hemoglobin A1C; Future  -     Microalbumin / creatinine urine ratio    Essential hypertension  -     Comprehensive metabolic panel; Future    Atherosclerosis of native coronary artery of native heart without angina pectoris    Chronic kidney disease, stage 3 ([facility])  -     Comprehensive metabolic panel; Future    Paroxysmal SVT (supraventricular tachycardia) ([facility])    Mixed hyperlipidemia  -     Lipid panel; Future    Mild cognitive impairment    Generalized anxiety disorder    Ambulatory dysfunction    Other orders  -     acarbose (PRECOSE) 100 MG tablet; Take 100 mg by mouth 3 (three) times a day with meals          Subjective:      Patient ID: Andrzej Silva is a 80 y o  male      HPI    Patient presents to the office for 6 month follow-up of chronic medical conditions accompanied by his wife who provides most of the medical history  PMHx: Type 2 DM with  diabetic nephropathy, diabetic neuropathy, HTN, CAD, SVT, S/P AV node ablation,   CKD stage 3, Hyperlipidemia, chronic low back pain secondary to DDD, lumbar spondylosis, BPH, OA, Anxiety, mild cognitive impairment, ambulatory dysfunction  Reviewed current medications with patient and his wife,  blood work results from 11/1/18  Fasting blood sugar 253, Hb A1c 10 9, potassium 4 1, creatinine 1 35, cholesterol 132, triglycerides 114,   HDL 44, LDL 65  Type 2 DM - patient established endocrinologist   Dr Alex Diaz with [facility]  He was last seen on 10/29/18 by endocrinology nurse practitioner who  recommended to start on basal Insulin  Patient declined starting on Insulin  Wife requested to change Gipizide to Glimepiride which provided a better control of blood sugar in the past   Patient is back on Glimepiride 4 mg twice daily  He takes Januvia 50 mg daily and Acarbose 100 mg   3 times daily with meals  Blood sugar at home remains elevated in high 200's- 300  Eye exam done by ophthalmologist Dr Mahesh Alberts on 10/23/18  Patient has been followed by podiatrist Dr Angie Suero for routine foot care twice a year  HTN - currently taking Irbesartan 300 mg daily  Patient denies chest pain, shortness of breath, dizziness  Patient has history of SVT,  AV adrianna re-entry tachycardia  He underwent EPS and ablation in April 2017 and in  October 2017  He reports no chest pain, n episodes of tachycardia  CAD - patient takes ASA 81 mg daily, statin  He was seen by cardiologist Dr Jackelyn Mcfarland yesterday  No changes were made to current medical regimen  Patient has mild cognitive impairment  No behavior disturbances  Patient is forgetful, repeats questions multiple times  He feels anxious  Denies feeling depressed    Currently takes Zoloft 50 mg daily for anxiety  Denies side effects  He is no longer taking Alprazolam      Patient does not drive a car, but was asking today if he can resume driving  Hyperlipidemia - on Simvastatin 40 mg daily  No side effects from statin therapy  Wife reports 2  falls last months  Patient fell 3 weeks ago while walking outside and stepping of the curb  He fell at home once after loosing his balance  No injuries  Patient ambulates with a cane  He has a walker at home  Colonoscopy done in December 2014  Colorectal surgeon Dr Ross Sheldon recommended to repeat colonoscopy in 5 years  Patient had Pneumovax done at the pharmacy 3 years ago  Flu shot done in 9/18  The following portions of the patient's history were reviewed and updated as appropriate: allergies, past family history, past medical history, past social history, past surgical history and problem list     Review of Systems   Constitutional: Positive for fatigue (mild)  Negative for activity change, appetite change, chills and fever  HENT: Positive for hearing loss  Negative for congestion, ear pain, nosebleeds, sore throat, tinnitus and trouble swallowing  Eyes: Negative for pain, discharge, redness, itching and visual disturbance  Respiratory: Negative for cough, chest tightness, shortness of breath and wheezing  Cardiovascular: Negative for chest pain, palpitations and leg swelling  Gastrointestinal: Negative for abdominal pain, blood in stool, constipation, diarrhea, nausea and vomiting  Genitourinary: Negative for difficulty urinating, dysuria, flank pain and hematuria  Nocturia   Musculoskeletal: Positive for arthralgias (pain in legs), back pain (chronic mild back pain ) and gait problem (gait instability)  Negative for joint swelling and myalgias  Skin: Negative for rash and wound  Neurological: Positive for numbness (in feet)  Negative for dizziness, syncope and headaches          Forgetful, short memory loss Hematological: Negative for adenopathy  Does not bruise/bleed easily  Psychiatric/Behavioral: Positive for sleep disturbance  Negative for behavioral problems and suicidal ideas  Anxiety  Denies feeling depressed  Objective:      /70 (BP Location: Left arm, Patient Position: Sitting, Cuff Size: Standard)   Pulse 80   Temp 97 7 °F (36 5 °C) (Oral)   Resp 16   Ht 5' 7" (1 702 m)   Wt 77 3 kg (170 lb 6 4 oz)   SpO2 95%   BMI 26 69 kg/m²          Physical Exam   Constitutional: He appears well-developed and well-nourished  HENT:   Head: Normocephalic and atraumatic  Right Ear: External ear normal    Left Ear: External ear normal    Mouth/Throat: Oropharynx is clear and moist    Eyes: Pupils are equal, round, and reactive to light  Conjunctivae are normal    Neck: Normal range of motion  Neck supple  No JVD present  Cardiovascular: Normal rate, regular rhythm and normal heart sounds  No murmur heard  No carotid bruits BL  No BL LE edema   Pulmonary/Chest: Effort normal and breath sounds normal  He has no wheezes  He has no rales  Abdominal: Soft  Bowel sounds are normal  There is no tenderness  Musculoskeletal: Normal range of motion  He exhibits no edema, tenderness or deformity  Patient ambulates with a cane   Skin: Skin is warm and dry  No rash noted  Small healing abrasions on BL knee   Psychiatric: His behavior is normal    Patient feels anxious  Nursing note and vitals reviewed

## 2018-11-08 NOTE — ASSESSMENT & PLAN NOTE
Creatinine level is stable  Will continue to monitor labs  Recommended to avoid NSAID's, nephrotoxic drugs  Encouraged to stay well hydrated

## 2018-11-08 NOTE — ASSESSMENT & PLAN NOTE
Patient is status post AV node ablation last year  He reports no episodes of tachycardia or heart palpitations

## 2018-11-08 NOTE — ASSESSMENT & PLAN NOTE
Patient is asymptomatic  Continue aspirin 81 mg daily, statin  Follow-up with cardiologist Dr Chino Silence

## 2018-11-08 NOTE — ASSESSMENT & PLAN NOTE
Family declined geriatric assessment  No behavior disturbances  Recommended to stay mentally, socially and physically active  Will continue to monitor cognitive status  Explained to patient that he should not drive for his safety and safety of others  Family provides transportation

## 2018-11-08 NOTE — ASSESSMENT & PLAN NOTE
Lab Results   Component Value Date    HGBA1C 10 9 (H) 11/01/2018       No results for input(s): POCGLU in the last 72 hours  Blood Sugar Average: Last 72 hrs:    Patient has uncontrolled Type 2 DM  He declined Insulin therapy as recommended by endocrinology at the last visit on 10/29/18  Discussed a low carb diet with patient  Continue Glimepiride 4 mg twice daily, Januvia 50 mg daily, Acarbose 100 mg 3 times daily with meals  Continue to monitor blood sugar at home  Follow up with endocrinology in 2/19  Check eye exam by ophthalmologist Dr Teressa Clarke annually  Follow- up with podiatrist Dr Ekaterina Mccarty for routine foot care

## 2019-04-30 ENCOUNTER — APPOINTMENT (OUTPATIENT)
Dept: LAB | Facility: MEDICAL CENTER | Age: 84
End: 2019-04-30
Payer: COMMERCIAL

## 2019-04-30 DIAGNOSIS — E78.2 MIXED HYPERLIPIDEMIA: ICD-10-CM

## 2019-04-30 DIAGNOSIS — I10 ESSENTIAL HYPERTENSION: ICD-10-CM

## 2019-04-30 DIAGNOSIS — N18.30 CHRONIC KIDNEY DISEASE, STAGE 3 (HCC): ICD-10-CM

## 2019-04-30 DIAGNOSIS — E11.42 DM TYPE 2 WITH DIABETIC PERIPHERAL NEUROPATHY (HCC): ICD-10-CM

## 2019-04-30 LAB
ALBUMIN SERPL BCP-MCNC: 3.4 G/DL (ref 3.5–5)
ALP SERPL-CCNC: 95 U/L (ref 46–116)
ALT SERPL W P-5'-P-CCNC: 40 U/L (ref 12–78)
ANION GAP SERPL CALCULATED.3IONS-SCNC: 6 MMOL/L (ref 4–13)
AST SERPL W P-5'-P-CCNC: 31 U/L (ref 5–45)
BILIRUB SERPL-MCNC: 0.79 MG/DL (ref 0.2–1)
BUN SERPL-MCNC: 27 MG/DL (ref 5–25)
CALCIUM SERPL-MCNC: 8.9 MG/DL (ref 8.3–10.1)
CHLORIDE SERPL-SCNC: 104 MMOL/L (ref 100–108)
CHOLEST SERPL-MCNC: 133 MG/DL (ref 50–200)
CO2 SERPL-SCNC: 28 MMOL/L (ref 21–32)
CREAT SERPL-MCNC: 1.46 MG/DL (ref 0.6–1.3)
CREAT UR-MCNC: 110 MG/DL
EST. AVERAGE GLUCOSE BLD GHB EST-MCNC: 249 MG/DL
GFR SERPL CREATININE-BSD FRML MDRD: 44 ML/MIN/1.73SQ M
GLUCOSE P FAST SERPL-MCNC: 263 MG/DL (ref 65–99)
HBA1C MFR BLD: 10.3 % (ref 4.2–6.3)
HDLC SERPL-MCNC: 51 MG/DL (ref 40–60)
LDLC SERPL CALC-MCNC: 64 MG/DL (ref 0–100)
MICROALBUMIN UR-MCNC: 146 MG/L (ref 0–20)
MICROALBUMIN/CREAT 24H UR: 133 MG/G CREATININE (ref 0–30)
NONHDLC SERPL-MCNC: 82 MG/DL
POTASSIUM SERPL-SCNC: 4.7 MMOL/L (ref 3.5–5.3)
PROT SERPL-MCNC: 7.5 G/DL (ref 6.4–8.2)
SODIUM SERPL-SCNC: 138 MMOL/L (ref 136–145)
TRIGL SERPL-MCNC: 92 MG/DL

## 2019-04-30 PROCEDURE — 83036 HEMOGLOBIN GLYCOSYLATED A1C: CPT

## 2019-04-30 PROCEDURE — 80053 COMPREHEN METABOLIC PANEL: CPT

## 2019-04-30 PROCEDURE — 36415 COLL VENOUS BLD VENIPUNCTURE: CPT

## 2019-04-30 PROCEDURE — 80061 LIPID PANEL: CPT

## 2019-04-30 PROCEDURE — 82570 ASSAY OF URINE CREATININE: CPT | Performed by: FAMILY MEDICINE

## 2019-04-30 PROCEDURE — 82043 UR ALBUMIN QUANTITATIVE: CPT | Performed by: FAMILY MEDICINE

## 2019-05-06 RX ORDER — LOSARTAN POTASSIUM 100 MG/1
100 TABLET ORAL DAILY
COMMUNITY
Start: 2019-02-06 | End: 2021-06-02

## 2019-05-06 RX ORDER — KETOCONAZOLE 20 MG/G
CREAM TOPICAL
Refills: 1 | COMMUNITY
Start: 2019-03-12 | End: 2021-06-09 | Stop reason: HOSPADM

## 2019-05-08 ENCOUNTER — OFFICE VISIT (OUTPATIENT)
Dept: FAMILY MEDICINE CLINIC | Facility: CLINIC | Age: 84
End: 2019-05-08
Payer: COMMERCIAL

## 2019-05-08 VITALS
BODY MASS INDEX: 27.94 KG/M2 | HEART RATE: 82 BPM | TEMPERATURE: 97.3 F | WEIGHT: 178 LBS | RESPIRATION RATE: 20 BRPM | OXYGEN SATURATION: 96 % | HEIGHT: 67 IN | DIASTOLIC BLOOD PRESSURE: 82 MMHG | SYSTOLIC BLOOD PRESSURE: 136 MMHG

## 2019-05-08 DIAGNOSIS — I25.10 ATHEROSCLEROSIS OF NATIVE CORONARY ARTERY OF NATIVE HEART WITHOUT ANGINA PECTORIS: ICD-10-CM

## 2019-05-08 DIAGNOSIS — F41.1 GENERALIZED ANXIETY DISORDER: ICD-10-CM

## 2019-05-08 DIAGNOSIS — E78.2 MIXED HYPERLIPIDEMIA: ICD-10-CM

## 2019-05-08 DIAGNOSIS — H61.23 BILATERAL IMPACTED CERUMEN: ICD-10-CM

## 2019-05-08 DIAGNOSIS — I10 ESSENTIAL HYPERTENSION: Primary | ICD-10-CM

## 2019-05-08 DIAGNOSIS — E11.21 DIABETIC NEPHROPATHY ASSOCIATED WITH TYPE 2 DIABETES MELLITUS (HCC): ICD-10-CM

## 2019-05-08 DIAGNOSIS — R26.2 AMBULATORY DYSFUNCTION: ICD-10-CM

## 2019-05-08 DIAGNOSIS — E11.42 DM TYPE 2 WITH DIABETIC PERIPHERAL NEUROPATHY (HCC): ICD-10-CM

## 2019-05-08 DIAGNOSIS — Z00.00 MEDICARE ANNUAL WELLNESS VISIT, SUBSEQUENT: ICD-10-CM

## 2019-05-08 DIAGNOSIS — N18.30 CHRONIC KIDNEY DISEASE, STAGE 3 (HCC): ICD-10-CM

## 2019-05-08 DIAGNOSIS — G31.84 MILD COGNITIVE IMPAIRMENT: ICD-10-CM

## 2019-05-08 PROCEDURE — 3079F DIAST BP 80-89 MM HG: CPT | Performed by: FAMILY MEDICINE

## 2019-05-08 PROCEDURE — 1101F PT FALLS ASSESS-DOCD LE1/YR: CPT | Performed by: FAMILY MEDICINE

## 2019-05-08 PROCEDURE — 3075F SYST BP GE 130 - 139MM HG: CPT | Performed by: FAMILY MEDICINE

## 2019-05-08 PROCEDURE — 1036F TOBACCO NON-USER: CPT | Performed by: FAMILY MEDICINE

## 2019-05-08 PROCEDURE — 1125F AMNT PAIN NOTED PAIN PRSNT: CPT | Performed by: FAMILY MEDICINE

## 2019-05-08 PROCEDURE — 1160F RVW MEDS BY RX/DR IN RCRD: CPT | Performed by: FAMILY MEDICINE

## 2019-05-08 PROCEDURE — 99215 OFFICE O/P EST HI 40 MIN: CPT | Performed by: FAMILY MEDICINE

## 2019-05-08 PROCEDURE — G0439 PPPS, SUBSEQ VISIT: HCPCS | Performed by: FAMILY MEDICINE

## 2019-05-08 PROCEDURE — 1170F FXNL STATUS ASSESSED: CPT | Performed by: FAMILY MEDICINE

## 2019-05-18 DIAGNOSIS — E78.5 HYPERLIPIDEMIA, UNSPECIFIED HYPERLIPIDEMIA TYPE: ICD-10-CM

## 2019-05-18 DIAGNOSIS — E11.65 TYPE 2 DIABETES MELLITUS WITH HYPERGLYCEMIA, WITHOUT LONG-TERM CURRENT USE OF INSULIN (HCC): ICD-10-CM

## 2019-05-18 RX ORDER — SIMVASTATIN 40 MG
TABLET ORAL
Qty: 90 TABLET | Refills: 3 | Status: SHIPPED | OUTPATIENT
Start: 2019-05-18 | End: 2020-03-09

## 2019-05-18 RX ORDER — GLIMEPIRIDE 4 MG/1
TABLET ORAL
Qty: 180 TABLET | Refills: 3 | Status: SHIPPED | OUTPATIENT
Start: 2019-05-18 | End: 2020-03-09

## 2019-08-14 ENCOUNTER — APPOINTMENT (EMERGENCY)
Dept: CT IMAGING | Facility: HOSPITAL | Age: 84
End: 2019-08-14
Payer: COMMERCIAL

## 2019-08-14 ENCOUNTER — APPOINTMENT (EMERGENCY)
Dept: RADIOLOGY | Facility: HOSPITAL | Age: 84
End: 2019-08-14
Payer: COMMERCIAL

## 2019-08-14 ENCOUNTER — HOSPITAL ENCOUNTER (EMERGENCY)
Facility: HOSPITAL | Age: 84
Discharge: HOME | End: 2019-08-14
Attending: EMERGENCY MEDICINE | Admitting: EMERGENCY MEDICINE
Payer: COMMERCIAL

## 2019-08-14 VITALS
TEMPERATURE: 98.6 F | RESPIRATION RATE: 18 BRPM | DIASTOLIC BLOOD PRESSURE: 82 MMHG | WEIGHT: 188.05 LBS | BODY MASS INDEX: 29.45 KG/M2 | SYSTOLIC BLOOD PRESSURE: 184 MMHG | HEART RATE: 72 BPM | OXYGEN SATURATION: 99 %

## 2019-08-14 DIAGNOSIS — R20.0 LEFT SIDED NUMBNESS: ICD-10-CM
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
## 2019-08-14 LAB
ALBUMIN SERPL BCP-MCNC: 3.7 G/DL (ref 3.5–5)
ALP SERPL-CCNC: 88 U/L (ref 46–116)
ALT SERPL W P-5'-P-CCNC: 40 U/L (ref 12–78)
ANION GAP SERPL CALCULATED.3IONS-SCNC: 10 MMOL/L (ref 4–13)
AST SERPL W P-5'-P-CCNC: 24 U/L (ref 5–45)
ATRIAL RATE: 75 BPM
BASOPHILS # BLD AUTO: 0.04 THOUSANDS/ΜL (ref 0–0.1)
BASOPHILS NFR BLD AUTO: 0 % (ref 0–1)
BILIRUB SERPL-MCNC: 0.49 MG/DL (ref 0.2–1)
BUN SERPL-MCNC: 22 MG/DL (ref 5–25)
CALCIUM SERPL-MCNC: 9.8 MG/DL (ref 8.3–10.1)
CHLORIDE SERPL-SCNC: 102 MMOL/L (ref 100–108)
CO2 SERPL-SCNC: 28 MMOL/L (ref 21–32)
CREAT SERPL-MCNC: 1.49 MG/DL (ref 0.6–1.3)
EOSINOPHIL # BLD AUTO: 0.17 THOUSAND/ΜL (ref 0–0.61)
EOSINOPHIL NFR BLD AUTO: 2 % (ref 0–6)
ERYTHROCYTE [DISTWIDTH] IN BLOOD BY AUTOMATED COUNT: 12.1 % (ref 11.6–15.1)
GFR SERPL CREATININE-BSD FRML MDRD: 43 ML/MIN/1.73SQ M
GLUCOSE SERPL-MCNC: 275 MG/DL (ref 65–140)
HCT VFR BLD AUTO: 49.2 % (ref 36.5–49.3)
HGB BLD-MCNC: 16.7 G/DL (ref 12–17)
IMM GRANULOCYTES # BLD AUTO: 0.05 THOUSAND/UL (ref 0–0.2)
IMM GRANULOCYTES NFR BLD AUTO: 1 % (ref 0–2)
LYMPHOCYTES # BLD AUTO: 1.23 THOUSANDS/ΜL (ref 0.6–4.47)
LYMPHOCYTES NFR BLD AUTO: 12 % (ref 14–44)
MCH RBC QN AUTO: 31.1 PG (ref 26.8–34.3)
MCHC RBC AUTO-ENTMCNC: 33.9 G/DL (ref 31.4–37.4)
MCV RBC AUTO: 92 FL (ref 82–98)
MONOCYTES # BLD AUTO: 0.74 THOUSAND/ΜL (ref 0.17–1.22)
MONOCYTES NFR BLD AUTO: 7 % (ref 4–12)
NEUTROPHILS # BLD AUTO: 7.76 THOUSANDS/ΜL (ref 1.85–7.62)
NEUTS SEG NFR BLD AUTO: 78 % (ref 43–75)
NRBC BLD AUTO-RTO: 0 /100 WBCS
P AXIS: 44 DEGREES
PLATELET # BLD AUTO: 209 THOUSANDS/UL (ref 149–390)
PMV BLD AUTO: 9.5 FL (ref 8.9–12.7)
POTASSIUM SERPL-SCNC: 4.1 MMOL/L (ref 3.5–5.3)
PR INTERVAL: 258 MS
PROT SERPL-MCNC: 8 G/DL (ref 6.4–8.2)
QRS AXIS: -37 DEGREES
QRSD INTERVAL: 100 MS
QT INTERVAL: 370 MS
QTC INTERVAL: 413 MS
RBC # BLD AUTO: 5.37 MILLION/UL (ref 3.88–5.62)
SODIUM SERPL-SCNC: 140 MMOL/L (ref 136–145)
T WAVE AXIS: 41 DEGREES
TSH SERPL DL<=0.05 MIU/L-ACNC: 1.41 UIU/ML (ref 0.36–3.74)
VENTRICULAR RATE: 75 BPM
WBC # BLD AUTO: 9.99 THOUSAND/UL (ref 4.31–10.16)

## 2019-08-14 PROCEDURE — 36415 COLL VENOUS BLD VENIPUNCTURE: CPT | Performed by: EMERGENCY MEDICINE

## 2019-08-14 PROCEDURE — 84443 ASSAY THYROID STIM HORMONE: CPT | Performed by: EMERGENCY MEDICINE

## 2019-08-14 PROCEDURE — 71045 X-RAY EXAM CHEST 1 VIEW: CPT

## 2019-08-14 PROCEDURE — 80053 COMPREHEN METABOLIC PANEL: CPT | Performed by: EMERGENCY MEDICINE

## 2019-08-14 PROCEDURE — 99285 EMERGENCY DEPT VISIT HI MDM: CPT | Performed by: EMERGENCY MEDICINE

## 2019-08-14 PROCEDURE — 73564 X-RAY EXAM KNEE 4 OR MORE: CPT

## 2019-08-14 PROCEDURE — 96361 HYDRATE IV INFUSION ADD-ON: CPT

## 2019-08-14 PROCEDURE — 96360 HYDRATION IV INFUSION INIT: CPT

## 2019-08-14 PROCEDURE — 72125 CT NECK SPINE W/O DYE: CPT

## 2019-08-14 PROCEDURE — 93005 ELECTROCARDIOGRAM TRACING: CPT

## 2019-08-14 PROCEDURE — 93010 ELECTROCARDIOGRAM REPORT: CPT | Performed by: INTERNAL MEDICINE

## 2019-08-14 PROCEDURE — 70450 CT HEAD/BRAIN W/O DYE: CPT

## 2019-08-14 PROCEDURE — 99285 EMERGENCY DEPT VISIT HI MDM: CPT

## 2019-08-14 PROCEDURE — 85025 COMPLETE CBC W/AUTO DIFF WBC: CPT | Performed by: EMERGENCY MEDICINE

## 2019-08-14 RX ADMIN — SODIUM CHLORIDE 500 ML: 0.9 INJECTION, SOLUTION INTRAVENOUS at 12:52

## 2019-08-14 NOTE — ED NOTES
2 attempts made to establish IV access, flash obtained x2, line infiltrated        Bhavesh Ingram, PRIMO  08/14/19 1787

## 2019-08-14 NOTE — ED PROVIDER NOTES
ASSESSMENT AND PLAN    Gregory Hunter is a 80 y o  male  who presents for evaluation of generalized weakness, left-sided facial numbness, left-sided upper and lower extremity numbness x3 days, as well as a recent fall while outside in the heat  On arrival, the patient is hemodynamically stable and well-appearing without acute distress, with a nontoxic appearance  His physical exam is unremarkable, is neurologic exam is documented below  He does not appear to have any deficits  His fall from 3 days ago appears to be near syncopal, possibly related to heat exposure, however he did not lose conscious or strike his head  -500 cc bolus IV fluid  -lab work unremarkable  -CT head, C-spine unremarkable  -plain films of the chest, and left knee unremarkable, negative for acute injury  -the patient ambulated without difficulty with a walker, which has been his baseline  -will discharge home  Strict return precautions provided  Recommended outpatient follow up with his primary care physician for further issues  History  Chief Complaint   Patient presents with    Numbness     Pt  had a recent fall on the August 10th  Pt  reports since the fall he has had left sided arm numbness and left sided facial numbness  This is an 35-year-old male with history of CAD status post CABG in 1999, chronic aspirin use, type 2 diabetes, hypertension, hyperlipidemia  Per the patient's wife, he also has a history of minor dementia  The patient states that on Saturday, 8/10, he had a fall  He states that he was at dinner, and when he went outside, it was very hot out, and he suddenly felt generally weak because of this  When he got to the car, he attempted open the car door, and slid down the side of the car door, leading to an abrasion to the left knee  He did not strike his head or lose conscious, and his children helped him to the ground before he fell    He was able to get up, and did not have any further difficulty that day   He states that since then, he has had diffuse neck pain  He also endorses numbness the left side of face, left arm, and left leg both diffusely, which has progressively worsening  He also endorses right leg numbness, but states the left leg is worse  He is unable to characterize his sensation of symptoms further  The patient denies any unilateral extremity weakness  He denies any fevers, chills, headache, lightheadedness, visual changes, nausea, vomiting, diarrhea, abdominal pain, chest pain, or shortness of breath  He states that aside from an abrasion to the left knee, he sustained no other known injuries during the fall per the patient's wife, the wife has been forcing the patient to walk with a walker, however the patient is apprehensive, as he has not had any further falls while using a cane  He says he uses a cane at baseline  The patient's wife specifically states that she is concerned that his recent initiation of insulin has been causing him to have this numbness  Prior to Admission Medications   Prescriptions Last Dose Informant Patient Reported? Taking? JANUVIA 50 MG tablet  Self No Yes   Sig: TAKE 1 TABLET DAILY   Multiple Vitamin (MULTIVITAMIN) tablet  Self Yes Yes   Sig: Take 1 tablet by mouth daily   Omega-3 Fatty Acids (FISH OIL PO)  Self Yes Yes   Sig: Take 1 tablet by mouth 2 (two) times a day   acarbose (PRECOSE) 100 MG tablet  Self Yes Yes   Sig: Take 100 mg by mouth 3 (three) times a day with meals   acetaminophen (TYLENOL) 325 mg tablet  Self No No   Simg by mouth every 6 hours for pain  Disp #90 Refills: 1   aspirin 81 MG tablet  Self Yes Yes   Sig: Take 81 mg by mouth daily     cholecalciferol (VITAMIN D3) 1,000 units tablet  Self Yes Yes   Sig: Take 2,000 Units by mouth 2 (two) times a day     glimepiride (AMARYL) 4 mg tablet   No Yes   Sig: TAKE 1 TABLET TWICE A DAY   ketoconazole (NIZORAL) 2 % cream  Self Yes No   Sig: APPLY TO AFFECTED AREA TWICE A DAY ON BOTH FEET BOTTOM AND TOES   losartan (COZAAR) 100 MG tablet  Self Yes Yes   Sig: Take 100 mg by mouth daily   melatonin 3 mg  Self Yes Yes   Sig: Take 3 mg by mouth daily at bedtime   sertraline (ZOLOFT) 50 mg tablet  Self Yes Yes   Sig: Take 50 mg by mouth daily   simvastatin (ZOCOR) 40 mg tablet   No Yes   Sig: TAKE 1 TABLET DAILY AT     BEDTIME      Facility-Administered Medications: None       Past Medical History:   Diagnosis Date    Anal fissure     Anxiety     Cervical spinal stenosis     CKD (chronic kidney disease) stage 3, GFR 30-59 ml/min ([facility])     Coronary artery disease     Degenerative joint disease of ankle and foot, unspecified laterality     Disc degeneration, lumbar     Diverticulosis     Erectile dysfunction of non-organic origin     Hematuria     last assessed 8/28/12    Hyperlipidemia     Hypertension     IBS (irritable bowel syndrome)     Lumbar radiculopathy     Multiple benign polyps of large intestine     benign, last assessed 6/26/12    Osteoarthritis     hip    PSVT (paroxysmal supraventricular tachycardia) ([facility] [address] ) 02/2013    S[facility]G x [facility]    Spondylolisthesis of lumbar region     Type 2 diabetes mellitus with diabetic neuropathy ([facility])        Past Surgical History:   Procedure Laterality Date    CATARACT EXTRACTION Bilateral     COLONOSCOPY      COLONOSCOPY  12/2014    CORONARY ARTERY BYPASS GRAFT      1999 cabgx4    ESOPHAGOGASTRODUODENOSCOPY      [place] TOTAL HIP ARTHROPLASTY Left 2/3/2016    Procedure: ARTHROPLASTY HIP TOTAL;  Surgeon: Sandra Daley MD;  Location: [facility];  Service: Orthopedics    TONSILLECTOMY      UMBILICAL HERNIA REPAIR      WRIST SURGERY Left        Family History   Problem Relation Age of Onset    Heart disease Mother     Dementia Mother     Heart disease Father     Heart attack Father         acute MI    Hypertension Other      I have reviewed and agree with the history as documented      Social History Tobacco Use    Smoking status: Former Smoker    Smokeless tobacco: Never Used   Substance Use Topics    Alcohol use: No    Drug use: No        Review of Systems   Constitutional: Negative for chills and fever  HENT: Negative for congestion and sinus pain  Eyes: Negative for photophobia and visual disturbance  Respiratory: Negative for cough and shortness of breath  Cardiovascular: Negative for chest pain and palpitations  Gastrointestinal: Negative for abdominal pain, diarrhea, nausea and vomiting  Genitourinary: Negative for dysuria and hematuria  Musculoskeletal: Negative for neck pain and neck stiffness  Skin: Negative for pallor and rash  Neurological: Positive for numbness  Negative for tremors, syncope, light-headedness and headaches  Physical Exam  ED Triage Vitals [08/14/19 1215]   Temperature Pulse Respirations Blood Pressure SpO2   98 6 °F (37 °C) 78 18 170/80 98 %      Temp Source Heart Rate Source Patient Position - Orthostatic VS BP Location FiO2 (%)   Oral Monitor Lying Right arm --      Pain Score       No Pain             Orthostatic Vital Signs  Vitals:    08/14/19 1215 08/14/19 1326 08/14/19 1427 08/14/19 1545   BP: 170/80 (!) 188/92 160/76 (!) 184/82   Pulse: 78 66 67 72   Patient Position - Orthostatic VS: Lying Sitting Sitting Sitting       Physical Exam   Constitutional: He is oriented to person, place, and time  Awake and alert, well appearing, no acute distress  Nontoxic in appearance  Mental status appropriate  HENT:   Head: Normocephalic  Mouth/Throat: Oropharynx is clear and moist  No oropharyngeal exudate  Eyes: Pupils are equal, round, and reactive to light  EOM are normal  No scleral icterus  Neck: Normal range of motion  No JVD present  No C-spine tenderness to palpation  No step-off, deformity, or skin changes   Cardiovascular: Normal rate, regular rhythm and normal heart sounds  No murmur heard    Pulmonary/Chest: Effort normal  No stridor  No respiratory distress  He has no wheezes  He has no rales  Abdominal: Soft  He exhibits no distension  There is no tenderness  Musculoskeletal: Normal range of motion  He exhibits no edema, tenderness or deformity  There is an abrasion to the anterior aspect of the left knee   Neurological: He is alert and oriented to person, place, and time  No cranial nerve deficit  He exhibits normal muscle tone  Strength is 5/5 and equal in all extremities bilaterally  Sensation grossly intact and equal in all extremities  Intact sharp/dull discrimination of the bilateral upper and lower extremities in all dermatomes  Two point discrimination mildly decreased in the bilateral lower extremities, but equal   No cranial nerve deficits appreciated  Skin: Skin is warm and dry  No pallor  ED Medications  Medications   sodium chloride 0 9 % bolus 500 mL (0 mL Intravenous Stopped 8/14/19 1427)       Diagnostic Studies  Results Reviewed     Procedure Component Value Units Date/Time    TSH, 3rd generation with Free T4 reflex [[id number]]  (Normal) Collected:  08/14/19 1243    Lab Status:  Final result Specimen:  Blood from Arm, Left Updated:  08/14/19 1324     TSH 3RD GENERATON 1 410 uIU/mL     Narrative:       Patients undergoing fluorescein dye angiography may retain small amounts of fluorescein in the body for 48-72 hours post procedure  Samples containing fluorescein can produce falsely depressed TSH values  If the patient had this procedure,a specimen should be resubmitted post fluorescein clearance        Comprehensive metabolic panel [[id number]]  (Abnormal) Collected:  08/14/19 1243    Lab Status:  Final result Specimen:  Blood from Arm, Left Updated:  08/14/19 1306     Sodium 140 mmol/L      Potassium 4 1 mmol/L      Chloride 102 mmol/L      CO2 28 mmol/L      ANION GAP 10 mmol/L      BUN 22 mg/dL      Creatinine 1 49 mg/dL      Glucose 275 mg/dL      Calcium 9 8 mg/dL      AST 24 U/L      ALT 40 U/L Alkaline Phosphatase 88 U/L      Total Protein 8 0 g/dL      Albumin 3 7 g/dL      Total Bilirubin 0 49 mg/dL      eGFR 43 ml/min/1 73sq m     Narrative:       [place] guidelines for Chronic Kidney Disease (CKD):     Stage 1 with normal or high GFR (GFR > 90 mL/min/1 73 square meters)    Stage 2 Mild CKD (GFR = 60-89 mL/min/1 73 square meters)    Stage 3A Moderate CKD (GFR = 45-59 mL/min/1 73 square meters)    Stage 3B Moderate CKD (GFR = 30-44 mL/min/1 73 square meters)    Stage 4 Severe CKD (GFR = 15-29 mL/min/1 73 square meters)    Stage 5 End Stage CKD (GFR <15 mL/min/1 73 square meters)  Note: GFR calculation is accurate only with a steady state creatinine    CBC and differential [[id number]]  (Abnormal) Collected:  08/14/19 1243    Lab Status:  Final result Specimen:  Blood from Arm, Left Updated:  08/14/19 1248     WBC 9 99 Thousand/uL      RBC 5 37 Million/uL      Hemoglobin 16 7 g/dL      Hematocrit 49 2 %      MCV 92 fL      MCH 31 1 pg      MCHC 33 9 g/dL      RDW 12 1 %      MPV 9 5 fL      Platelets 824 Thousands/uL      nRBC 0 /100 WBCs      Neutrophils Relative 78 %      Immat GRANS % 1 %      Lymphocytes Relative 12 %      Monocytes Relative 7 %      Eosinophils Relative 2 %      Basophils Relative 0 %      Neutrophils Absolute 7 76 Thousands/µL      Immature Grans Absolute 0 05 Thousand/uL      Lymphocytes Absolute 1 23 Thousands/µL      Monocytes Absolute 0 74 Thousand/µL      Eosinophils Absolute 0 17 Thousand/µL      Basophils Absolute 0 04 Thousands/µL                  CT head without contrast   Final Result by Carlos Salinas MD (08/14 5822)      No acute intracranial abnormality  Microangiopathic changes  Workstation performed: [id number]         CT spine cervical without contrast   Final Result by Carlos Salinas MD (08/14 6866)      Moderate spondylosis   No cervical spine fracture or traumatic malalignment                     Workstation performed: [id number]         XR knee 4+ vw left injury   ED Interpretation by Monet Hernandez MD (08/14 1336)   No fracture  Calcified vessels      Final Result by Kodi Aragon DO (08/14 2025)   Mild tricompartmental osteoarthritic changes as described  No acute fracture is seen  Workstation performed: [id number]         XR chest 1 view portable   ED Interpretation by Monet Hernandez MD (08/14 1336)   Poor inspiratory film  No cardiopulmonary disease appreciated      Final Result by Mohamud Maldonado MD (08/14 2025)      No acute cardiopulmonary disease  Workstation performed: [id number]               Procedures  Procedures        ED Course                               MDM    Disposition  Final diagnoses:   Fall, initial encounter   Left sided numbness     Time reflects when diagnosis was documented in both MDM as applicable and the Disposition within this note     Time User Action Codes Description Comment    8/14/2019  3:38 PM Malia Tucker Add Etienne Turner  IBRB] Fall, initial encounter     8/14/2019  3:38 PM Malia Wallace [R20 0] Left sided numbness       ED Disposition     ED Disposition Condition Date/Time Comment    Discharge Stable Wed Aug 14, 2019  3:38 PM [facility] discharge to home/self care  Follow-up Information     Follow up With Specialties Details Why Contact Info    oJhn Xiong MD Family Medicine Call  To schedule appointment for re-evaluation [address] [facility]  [phone]      John Xiong, [address] [facility]  [phone]            Discharge Medication List as of 8/14/2019  3:55 PM      CONTINUE these medications which have NOT CHANGED    Details   acarbose (PRECOSE) 100 MG tablet Take 100 mg by mouth 3 (three) times a day with meals, Historical Med      aspirin 81 MG tablet Take 81 mg by mouth daily  , Historical Med      cholecalciferol (VITAMIN D3) 1,000 units tablet Take 2,000 Units by mouth 2 (two) times a day  , Historical Med      glimepiride (AMARYL) 4 mg tablet TAKE 1 TABLET TWICE A DAY, Normal      JANUVIA 50 MG tablet TAKE 1 TABLET DAILY, Normal      losartan (COZAAR) 100 MG tablet Take 100 mg by mouth daily, Starting Wed 2/6/2019, Until Thu 2/6/2020, Historical Med      melatonin 3 mg Take 3 mg by mouth daily at bedtime, Historical Med      Multiple Vitamin (MULTIVITAMIN) tablet Take 1 tablet by mouth daily, Historical Med      Omega-3 Fatty Acids (FISH OIL PO) Take 1 tablet by mouth 2 (two) times a day, Historical Med      sertraline (ZOLOFT) 50 mg tablet Take 50 mg by mouth daily, Starting Mon 8/27/2018, Historical Med      simvastatin (ZOCOR) 40 mg tablet TAKE 1 TABLET DAILY AT     BEDTIME, Normal      acetaminophen (TYLENOL) 325 mg tablet 650mg by mouth every 6 hours for pain  Disp #90 Refills: 1, Print      ketoconazole (NIZORAL) 2 % cream APPLY TO AFFECTED AREA TWICE A DAY ON BOTH FEET BOTTOM AND TOES, Historical Med           No discharge procedures on file  ED Provider  Attending physically available and evaluated Reema James I managed the patient along with the ED Attending      Electronically Signed by         Tita Franco MD  08/15/19 2581

## 2019-08-14 NOTE — ED NOTES
Pt ambulated utilizing a cane for support  Pt had a steady gait and is at baseline per patient and family  Pt denies dizziness after ambulation       Sasha Leigh  08/14/19 1153

## 2019-08-14 NOTE — ED ATTENDING ATTESTATION
Fitz Woodward MD, saw and evaluated the patient  I have discussed the patient with the resident/non-physician practitioner and agree with the resident's/non-physician practitioner's findings, Plan of Care, and MDM as documented in the resident's/non-physician practitioner's note, except where noted  All available labs and Radiology studies were reviewed  I was present for key portions of any procedure(s) performed by the resident/non-physician practitioner and I was immediately available to provide assistance  At this point I agree with the current assessment done in the Emergency Department  I have conducted an independent evaluation of this patient a history and physical is as follows:  79 yo male accompanied by his wife to the ED, S/P fall 3 days ago, with some lightheadness, after walking back to car from a food establishment, witnessed by family and prevented from hard to ground by family, no LOC  C/o onset of some neck pain and sensation of LUE subjective paresthesia/numbness, without focal neuro deficit on exam   VS notable for elevated blood pressure  Agree with workup for syncope, albeit 3 days removed from the spell, and imaging for traumatic causes, less likely neurogenic and no acuity within 3 hours so that he is not a candidate for TPA  Cord syndrome is considered but he didn't actually have an impact c/w injury mechanism, nor any focal findings  Reassess ambulatory status based on findings          Critical Care Time  Procedures

## 2019-10-10 ENCOUNTER — APPOINTMENT (OUTPATIENT)
Dept: LAB | Facility: MEDICAL CENTER | Age: 84
End: 2019-10-10
Payer: COMMERCIAL

## 2019-10-10 ENCOUNTER — TRANSCRIBE ORDERS (OUTPATIENT)
Dept: ADMINISTRATIVE | Facility: HOSPITAL | Age: 84
End: 2019-10-10

## 2019-10-10 DIAGNOSIS — I10 ESSENTIAL HYPERTENSION: ICD-10-CM

## 2019-10-10 DIAGNOSIS — N18.30 CHRONIC KIDNEY DISEASE, STAGE 3 (HCC): ICD-10-CM

## 2019-10-10 DIAGNOSIS — E78.2 MIXED HYPERLIPIDEMIA: ICD-10-CM

## 2019-10-10 DIAGNOSIS — IMO0002: ICD-10-CM

## 2019-10-10 DIAGNOSIS — E11.42 DM TYPE 2 WITH DIABETIC PERIPHERAL NEUROPATHY (HCC): ICD-10-CM

## 2019-10-10 DIAGNOSIS — G31.84 MILD COGNITIVE IMPAIRMENT: ICD-10-CM

## 2019-10-10 DIAGNOSIS — IMO0002: Primary | ICD-10-CM

## 2019-10-10 LAB
ALBUMIN SERPL BCP-MCNC: 3.8 G/DL (ref 3.5–5)
ALP SERPL-CCNC: 93 U/L (ref 46–116)
ALT SERPL W P-5'-P-CCNC: 37 U/L (ref 12–78)
ANION GAP SERPL CALCULATED.3IONS-SCNC: 7 MMOL/L (ref 4–13)
AST SERPL W P-5'-P-CCNC: 22 U/L (ref 5–45)
BASOPHILS # BLD AUTO: 0.06 THOUSANDS/ΜL (ref 0–0.1)
BASOPHILS NFR BLD AUTO: 1 % (ref 0–1)
BILIRUB SERPL-MCNC: 0.68 MG/DL (ref 0.2–1)
BUN SERPL-MCNC: 25 MG/DL (ref 5–25)
CALCIUM SERPL-MCNC: 9.6 MG/DL (ref 8.3–10.1)
CHLORIDE SERPL-SCNC: 106 MMOL/L (ref 100–108)
CHOLEST SERPL-MCNC: 120 MG/DL (ref 50–200)
CO2 SERPL-SCNC: 26 MMOL/L (ref 21–32)
CREAT SERPL-MCNC: 1.42 MG/DL (ref 0.6–1.3)
EOSINOPHIL # BLD AUTO: 0.24 THOUSAND/ΜL (ref 0–0.61)
EOSINOPHIL NFR BLD AUTO: 3 % (ref 0–6)
ERYTHROCYTE [DISTWIDTH] IN BLOOD BY AUTOMATED COUNT: 13.1 % (ref 11.6–15.1)
EST. AVERAGE GLUCOSE BLD GHB EST-MCNC: 171 MG/DL
GFR SERPL CREATININE-BSD FRML MDRD: 45 ML/MIN/1.73SQ M
GLUCOSE P FAST SERPL-MCNC: 169 MG/DL (ref 65–99)
HBA1C MFR BLD: 7.6 % (ref 4.2–6.3)
HCT VFR BLD AUTO: 46 % (ref 36.5–49.3)
HDLC SERPL-MCNC: 42 MG/DL (ref 40–60)
HGB BLD-MCNC: 15.4 G/DL (ref 12–17)
IMM GRANULOCYTES # BLD AUTO: 0.04 THOUSAND/UL (ref 0–0.2)
IMM GRANULOCYTES NFR BLD AUTO: 1 % (ref 0–2)
LDLC SERPL CALC-MCNC: 64 MG/DL (ref 0–100)
LYMPHOCYTES # BLD AUTO: 1.04 THOUSANDS/ΜL (ref 0.6–4.47)
LYMPHOCYTES NFR BLD AUTO: 13 % (ref 14–44)
MCH RBC QN AUTO: 31.2 PG (ref 26.8–34.3)
MCHC RBC AUTO-ENTMCNC: 33.5 G/DL (ref 31.4–37.4)
MCV RBC AUTO: 93 FL (ref 82–98)
MONOCYTES # BLD AUTO: 0.83 THOUSAND/ΜL (ref 0.17–1.22)
MONOCYTES NFR BLD AUTO: 10 % (ref 4–12)
NEUTROPHILS # BLD AUTO: 5.97 THOUSANDS/ΜL (ref 1.85–7.62)
NEUTS SEG NFR BLD AUTO: 72 % (ref 43–75)
NONHDLC SERPL-MCNC: 78 MG/DL
NRBC BLD AUTO-RTO: 0 /100 WBCS
PLATELET # BLD AUTO: 210 THOUSANDS/UL (ref 149–390)
PMV BLD AUTO: 9.9 FL (ref 8.9–12.7)
POTASSIUM SERPL-SCNC: 4.5 MMOL/L (ref 3.5–5.3)
PROT SERPL-MCNC: 7.6 G/DL (ref 6.4–8.2)
RBC # BLD AUTO: 4.93 MILLION/UL (ref 3.88–5.62)
SODIUM SERPL-SCNC: 139 MMOL/L (ref 136–145)
TRIGL SERPL-MCNC: 71 MG/DL
TSH SERPL DL<=0.05 MIU/L-ACNC: 1.94 UIU/ML (ref 0.36–3.74)
WBC # BLD AUTO: 8.18 THOUSAND/UL (ref 4.31–10.16)

## 2019-10-10 PROCEDURE — 83036 HEMOGLOBIN GLYCOSYLATED A1C: CPT

## 2019-10-10 PROCEDURE — 85025 COMPLETE CBC W/AUTO DIFF WBC: CPT

## 2019-10-10 PROCEDURE — 36415 COLL VENOUS BLD VENIPUNCTURE: CPT

## 2019-10-10 PROCEDURE — 80053 COMPREHEN METABOLIC PANEL: CPT

## 2019-10-10 PROCEDURE — 80061 LIPID PANEL: CPT

## 2019-10-10 PROCEDURE — 84443 ASSAY THYROID STIM HORMONE: CPT

## 2019-11-08 ENCOUNTER — TRANSCRIBE ORDERS (OUTPATIENT)
Dept: ADMINISTRATIVE | Facility: HOSPITAL | Age: 84
End: 2019-11-08

## 2019-11-08 DIAGNOSIS — I10 ESSENTIAL HYPERTENSION, MALIGNANT: Primary | ICD-10-CM

## 2019-11-13 ENCOUNTER — OFFICE VISIT (OUTPATIENT)
Dept: FAMILY MEDICINE CLINIC | Facility: CLINIC | Age: 84
End: 2019-11-13
Payer: COMMERCIAL

## 2019-11-13 VITALS
DIASTOLIC BLOOD PRESSURE: 68 MMHG | HEART RATE: 60 BPM | HEIGHT: 67 IN | TEMPERATURE: 97.6 F | BODY MASS INDEX: 28.41 KG/M2 | SYSTOLIC BLOOD PRESSURE: 130 MMHG | RESPIRATION RATE: 16 BRPM | WEIGHT: 181 LBS

## 2019-11-13 DIAGNOSIS — I25.10 ATHEROSCLEROSIS OF NATIVE CORONARY ARTERY OF NATIVE HEART WITHOUT ANGINA PECTORIS: ICD-10-CM

## 2019-11-13 DIAGNOSIS — R26.2 AMBULATORY DYSFUNCTION: ICD-10-CM

## 2019-11-13 DIAGNOSIS — F41.1 GENERALIZED ANXIETY DISORDER: ICD-10-CM

## 2019-11-13 DIAGNOSIS — G31.84 MILD COGNITIVE IMPAIRMENT: ICD-10-CM

## 2019-11-13 DIAGNOSIS — H61.23 BILATERAL IMPACTED CERUMEN: ICD-10-CM

## 2019-11-13 DIAGNOSIS — I10 ESSENTIAL HYPERTENSION: Primary | ICD-10-CM

## 2019-11-13 DIAGNOSIS — N18.30 CHRONIC KIDNEY DISEASE, STAGE 3 (HCC): ICD-10-CM

## 2019-11-13 DIAGNOSIS — E78.2 MIXED HYPERLIPIDEMIA: ICD-10-CM

## 2019-11-13 DIAGNOSIS — E11.42 DM TYPE 2 WITH DIABETIC PERIPHERAL NEUROPATHY (HCC): ICD-10-CM

## 2019-11-13 PROCEDURE — 1036F TOBACCO NON-USER: CPT | Performed by: FAMILY MEDICINE

## 2019-11-13 PROCEDURE — 1160F RVW MEDS BY RX/DR IN RCRD: CPT | Performed by: FAMILY MEDICINE

## 2019-11-13 PROCEDURE — 99214 OFFICE O/P EST MOD 30 MIN: CPT | Performed by: FAMILY MEDICINE

## 2019-11-13 NOTE — PROGRESS NOTES
Chief Complaint   Patient presents with    Follow-up     Ear impaction     Health Maintenance   Topic Date Due    SLP PLAN OF CARE  1935    BMI: Followup Plan  11/27/1953    HEPATITIS B VACCINES (1 of 3 - Risk 3-dose series) 11/27/1954    DTaP,Tdap,and Td Vaccines (1 - Tdap) 11/27/1956    Pneumococcal Vaccine: 65+ Years (2 of 2 - PCV13) 10/18/2008    DM Eye Exam  10/23/2019    HEMOGLOBIN A1C  04/10/2020    URINE MICROALBUMIN  04/30/2020    Fall Risk  05/08/2020    Depression Screening PHQ  05/08/2020    Medicare Annual Wellness Visit (AWV)  05/08/2020    Diabetic Foot Exam  11/11/2020    BMI: Adult  11/13/2020    CRC Screening: Colonoscopy  12/04/2021    INFLUENZA VACCINE  Completed    Pneumococcal Vaccine: Pediatrics (0 to 5 Years) and At-Risk Patients (6 to 59 Years)  Aged Out   BMI Counseling: Body mass index is 28 35 kg/m²  The BMI is above normal  Nutrition recommendations include reducing portion sizes, decreasing overall calorie intake, 3-5 servings of fruits/vegetables daily, reducing fast food intake, consuming healthier snacks, decreasing soda and/or juice intake, moderation in carbohydrate intake, increasing intake of lean protein, reducing intake of saturated fat and trans fat and reducing intake of cholesterol  Exercise recommendations include moderate aerobic physical activity for 150 minutes/week  Assessment/Plan:    Essential hypertension  Blood pressure is adequately controlled  Continue Losartan 100 mg daily  Atherosclerosis of native coronary artery of native heart without angina pectoris  Continue aspirin, statin therapy  Follow- up with cardiologist Dr Alberta Blizzard annually  DM type 2 with diabetic peripheral neuropathy (HCC)    Lab Results   Component Value Date    HGBA1C 7 6 (H) 10/10/2019     Hb A1C improved  Continue current medical regimen as per endocrinology Dr Alexey Anton  Check eye exam by ophthalmologist Dr Chris Daniels annually      Follow- up with podiatry   Mckenna for routine foot care  Chronic kidney disease, stage 3  Creatinine level is stable  Will continue to monitor labs  Recommended to avoid NSAID's  Stay well hydrated  Mixed hyperlipidemia  Hyperlipidemia -controlled  Continue Simvastatin 40 mg daily, Omega 3  Anxiety disorder  Symptoms controlled on Zoloft 50 mg daily  Ambulatory dysfunction  Continue to use a walker for ambulation to prevent falls  Discussed fall precautions with patient and his wife  Mild cognitive impairment  Cognitive status is stable  No behavior disturbances  Encouraged to stay mentally, socially and physically active  Family declined geriatric assessment  Bilateral impacted cerumen  Cerumen impaction removed by irrigation with warm water  TM intact BL  Patient tolerated procedure well  Schedule follow-up office visit in 6 months  Check labs prior to next visit  Diagnoses and all orders for this visit:    Essential hypertension  -     Comprehensive metabolic panel; Future    Atherosclerosis of native coronary artery of native heart without angina pectoris    DM type 2 with diabetic peripheral neuropathy ([facility] [address] )  -     Comprehensive metabolic panel; Future    Chronic kidney disease, stage 3 (HCC)  -     Comprehensive metabolic panel; Future    Mixed hyperlipidemia  -     Lipid panel; Future    Generalized anxiety disorder    Ambulatory dysfunction    Mild cognitive impairment  -     TSH, 3rd generation; Future    Bilateral impacted cerumen    Other orders  -     Insulin Glargine (BASAGLAR KWIKPEN SC); Inject 10 Units under the skin every morning          Subjective:      Patient ID: Elsie Jaramillo is a 80 y o  male  HPI     Patient presents for 6 month follow-up of chronic medical conditions  He came accompanied by his wife        PMHx: Type 2 DM with  diabetic nephropathy, diabetic neuropathy, HTN, CAD, SVT, S/P AV node ablation, CKD stage 3, Hyperlipidemia, BPH, chronic low back pain secondary to DDD, lumbar spondylosis, OA, Anxiety, mild cognitive impairment, ambulatory dysfunction      Reviewed current medications, blood test results from 10/10/19  Cholesterol 120, HDL 42, LDL 64, fasting blood sugar 169, creatinine 1 42 -stable  Hb A1c 7 6 ( improved from 10 3 in 4/19) TSH 1 940  HTN / CAD -  patient denies chest pain, dizziness shortness of breath  He has been followed by cardiologist Dr Norman Juarez annually, was seen on 11/6/19  No changes were made to his medical regimen  Type 2 DM - patient has been [facility] by [facility]ocrinology Dr Vamsi Reardon, last seen in 7/19  Hb A1c has improved after starting on Basaglar Insulin 10 units daily  Patient continues on Glimepiride 4 mg twice daily, Acarbose 100 mg 3 times daily with meal periods  Patient's wife states that endocrinology recommended to complete Januvia this month  Eye exams per ophthalmologist Dr Ileana Sagastume, appointment scheduled in January 2020  Patient was seen by podiatrist Dr Selina Ribera on 11/11/19      hyperlipidemia-controlled on Simvastatin 40 mg daily  Anxiety disorder-mood has been stable  Patient takes Zoloft 50 mg daily  Mild cognitive impairment - cognitive status is stable  Patient's wife supervises medications, provides transportation  No behavior disturbances  No recent falls  Colonoscopy done in 12/14 by colorectal surgeon Dr Jen Glaser who recommended to repeat colonoscopy in 5 years  Patient had flu shot done at [facility] pharmacy in September 2019  The following portions of the patient's history were reviewed and updated as appropriate: allergies, current medications, past medical history, past social history, past surgical history and problem list     Review of Systems   Constitutional: Positive for fatigue (mild)  Negative for activity change, appetite change, chills and fever  HENT: Positive for hearing loss (mild)   Negative for congestion, ear pain, mouth sores, nosebleeds, sore throat, tinnitus and trouble swallowing  C/o clogged ears   Eyes: Negative for pain, discharge, redness, itching and visual disturbance  Respiratory: Negative for cough, chest tightness, shortness of breath and wheezing  Cardiovascular: Negative for chest pain, palpitations and leg swelling  Gastrointestinal: Negative for abdominal pain, blood in stool, constipation, diarrhea, nausea and vomiting  Genitourinary: Negative for difficulty urinating, dysuria, flank pain, frequency and hematuria  Nocturia x 2   Musculoskeletal: Positive for arthralgias (arthritic pain in knees) and gait problem (ambulates with a walker)  Negative for back pain, joint swelling and neck pain  Skin: Negative for rash and wound  Neurological: Positive for numbness (mild numbness in feet )  Negative for dizziness, syncope and headaches  Short-term memory loss   Hematological: Negative  Psychiatric/Behavioral: Negative for behavioral problems  Anxiety - mood has been stable  Sleep improved         Objective:      /68 (BP Location: Left arm, Patient Position: Sitting, Cuff Size: Adult)   Pulse 60   Temp 97 6 °F (36 4 °C) (Tympanic)   Resp 16   Ht 5' 7" (1 702 m)   Wt 82 1 kg (181 lb)   BMI 28 35 kg/m²          Physical Exam   Constitutional: He appears well-developed and well-nourished  HENT:   Head: Normocephalic and atraumatic  Mouth/Throat: Oropharynx is clear and moist    Extr  ear canals occluded with wax BL  TM can not be visualized  Eyes: Pupils are equal, round, and reactive to light  Conjunctivae are normal    Neck: Normal range of motion  Neck supple  No JVD present  Cardiovascular: Normal rate, regular rhythm and normal heart sounds  No murmur heard  No carotid bruits BL  No BL LE edema   Pulmonary/Chest: Effort normal and breath sounds normal    Abdominal: Soft  Bowel sounds are normal  There is no tenderness     Musculoskeletal:   Ambulates with a walker Skin: Skin is warm and dry  No rash noted  Psychiatric: He has a normal mood and affect  His behavior is normal    Nursing note and vitals reviewed

## 2019-11-14 NOTE — ASSESSMENT & PLAN NOTE
Lab Results   Component Value Date    HGBA1C 7 6 (H) 10/10/2019     Hb A1C improved  Continue current medical regimen as per endocrinology Dr Seng Julian  Check eye exam by ophthalmologist Dr Yaritza Rodriguez annually  Follow- up with podiatry Dr Callie Pisano for routine foot care

## 2019-11-14 NOTE — ASSESSMENT & PLAN NOTE
Cognitive status is stable  No behavior disturbances  Encouraged to stay mentally, socially and physically active  Family declined geriatric assessment

## 2019-11-14 NOTE — ASSESSMENT & PLAN NOTE
Cerumen impaction removed by irrigation with warm water  TM intact BL  Patient tolerated procedure well

## 2019-11-14 NOTE — ASSESSMENT & PLAN NOTE
Continue to use a walker for ambulation to prevent falls  Discussed fall precautions with patient and his wife

## 2019-11-14 NOTE — ASSESSMENT & PLAN NOTE
Creatinine level is stable  Will continue to monitor labs  Recommended to avoid NSAID's  Stay well hydrated

## 2019-12-31 ENCOUNTER — HOSPITAL ENCOUNTER (OUTPATIENT)
Dept: NON INVASIVE DIAGNOSTICS | Facility: HOSPITAL | Age: 84
Discharge: HOME | End: 2019-12-31
Payer: COMMERCIAL

## 2019-12-31 DIAGNOSIS — I10 ESSENTIAL HYPERTENSION, MALIGNANT: ICD-10-CM

## 2019-12-31 PROCEDURE — 93306 TTE W/DOPPLER COMPLETE: CPT | Performed by: INTERNAL MEDICINE

## 2019-12-31 PROCEDURE — 93306 TTE W/DOPPLER COMPLETE: CPT

## 2020-01-22 LAB
LEFT EYE DIABETIC RETINOPATHY: NORMAL
RIGHT EYE DIABETIC RETINOPATHY: NORMAL

## 2020-03-09 DIAGNOSIS — E78.5 HYPERLIPIDEMIA, UNSPECIFIED HYPERLIPIDEMIA TYPE: ICD-10-CM

## 2020-03-09 DIAGNOSIS — E11.65 TYPE 2 DIABETES MELLITUS WITH HYPERGLYCEMIA, WITHOUT LONG-TERM CURRENT USE OF INSULIN (HCC): ICD-10-CM

## 2020-03-09 RX ORDER — GLIMEPIRIDE 4 MG/1
4 TABLET ORAL 2 TIMES DAILY
Qty: 180 TABLET | Refills: 3 | Status: SHIPPED | OUTPATIENT
Start: 2020-03-09 | End: 2020-12-16

## 2020-03-09 RX ORDER — SIMVASTATIN 40 MG
TABLET ORAL
Qty: 90 TABLET | Refills: 3 | Status: SHIPPED | OUTPATIENT
Start: 2020-03-09 | End: 2020-12-16

## 2020-09-09 ENCOUNTER — TRANSCRIBE ORDERS (OUTPATIENT)
Dept: ADMINISTRATIVE | Facility: HOSPITAL | Age: 85
End: 2020-09-09

## 2020-09-09 ENCOUNTER — LAB (OUTPATIENT)
Dept: LAB | Facility: MEDICAL CENTER | Age: 85
End: 2020-09-09
Payer: COMMERCIAL

## 2020-09-09 DIAGNOSIS — N18.30 CHRONIC KIDNEY DISEASE, STAGE III (MODERATE) (HCC): ICD-10-CM

## 2020-09-09 DIAGNOSIS — E11.42 DM TYPE 2 WITH DIABETIC PERIPHERAL NEUROPATHY (HCC): ICD-10-CM

## 2020-09-09 DIAGNOSIS — I10 ESSENTIAL HYPERTENSION, BENIGN: Primary | ICD-10-CM

## 2020-09-09 DIAGNOSIS — I10 ESSENTIAL HYPERTENSION, BENIGN: ICD-10-CM

## 2020-09-09 LAB
ALBUMIN SERPL BCP-MCNC: 3.7 G/DL (ref 3.5–5)
ALP SERPL-CCNC: 89 U/L (ref 46–116)
ALT SERPL W P-5'-P-CCNC: 41 U/L (ref 12–78)
ANION GAP SERPL CALCULATED.3IONS-SCNC: 8 MMOL/L (ref 4–13)
AST SERPL W P-5'-P-CCNC: 21 U/L (ref 5–45)
BILIRUB SERPL-MCNC: 0.75 MG/DL (ref 0.2–1)
BUN SERPL-MCNC: 25 MG/DL (ref 5–25)
CALCIUM SERPL-MCNC: 9.3 MG/DL (ref 8.3–10.1)
CHLORIDE SERPL-SCNC: 104 MMOL/L (ref 100–108)
CHOLEST SERPL-MCNC: 135 MG/DL (ref 50–200)
CO2 SERPL-SCNC: 25 MMOL/L (ref 21–32)
CREAT SERPL-MCNC: 1.46 MG/DL (ref 0.6–1.3)
GFR SERPL CREATININE-BSD FRML MDRD: 44 ML/MIN/1.73SQ M
GLUCOSE P FAST SERPL-MCNC: 188 MG/DL (ref 65–99)
HDLC SERPL-MCNC: 46 MG/DL
LDLC SERPL CALC-MCNC: 72 MG/DL (ref 0–100)
NONHDLC SERPL-MCNC: 89 MG/DL
POTASSIUM SERPL-SCNC: 4.5 MMOL/L (ref 3.5–5.3)
PROT SERPL-MCNC: 7.3 G/DL (ref 6.4–8.2)
SODIUM SERPL-SCNC: 137 MMOL/L (ref 136–145)
TRIGL SERPL-MCNC: 86 MG/DL
TSH SERPL DL<=0.05 MIU/L-ACNC: 2.14 UIU/ML (ref 0.36–3.74)

## 2020-09-09 PROCEDURE — 80061 LIPID PANEL: CPT

## 2020-09-09 PROCEDURE — 80053 COMPREHEN METABOLIC PANEL: CPT

## 2020-09-09 PROCEDURE — 84443 ASSAY THYROID STIM HORMONE: CPT

## 2020-09-09 PROCEDURE — 36415 COLL VENOUS BLD VENIPUNCTURE: CPT

## 2020-09-10 NOTE — RESULT ENCOUNTER NOTE
I called and spoke to patient's wife  They will call back to scheduled back-to-back visits with Dr Billy Hector

## 2020-10-11 PROBLEM — H61.23 BILATERAL IMPACTED CERUMEN: Status: RESOLVED | Noted: 2019-05-08 | Resolved: 2020-10-11

## 2020-10-13 ENCOUNTER — OFFICE VISIT (OUTPATIENT)
Dept: FAMILY MEDICINE CLINIC | Facility: CLINIC | Age: 85
End: 2020-10-13
Payer: COMMERCIAL

## 2020-10-13 VITALS
HEIGHT: 67 IN | OXYGEN SATURATION: 99 % | DIASTOLIC BLOOD PRESSURE: 76 MMHG | BODY MASS INDEX: 28.09 KG/M2 | RESPIRATION RATE: 14 BRPM | SYSTOLIC BLOOD PRESSURE: 140 MMHG | HEART RATE: 66 BPM | TEMPERATURE: 98.9 F | WEIGHT: 179 LBS

## 2020-10-13 DIAGNOSIS — I10 ESSENTIAL HYPERTENSION: Primary | ICD-10-CM

## 2020-10-13 DIAGNOSIS — F03.90 DEMENTIA WITHOUT BEHAVIORAL DISTURBANCE, UNSPECIFIED DEMENTIA TYPE (HCC): ICD-10-CM

## 2020-10-13 DIAGNOSIS — E78.2 MIXED HYPERLIPIDEMIA: ICD-10-CM

## 2020-10-13 DIAGNOSIS — R26.2 AMBULATORY DYSFUNCTION: ICD-10-CM

## 2020-10-13 DIAGNOSIS — E11.21 DIABETIC NEPHROPATHY ASSOCIATED WITH TYPE 2 DIABETES MELLITUS (HCC): ICD-10-CM

## 2020-10-13 DIAGNOSIS — H61.23 BILATERAL IMPACTED CERUMEN: ICD-10-CM

## 2020-10-13 DIAGNOSIS — F41.1 GENERALIZED ANXIETY DISORDER: ICD-10-CM

## 2020-10-13 DIAGNOSIS — I25.10 ATHEROSCLEROSIS OF NATIVE CORONARY ARTERY OF NATIVE HEART WITHOUT ANGINA PECTORIS: ICD-10-CM

## 2020-10-13 DIAGNOSIS — Z00.00 MEDICARE ANNUAL WELLNESS VISIT, SUBSEQUENT: ICD-10-CM

## 2020-10-13 DIAGNOSIS — E11.42 DM TYPE 2 WITH DIABETIC PERIPHERAL NEUROPATHY (HCC): ICD-10-CM

## 2020-10-13 DIAGNOSIS — N18.32 STAGE 3B CHRONIC KIDNEY DISEASE (HCC): ICD-10-CM

## 2020-10-13 LAB — SL AMB POCT HEMOGLOBIN AIC: 9.2 (ref ?–6.5)

## 2020-10-13 PROCEDURE — 3077F SYST BP >= 140 MM HG: CPT | Performed by: FAMILY MEDICINE

## 2020-10-13 PROCEDURE — 3078F DIAST BP <80 MM HG: CPT | Performed by: FAMILY MEDICINE

## 2020-10-13 PROCEDURE — G0439 PPPS, SUBSEQ VISIT: HCPCS | Performed by: FAMILY MEDICINE

## 2020-10-13 PROCEDURE — 1160F RVW MEDS BY RX/DR IN RCRD: CPT | Performed by: FAMILY MEDICINE

## 2020-10-13 PROCEDURE — 3725F SCREEN DEPRESSION PERFORMED: CPT | Performed by: FAMILY MEDICINE

## 2020-10-13 PROCEDURE — 99215 OFFICE O/P EST HI 40 MIN: CPT | Performed by: FAMILY MEDICINE

## 2020-10-13 PROCEDURE — 83036 HEMOGLOBIN GLYCOSYLATED A1C: CPT | Performed by: FAMILY MEDICINE

## 2020-10-13 PROCEDURE — 1125F AMNT PAIN NOTED PAIN PRSNT: CPT | Performed by: FAMILY MEDICINE

## 2020-10-13 PROCEDURE — 1036F TOBACCO NON-USER: CPT | Performed by: FAMILY MEDICINE

## 2020-10-13 PROCEDURE — 1170F FXNL STATUS ASSESSED: CPT | Performed by: FAMILY MEDICINE

## 2020-12-16 DIAGNOSIS — E11.65 TYPE 2 DIABETES MELLITUS WITH HYPERGLYCEMIA, WITHOUT LONG-TERM CURRENT USE OF INSULIN (HCC): ICD-10-CM

## 2020-12-16 DIAGNOSIS — E78.5 HYPERLIPIDEMIA, UNSPECIFIED HYPERLIPIDEMIA TYPE: ICD-10-CM

## 2020-12-16 RX ORDER — SIMVASTATIN 40 MG
TABLET ORAL
Qty: 90 TABLET | Refills: 3 | Status: SHIPPED | OUTPATIENT
Start: 2020-12-16

## 2020-12-16 RX ORDER — GLIMEPIRIDE 4 MG/1
TABLET ORAL
Qty: 180 TABLET | Refills: 3 | Status: SHIPPED | OUTPATIENT
Start: 2020-12-16 | End: 2021-06-09 | Stop reason: HOSPADM

## 2021-01-02 ENCOUNTER — HOSPITAL ENCOUNTER (EMERGENCY)
Facility: HOSPITAL | Age: 86
Discharge: HOME | End: 2021-01-02
Attending: EMERGENCY MEDICINE | Admitting: EMERGENCY MEDICINE
Payer: COMMERCIAL

## 2021-01-02 VITALS
DIASTOLIC BLOOD PRESSURE: 79 MMHG | WEIGHT: 174.16 LBS | HEART RATE: 69 BPM | BODY MASS INDEX: 27.28 KG/M2 | OXYGEN SATURATION: 97 % | SYSTOLIC BLOOD PRESSURE: 164 MMHG | TEMPERATURE: 98.1 F | RESPIRATION RATE: 16 BRPM

## 2021-01-02 DIAGNOSIS — I10 HYPERTENSION: Primary | ICD-10-CM

## 2021-01-02 PROCEDURE — 99282 EMERGENCY DEPT VISIT SF MDM: CPT | Performed by: EMERGENCY MEDICINE

## 2021-01-02 PROCEDURE — 99283 EMERGENCY DEPT VISIT LOW MDM: CPT

## 2021-01-03 NOTE — DISCHARGE INSTRUCTIONS
Please follow provided return precautions  Please follow up with your primary care doctor for further management of your blood pressure  Thank you

## 2021-01-03 NOTE — ED PROVIDER NOTES
History  Chief Complaint   Patient presents with    Hypertension     Pt's wife reports pt having HTN this evening  Pt stated he did not feel well, but could not elaborate for wife as to how he felt  Hx dementia     80year old M with a PMHx of HTN and CABG who presents for hypertension measured at home  Wife provides most of history given patient's dementia  Wife describes that she checked his blood pressure this morning and it was elevated  On repeat checks, it went as high as 207 SBP  Wife reports patient seemed more anxious and antsy than usual today  Wife does not report any covid symptoms ,no loss of taste or smell, no increased WOB, no cough or congestion, and strict quarantine precautions  No chest pain  No other symptoms reported  Prior to Admission Medications   Prescriptions Last Dose Informant Patient Reported? Taking? Insulin Glargine (BASAGLAR KWIKPEN SC)  Self Yes Yes   Sig: Inject 10 Units under the skin every morning   Multiple Vitamin (MULTIVITAMIN) tablet  Self Yes No   Sig: Take 1 tablet by mouth daily   Omega-3 Fatty Acids (FISH OIL PO)  Self Yes Yes   Sig: Take 1 tablet by mouth 2 (two) times a day   acarbose (PRECOSE) 100 MG tablet  Self Yes Yes   Sig: Take 100 mg by mouth 3 (three) times a day with meals   acetaminophen (TYLENOL) 325 mg tablet  Self No Yes   Simg by mouth every 6 hours for pain  Disp #90 Refills: 1   aspirin 81 MG tablet  Self Yes Yes   Sig: Take 81 mg by mouth daily     cholecalciferol (VITAMIN D3) 1,000 units tablet  Self Yes Yes   Sig: Take 2,000 Units by mouth 2 (two) times a day     glimepiride (AMARYL) 4 mg tablet   No Yes   Sig: TAKE 1 TABLET TWICE A DAY   ketoconazole (NIZORAL) 2 % cream  Self Yes Yes   Sig: APPLY TO AFFECTED AREA TWICE A DAY ON BOTH FEET BOTTOM AND TOES   losartan (COZAAR) 100 MG tablet  Self Yes Yes   Sig: Take 100 mg by mouth daily   melatonin 3 mg  Self Yes Yes   Sig: Take 3 mg by mouth daily at bedtime   sertraline (ZOLOFT) 50 mg tablet  Self Yes Yes   Sig: Take 50 mg by mouth daily   simvastatin (ZOCOR) 40 mg tablet   No Yes   Sig: TAKE 1 TABLET AT BEDTIME      Facility-Administered Medications: None       Past Medical History:   Diagnosis Date    Anal fissure     Anxiety     Cervical spinal stenosis     CKD (chronic kidney disease) stage 3, GFR 30-59 ml/min     Coronary artery disease     Degenerative joint disease of ankle and foot, unspecified laterality     Dementia ([facility] [address] )     Disc degeneration, lumbar     Diverticulosis     Erectile dysfunction of non-organic origin     Hematuria     last assessed 8/28/12    Hyperlipidemia     Hypertension     IBS (irritable bowel syndrome)     Lumbar radiculopathy     Multiple benign polyps of large intestine     benign, last assessed 6/26/12    Osteoarthritis     hip    PSVT (paroxysmal supraventricular tachycardia) ([facility] ) 02/2013    S/P CABG x [address] [facility]    Spondylolisthesis of lumbar region     Type 2 diabetes mellitus with diabetic neuropathy (HCC)        Past Surgical History:   Procedure Laterality Date    CATARACT EXTRACTION Bilateral     COLONOSCOPY      COLONOSCOPY  12/2014    CORONARY ARTERY BYPASS GRAFT      1999 [id number]    ESOPHAGOGASTRODUODENOSCOPY      OR TOTAL HIP ARTHROPLASTY Left 2/3/2016    Procedure: ARTHROPLASTY HIP TOTAL;  Surgeon: Selvin Hansen MD;  [facility]: [facility];  Service: Orthopedics    TONSILLECTOMY      UMBILICAL HERNIA REPAIR      WRIST SURGERY Left        Family History   Problem Relation Age of Onset    Heart disease Mother     Dementia Mother     Heart disease Father     Heart attack Father         acute MI    Hypertension Other      I have reviewed and agree with the history as documented  E-Cigarette/Vaping     E-Cigarette/Vaping Substances     Social History     Tobacco Use    Smoking status: Former Smoker    Smokeless tobacco: Never Used   Substance Use Topics    Alcohol use: No    Drug use:  No Review of Systems   Constitutional: Negative for chills, diaphoresis, fatigue and fever  HENT: Negative for congestion and sore throat  Eyes: Negative for visual disturbance  Respiratory: Negative for cough, chest tightness and shortness of breath  Cardiovascular: Negative for chest pain, palpitations and leg swelling  Gastrointestinal: Negative for abdominal distention, abdominal pain, constipation, diarrhea, nausea and vomiting  Genitourinary: Negative for difficulty urinating and dysuria  Musculoskeletal: Negative for arthralgias and myalgias  Skin: Negative for rash  Neurological: Negative for dizziness, weakness, light-headedness, numbness and headaches  Psychiatric/Behavioral: Negative for agitation, behavioral problems and confusion  The patient is nervous/anxious  All other systems reviewed and are negative  Physical Exam  ED Triage Vitals   Temperature Pulse Respirations Blood Pressure SpO2   01/02/21 1833 01/02/21 1833 01/02/21 1833 01/02/21 1833 01/02/21 1833   98 1 °F (36 7 °C) 70 16 (!) 178/70 100 %      Temp Source Heart Rate Source Patient Position - Orthostatic VS BP Location FiO2 (%)   01/02/21 1833 01/02/21 1833 01/02/21 2130 01/02/21 2130 --   Temporal Monitor Sitting Left arm       Pain Score       --                    Orthostatic Vital Signs  Vitals:    01/02/21 1833 01/02/21 2130   BP: (!) 178/70 164/79   Pulse: 70 69   Patient Position - Orthostatic VS:  Sitting       Physical Exam  Constitutional:       Appearance: He is well-developed  HENT:      Head: Normocephalic and atraumatic  Right Ear: Tympanic membrane normal       Left Ear: Tympanic membrane normal       Mouth/Throat:      Mouth: Mucous membranes are moist    Cardiovascular:      Rate and Rhythm: Normal rate and regular rhythm  Heart sounds: Normal heart sounds  No murmur  Pulmonary:      Effort: Pulmonary effort is normal  No respiratory distress        Breath sounds: Normal breath sounds  Abdominal:      General: Bowel sounds are normal  There is no distension  Palpations: Abdomen is soft  Tenderness: There is no abdominal tenderness  Musculoskeletal:         General: No deformity  Skin:     General: Skin is warm  Findings: No rash  Neurological:      General: No focal deficit present  Mental Status: He is alert and oriented to person, place, and time  Cranial Nerves: No cranial nerve deficit  Sensory: No sensory deficit  Psychiatric:         Mood and Affect: Mood normal          Behavior: Behavior normal          Thought Content: Thought content normal          Judgment: Judgment normal       Comments: Patient is calm and well-appearing         ED Medications  Medications - No data to display    Diagnostic Studies  Results Reviewed     None                 No orders to display         Procedures  Procedures      ED Course                                       MDM  Number of Diagnoses or Management Options  Hypertension:   Diagnosis management comments: Patient's presentation is consistent with asymptomatic HTN  Patient is advised to follow up with PCP regarding these findings  No labs warranted at this time  Strict return precautions given and pt discharged  Disposition  Final diagnoses:   Hypertension     Time reflects when diagnosis was documented in both MDM as applicable and the Disposition within this note     Time User Action Codes Description Comment    1/2/2021 10:20 PM [address] [facility] [address] Hypertension       ED Disposition     ED Disposition Condition Date/Time Comment    Discharge Stable Sat Jan 2, 2021 10:20 PM [facility] discharge to home/self care              Follow-up Information     Follow up With Specialties Details Why Contact Info    Hadley Gonsalves MD [facility] Medicine Call   [facility] [address] [facility]  [phone]            Discharge Medication List as of 1/2/2021 10:21 PM      CONTINUE these medications which have NOT CHANGED    Details   acarbose (PRECOSE) 100 MG tablet Take 100 mg by mouth 3 (three) times a day with meals, Historical Med      acetaminophen (TYLENOL) 325 mg tablet 650mg by mouth every 6 hours for pain  Disp #90 Refills: 1, Print      aspirin 81 MG tablet Take 81 mg by mouth daily  , Historical Med      cholecalciferol (VITAMIN D3) 1,000 units tablet Take 2,000 Units by mouth 2 (two) times a day  , Historical Med      glimepiride (AMARYL) 4 mg tablet TAKE 1 TABLET TWICE A DAY, Normal      Insulin Glargine (BASAGLAR KWIKPEN SC) Inject 10 Units under the skin every morning, Historical Med      ketoconazole (NIZORAL) 2 % cream APPLY TO AFFECTED AREA TWICE A DAY ON BOTH FEET BOTTOM AND TOES, Historical Med      losartan (COZAAR) 100 MG tablet Take 100 mg by mouth daily, Starting Wed 2/6/2019, Until Sat 1/2/2021, Historical Med      melatonin 3 mg Take 3 mg by mouth daily at bedtime, Historical Med      Omega-3 Fatty Acids (FISH OIL PO) Take 1 tablet by mouth 2 (two) times a day, Historical Med      sertraline (ZOLOFT) 50 mg tablet Take 50 mg by mouth daily, Starting Mon 8/27/2018, Historical Med      simvastatin (ZOCOR) 40 mg tablet TAKE 1 TABLET AT BEDTIME, Normal      Multiple Vitamin (MULTIVITAMIN) tablet Take 1 tablet by mouth daily, Historical Med           No discharge procedures on file  PDMP Review     None           ED Provider  Attending physically available and evaluated Karsten Lopez  [facility] managed the patient along with the ED Attending      Electronically Signed by         Roxie Alves MD  01/02/21 3954

## 2021-02-08 ENCOUNTER — IMMUNIZATIONS (OUTPATIENT)
Dept: FAMILY MEDICINE CLINIC | Facility: HOSPITAL | Age: 86
End: 2021-02-08

## 2021-02-08 DIAGNOSIS — Z23 ENCOUNTER FOR IMMUNIZATION: Primary | ICD-10-CM

## 2021-02-08 PROCEDURE — 91301 SARS-COV-2 / COVID-19 MRNA VACCINE (MODERNA) 100 MCG: CPT

## 2021-02-08 PROCEDURE — 0011A SARS-COV-2 / COVID-19 MRNA VACCINE (MODERNA) 100 MCG: CPT

## 2021-03-08 ENCOUNTER — IMMUNIZATIONS (OUTPATIENT)
Dept: FAMILY MEDICINE CLINIC | Facility: HOSPITAL | Age: 86
End: 2021-03-08

## 2021-03-08 DIAGNOSIS — Z23 ENCOUNTER FOR IMMUNIZATION: Primary | ICD-10-CM

## 2021-03-08 PROCEDURE — 0012A SARS-COV-2 / COVID-19 MRNA VACCINE (MODERNA) 100 MCG: CPT

## 2021-03-08 PROCEDURE — 91301 SARS-COV-2 / COVID-19 MRNA VACCINE (MODERNA) 100 MCG: CPT

## 2021-04-09 ENCOUNTER — LAB (OUTPATIENT)
Dept: LAB | Facility: MEDICAL CENTER | Age: 86
End: 2021-04-09
Payer: COMMERCIAL

## 2021-04-09 DIAGNOSIS — N18.32 STAGE 3B CHRONIC KIDNEY DISEASE (HCC): ICD-10-CM

## 2021-04-09 DIAGNOSIS — E78.2 MIXED HYPERLIPIDEMIA: ICD-10-CM

## 2021-04-09 DIAGNOSIS — E11.42 DM TYPE 2 WITH DIABETIC PERIPHERAL NEUROPATHY (HCC): ICD-10-CM

## 2021-04-09 DIAGNOSIS — I10 ESSENTIAL HYPERTENSION: ICD-10-CM

## 2021-04-09 LAB
ALBUMIN SERPL BCP-MCNC: 3.6 G/DL (ref 3.5–5)
ALP SERPL-CCNC: 97 U/L (ref 46–116)
ALT SERPL W P-5'-P-CCNC: 45 U/L (ref 12–78)
ANION GAP SERPL CALCULATED.3IONS-SCNC: 5 MMOL/L (ref 4–13)
AST SERPL W P-5'-P-CCNC: 21 U/L (ref 5–45)
BASOPHILS # BLD AUTO: 0.05 THOUSANDS/ΜL (ref 0–0.1)
BASOPHILS NFR BLD AUTO: 1 % (ref 0–1)
BILIRUB SERPL-MCNC: 0.86 MG/DL (ref 0.2–1)
BUN SERPL-MCNC: 28 MG/DL (ref 5–25)
CALCIUM SERPL-MCNC: 9.3 MG/DL (ref 8.3–10.1)
CHLORIDE SERPL-SCNC: 105 MMOL/L (ref 100–108)
CHOLEST SERPL-MCNC: 127 MG/DL (ref 50–200)
CO2 SERPL-SCNC: 27 MMOL/L (ref 21–32)
CREAT SERPL-MCNC: 1.53 MG/DL (ref 0.6–1.3)
EOSINOPHIL # BLD AUTO: 0.41 THOUSAND/ΜL (ref 0–0.61)
EOSINOPHIL NFR BLD AUTO: 5 % (ref 0–6)
ERYTHROCYTE [DISTWIDTH] IN BLOOD BY AUTOMATED COUNT: 12.8 % (ref 11.6–15.1)
GFR SERPL CREATININE-BSD FRML MDRD: 41 ML/MIN/1.73SQ M
GLUCOSE P FAST SERPL-MCNC: 206 MG/DL (ref 65–99)
HCT VFR BLD AUTO: 48.1 % (ref 36.5–49.3)
HDLC SERPL-MCNC: 48 MG/DL
HGB BLD-MCNC: 16.4 G/DL (ref 12–17)
IMM GRANULOCYTES # BLD AUTO: 0.06 THOUSAND/UL (ref 0–0.2)
IMM GRANULOCYTES NFR BLD AUTO: 1 % (ref 0–2)
LDLC SERPL CALC-MCNC: 61 MG/DL (ref 0–100)
LYMPHOCYTES # BLD AUTO: 1.45 THOUSANDS/ΜL (ref 0.6–4.47)
LYMPHOCYTES NFR BLD AUTO: 16 % (ref 14–44)
MCH RBC QN AUTO: 31.5 PG (ref 26.8–34.3)
MCHC RBC AUTO-ENTMCNC: 34.1 G/DL (ref 31.4–37.4)
MCV RBC AUTO: 93 FL (ref 82–98)
MONOCYTES # BLD AUTO: 0.74 THOUSAND/ΜL (ref 0.17–1.22)
MONOCYTES NFR BLD AUTO: 8 % (ref 4–12)
NEUTROPHILS # BLD AUTO: 6.35 THOUSANDS/ΜL (ref 1.85–7.62)
NEUTS SEG NFR BLD AUTO: 69 % (ref 43–75)
NONHDLC SERPL-MCNC: 79 MG/DL
NRBC BLD AUTO-RTO: 0 /100 WBCS
PLATELET # BLD AUTO: 211 THOUSANDS/UL (ref 149–390)
PMV BLD AUTO: 10 FL (ref 8.9–12.7)
POTASSIUM SERPL-SCNC: 4.3 MMOL/L (ref 3.5–5.3)
PROT SERPL-MCNC: 7.4 G/DL (ref 6.4–8.2)
RBC # BLD AUTO: 5.2 MILLION/UL (ref 3.88–5.62)
SODIUM SERPL-SCNC: 137 MMOL/L (ref 136–145)
TRIGL SERPL-MCNC: 92 MG/DL
WBC # BLD AUTO: 9.06 THOUSAND/UL (ref 4.31–10.16)

## 2021-04-09 PROCEDURE — 85025 COMPLETE CBC W/AUTO DIFF WBC: CPT

## 2021-04-09 PROCEDURE — 80053 COMPREHEN METABOLIC PANEL: CPT

## 2021-04-09 PROCEDURE — 36415 COLL VENOUS BLD VENIPUNCTURE: CPT

## 2021-04-09 PROCEDURE — 80061 LIPID PANEL: CPT

## 2021-06-02 ENCOUNTER — TELEPHONE (OUTPATIENT)
Dept: FAMILY MEDICINE CLINIC | Facility: CLINIC | Age: 86
End: 2021-06-02

## 2021-06-02 ENCOUNTER — HOSPITAL ENCOUNTER (INPATIENT)
Facility: HOSPITAL | Age: 86
LOS: 7 days | Discharge: NURSING, REHAB OR LONG-TERM CARE | DRG: 556 | End: 2021-06-09
Attending: EMERGENCY MEDICINE | Admitting: INTERNAL MEDICINE
Payer: COMMERCIAL

## 2021-06-02 DIAGNOSIS — R26.2 AMBULATORY DYSFUNCTION: ICD-10-CM

## 2021-06-02 DIAGNOSIS — E11.65 TYPE 2 DIABETES MELLITUS WITH HYPERGLYCEMIA, WITHOUT LONG-TERM CURRENT USE OF INSULIN (HCC): ICD-10-CM

## 2021-06-02 DIAGNOSIS — E11.65 HYPERGLYCEMIA DUE TO DIABETES MELLITUS (HCC): ICD-10-CM

## 2021-06-02 DIAGNOSIS — L30.4 INTERTRIGO: ICD-10-CM

## 2021-06-02 DIAGNOSIS — R53.1 GENERALIZED WEAKNESS: Primary | ICD-10-CM

## 2021-06-02 DIAGNOSIS — Z86.59 HISTORY OF DEMENTIA: ICD-10-CM

## 2021-06-02 DIAGNOSIS — F03.90 DEMENTIA WITHOUT BEHAVIORAL DISTURBANCE (HCC): ICD-10-CM

## 2021-06-02 DIAGNOSIS — R53.1 WEAKNESS: Primary | ICD-10-CM

## 2021-06-02 PROBLEM — Z79.4 TYPE 2 DIABETES MELLITUS WITH STAGE 3 CHRONIC KIDNEY DISEASE, WITH LONG-TERM CURRENT USE OF INSULIN (HCC): Status: ACTIVE | Noted: 2018-04-03

## 2021-06-02 PROBLEM — E11.22 TYPE 2 DIABETES MELLITUS WITH STAGE 3 CHRONIC KIDNEY DISEASE, WITH LONG-TERM CURRENT USE OF INSULIN (HCC): Status: ACTIVE | Noted: 2018-04-03

## 2021-06-02 PROBLEM — N18.30 TYPE 2 DIABETES MELLITUS WITH STAGE 3 CHRONIC KIDNEY DISEASE, WITH LONG-TERM CURRENT USE OF INSULIN (HCC): Status: ACTIVE | Noted: 2018-04-03

## 2021-06-02 LAB
ALBUMIN SERPL BCP-MCNC: 2.5 G/DL (ref 3.5–5)
ALP SERPL-CCNC: 105 U/L (ref 46–116)
ALT SERPL W P-5'-P-CCNC: 25 U/L (ref 12–78)
ANION GAP SERPL CALCULATED.3IONS-SCNC: 10 MMOL/L (ref 4–13)
AST SERPL W P-5'-P-CCNC: 22 U/L (ref 5–45)
ATRIAL RATE: 135 BPM
ATRIAL RATE: 75 BPM
BACTERIA UR QL AUTO: ABNORMAL /HPF
BASOPHILS # BLD AUTO: 0.04 THOUSANDS/ΜL (ref 0–0.1)
BASOPHILS NFR BLD AUTO: 1 % (ref 0–1)
BILIRUB SERPL-MCNC: 0.47 MG/DL (ref 0.2–1)
BILIRUB UR QL STRIP: NEGATIVE
BUN SERPL-MCNC: 25 MG/DL (ref 5–25)
CALCIUM ALBUM COR SERPL-MCNC: 10.2 MG/DL (ref 8.3–10.1)
CALCIUM SERPL-MCNC: 9 MG/DL (ref 8.3–10.1)
CHLORIDE SERPL-SCNC: 99 MMOL/L (ref 100–108)
CLARITY UR: CLEAR
CO2 SERPL-SCNC: 30 MMOL/L (ref 21–32)
COLOR UR: YELLOW
CREAT SERPL-MCNC: 1.33 MG/DL (ref 0.6–1.3)
EOSINOPHIL # BLD AUTO: 0.2 THOUSAND/ΜL (ref 0–0.61)
EOSINOPHIL NFR BLD AUTO: 2 % (ref 0–6)
ERYTHROCYTE [DISTWIDTH] IN BLOOD BY AUTOMATED COUNT: 11.9 % (ref 11.6–15.1)
GFR SERPL CREATININE-BSD FRML MDRD: 48 ML/MIN/1.73SQ M
GLUCOSE SERPL-MCNC: 139 MG/DL (ref 65–140)
GLUCOSE SERPL-MCNC: 231 MG/DL (ref 65–140)
GLUCOSE SERPL-MCNC: 335 MG/DL (ref 65–140)
GLUCOSE UR STRIP-MCNC: ABNORMAL MG/DL
HCT VFR BLD AUTO: 44.1 % (ref 36.5–49.3)
HGB BLD-MCNC: 14.9 G/DL (ref 12–17)
HGB UR QL STRIP.AUTO: NEGATIVE
IMM GRANULOCYTES # BLD AUTO: 0.07 THOUSAND/UL (ref 0–0.2)
IMM GRANULOCYTES NFR BLD AUTO: 1 % (ref 0–2)
KETONES UR STRIP-MCNC: NEGATIVE MG/DL
LEUKOCYTE ESTERASE UR QL STRIP: ABNORMAL
LYMPHOCYTES # BLD AUTO: 0.71 THOUSANDS/ΜL (ref 0.6–4.47)
LYMPHOCYTES NFR BLD AUTO: 9 % (ref 14–44)
MCH RBC QN AUTO: 31 PG (ref 26.8–34.3)
MCHC RBC AUTO-ENTMCNC: 33.8 G/DL (ref 31.4–37.4)
MCV RBC AUTO: 92 FL (ref 82–98)
MONOCYTES # BLD AUTO: 0.95 THOUSAND/ΜL (ref 0.17–1.22)
MONOCYTES NFR BLD AUTO: 11 % (ref 4–12)
NEUTROPHILS # BLD AUTO: 6.43 THOUSANDS/ΜL (ref 1.85–7.62)
NEUTS SEG NFR BLD AUTO: 76 % (ref 43–75)
NITRITE UR QL STRIP: NEGATIVE
NON-SQ EPI CELLS URNS QL MICRO: ABNORMAL /HPF
NRBC BLD AUTO-RTO: 0 /100 WBCS
P AXIS: 35 DEGREES
P AXIS: 39 DEGREES
PH UR STRIP.AUTO: 5.5 [PH] (ref 4.5–8)
PLATELET # BLD AUTO: 275 THOUSANDS/UL (ref 149–390)
PMV BLD AUTO: 9 FL (ref 8.9–12.7)
POTASSIUM SERPL-SCNC: 4.1 MMOL/L (ref 3.5–5.3)
PR INTERVAL: 264 MS
PROT SERPL-MCNC: 6.8 G/DL (ref 6.4–8.2)
PROT UR STRIP-MCNC: ABNORMAL MG/DL
QRS AXIS: -36 DEGREES
QRS AXIS: -38 DEGREES
QRSD INTERVAL: 142 MS
QRSD INTERVAL: 96 MS
QT INTERVAL: 316 MS
QT INTERVAL: 404 MS
QTC INTERVAL: 451 MS
QTC INTERVAL: 474 MS
RBC # BLD AUTO: 4.81 MILLION/UL (ref 3.88–5.62)
RBC #/AREA URNS AUTO: ABNORMAL /HPF
SODIUM SERPL-SCNC: 139 MMOL/L (ref 136–145)
SP GR UR STRIP.AUTO: 1.01 (ref 1–1.03)
T WAVE AXIS: 29 DEGREES
T WAVE AXIS: 41 DEGREES
TROPONIN I SERPL-MCNC: <0.02 NG/ML
UROBILINOGEN UR QL STRIP.AUTO: 0.2 E.U./DL
VENTRICULAR RATE: 135 BPM
VENTRICULAR RATE: 75 BPM
WBC # BLD AUTO: 8.4 THOUSAND/UL (ref 4.31–10.16)
WBC #/AREA URNS AUTO: ABNORMAL /HPF

## 2021-06-02 PROCEDURE — 93010 ELECTROCARDIOGRAM REPORT: CPT | Performed by: INTERNAL MEDICINE

## 2021-06-02 PROCEDURE — 85025 COMPLETE CBC W/AUTO DIFF WBC: CPT | Performed by: EMERGENCY MEDICINE

## 2021-06-02 PROCEDURE — 81001 URINALYSIS AUTO W/SCOPE: CPT

## 2021-06-02 PROCEDURE — 80053 COMPREHEN METABOLIC PANEL: CPT | Performed by: EMERGENCY MEDICINE

## 2021-06-02 PROCEDURE — 93005 ELECTROCARDIOGRAM TRACING: CPT

## 2021-06-02 PROCEDURE — 82948 REAGENT STRIP/BLOOD GLUCOSE: CPT

## 2021-06-02 PROCEDURE — 36415 COLL VENOUS BLD VENIPUNCTURE: CPT | Performed by: EMERGENCY MEDICINE

## 2021-06-02 PROCEDURE — 99285 EMERGENCY DEPT VISIT HI MDM: CPT | Performed by: PHYSICIAN ASSISTANT

## 2021-06-02 PROCEDURE — 99222 1ST HOSP IP/OBS MODERATE 55: CPT | Performed by: INTERNAL MEDICINE

## 2021-06-02 PROCEDURE — 99285 EMERGENCY DEPT VISIT HI MDM: CPT

## 2021-06-02 PROCEDURE — 84484 ASSAY OF TROPONIN QUANT: CPT | Performed by: PHYSICIAN ASSISTANT

## 2021-06-02 RX ORDER — LANOLIN ALCOHOL/MO/W.PET/CERES
3 CREAM (GRAM) TOPICAL
Status: DISCONTINUED | OUTPATIENT
Start: 2021-06-02 | End: 2021-06-09 | Stop reason: HOSPADM

## 2021-06-02 RX ORDER — INSULIN GLARGINE 100 [IU]/ML
20 INJECTION, SOLUTION SUBCUTANEOUS
Status: DISCONTINUED | OUTPATIENT
Start: 2021-06-02 | End: 2021-06-09 | Stop reason: HOSPADM

## 2021-06-02 RX ORDER — LOSARTAN POTASSIUM 50 MG/1
100 TABLET ORAL DAILY
Status: DISCONTINUED | OUTPATIENT
Start: 2021-06-03 | End: 2021-06-09 | Stop reason: HOSPADM

## 2021-06-02 RX ORDER — ASPIRIN 81 MG/1
81 TABLET, CHEWABLE ORAL DAILY
Status: DISCONTINUED | OUTPATIENT
Start: 2021-06-03 | End: 2021-06-09 | Stop reason: HOSPADM

## 2021-06-02 RX ORDER — PRAVASTATIN SODIUM 40 MG
40 TABLET ORAL
Status: DISCONTINUED | OUTPATIENT
Start: 2021-06-03 | End: 2021-06-09 | Stop reason: HOSPADM

## 2021-06-02 RX ORDER — NYSTATIN 100000 [USP'U]/G
POWDER TOPICAL 2 TIMES DAILY
Status: DISCONTINUED | OUTPATIENT
Start: 2021-06-02 | End: 2021-06-09 | Stop reason: HOSPADM

## 2021-06-02 RX ADMIN — INSULIN LISPRO 6 UNITS: 100 INJECTION, SOLUTION INTRAVENOUS; SUBCUTANEOUS at 19:35

## 2021-06-02 RX ADMIN — MELATONIN 3 MG: at 21:54

## 2021-06-02 RX ADMIN — INSULIN LISPRO 2 UNITS: 100 INJECTION, SOLUTION INTRAVENOUS; SUBCUTANEOUS at 19:37

## 2021-06-02 RX ADMIN — NYSTATIN: 100000 POWDER TOPICAL at 21:54

## 2021-06-02 RX ADMIN — INSULIN GLARGINE 20 UNITS: 100 INJECTION, SOLUTION SUBCUTANEOUS at 21:54

## 2021-06-02 NOTE — TELEPHONE ENCOUNTER
I spoke to Lino, patient's wife  She stated that he has been declining over the past 1-2 weeks, losing strength and mobility, refusing to eat like he used to, he is bladder incontinent, wears diapers but is soaking through  He is staying hydrated but again, not eating much  He also has redness on his scrotum that is very sensitive, she has been putting Desitin and vaseline on the area but it is bad  I advised an ER evaluation, and advised her to call and ambulance to transport him  She would have him to go [address] [facility]  as they live closer  Please place ADT order ASAP

## 2021-06-02 NOTE — ASSESSMENT & PLAN NOTE
Lab Results   Component Value Date    HGBA1C 9 2 (A) 10/13/2020     With hyperglycemia  Increase Lantus to 20 units at night  Add Humalog 6 units with meals  Given his age and comorbidities, this will allow higher blood sugar goal  Avoid hypoglycemia

## 2021-06-02 NOTE — ED PROVIDER NOTES
History  Chief Complaint   Patient presents with    Weakness - Generalized     Pt rpresents from home where family told EMS pt has had worsening weakness and incontinence  PCP told family to bring patient to ED  Patient has no complaints  Pre-hospital bgl was 488  Patient is an 77-year-old male with PMH of dementia, hypertension, diabetes, CKD who presents with generalized weakness and ambulatory dysfunction  Patient is demented, at baseline oriented to self and location  Patient has no complaints  Patient lives at home with wife who notes persistent decline and patient's conditions since May, worsening over the last 2 weeks  She states the patient typically walks at home with a walker, but has not used it in approximately 2 weeks  Because patient is not ambulating for lack of effort, he is urinating into his briefs frequently, and she has to change him frequently  She states the past patient was able to get up and take himself to the restroom, but he has not been able to do that, because he is unable to ambulate  She also notes erythema of the scrotum, which she has been applying topical lotions to help alleviate, with little relief  Wife denies any recent increase in his mental declined, just states it has been a progressive process and she can no longer take care of him  She also notes decreased appetite, but denies any vomiting, complaints of abdominal pain, diarrhea, constipation  Patient and wife deny any fevers, chills, diaphoresis, headache, dizziness, lightheadedness, vision changes, congestion, cough, shortness of breath, chest pain, palpitations, recent travel, known sick contacts  Prior to Admission Medications   Prescriptions Last Dose Informant Patient Reported? Taking?    Insulin Glargine (BASAGLAR KWIKPEN SC)  Self Yes No   Sig: Inject 10 Units under the skin every morning   Multiple Vitamin (MULTIVITAMIN) tablet  Self Yes No   Sig: Take 1 tablet by mouth daily   Omega-3 Fatty Acids (FISH OIL PO)  Self Yes No   Sig: Take 1 tablet by mouth 2 (two) times a day   acarbose (PRECOSE) 100 MG tablet  Self Yes No   Sig: Take 100 mg by mouth 3 (three) times a day with meals   acetaminophen (TYLENOL) 325 mg tablet  Self No No   Simg by mouth every 6 hours for pain  Disp #90 Refills: 1   aspirin 81 MG tablet  Self Yes No   Sig: Take 81 mg by mouth daily     cholecalciferol (VITAMIN D3) 1,000 units tablet  Self Yes No   Sig: Take 2,000 Units by mouth 2 (two) times a day     glimepiride (AMARYL) 4 mg tablet   No No   Sig: TAKE 1 TABLET TWICE A DAY   ketoconazole (NIZORAL) 2 % cream  Self Yes No   Sig: APPLY TO AFFECTED AREA TWICE A DAY ON BOTH FEET BOTTOM AND TOES   losartan (COZAAR) 100 MG tablet  Self Yes No   Sig: Take 100 mg by mouth daily   melatonin 3 mg  Self Yes No   Sig: Take 3 mg by mouth daily at bedtime   sertraline (ZOLOFT) 50 mg tablet  Self Yes No   Sig: Take 50 mg by mouth daily   simvastatin (ZOCOR) 40 mg tablet   No No   Sig: TAKE 1 TABLET AT BEDTIME      Facility-Administered Medications: None       Past Medical History:   Diagnosis Date    Anal fissure     Anxiety     Cervical spinal stenosis     CKD (chronic kidney disease) stage 3, GFR 30-59 ml/min ([facility])     Coronary artery disease     Degenerative joint disease of ankle and foot, unspecified laterality     Dementia ([facility])     Disc degeneration, lumbar     Diverticulosis     Erectile dysfunction of non-organic origin     Hematuria     last assessed 12    Hyperlipidemia     Hypertension     IBS (irritable bowel syndrome)     Lumbar radiculopathy     Multiple benign polyps of large intestine     benign, last assessed 12    Osteoarthritis     hip    [facility]    S/P CABG x [address] [facility]    Spondylolisthesis of lumbar region     Type 2 diabetes mellitus with diabetic neuropathy ([facility] )        Past Surgical History:   Procedure Laterality Date    CATARACT EXTRACTION Bilateral     COLONOSCOPY      COLONOSCOPY  12/2014    CORONARY ARTERY BYPASS GRAFT      1999 cabgx4    ESOPHAGOGASTRODUODENOSCOPY      [place] TOTAL HIP ARTHROPLASTY Left 2/3/2016    Procedure: ARTHROPLASTY HIP TOTAL;  Surgeon: Abdi Davies MD;  [facility];  Service: Orthopedics    TONSILLECTOMY      UMBILICAL HERNIA REPAIR      WRIST SURGERY Left        Family History   Problem Relation Age of Onset    Heart disease Mother     Dementia Mother     Heart disease Father     Heart attack Father         acute MI    Hypertension Other      I have reviewed and agree with the history as documented  E-Cigarette/Vaping    E-Cigarette Use Never User      E-Cigarette/Vaping Substances    Nicotine No     THC No     CBD No     Flavoring No     Other No     Unknown No      Social History     Tobacco Use    Smoking status: Former Smoker    Smokeless tobacco: Never Used   Substance Use Topics    Alcohol use: No    Drug use: No       Review of Systems   Constitutional: Negative for chills, diaphoresis and fever  HENT: Negative for congestion  Eyes: Negative for visual disturbance  Respiratory: Negative for cough, shortness of breath, wheezing and stridor  Cardiovascular: Negative for chest pain, palpitations and leg swelling  Gastrointestinal: Negative for abdominal pain, diarrhea, nausea and vomiting  Genitourinary: Positive for frequency  Negative for difficulty urinating, dysuria, flank pain, hematuria, penile pain, penile swelling and urgency  Musculoskeletal: Negative for neck pain  Skin: Negative for color change, pallor and rash  Neurological: Negative for dizziness, weakness, light-headedness, numbness and headaches  All other systems reviewed and are negative  Physical Exam  Physical Exam  Vitals signs and nursing note reviewed  Exam conducted with a chaperone present  Constitutional:       General: He is awake   He is not in acute distress  Appearance: He is well-developed  He is not ill-appearing, toxic-appearing or diaphoretic  HENT:      Head: Normocephalic and atraumatic  Right Ear: External ear normal       Left Ear: External ear normal       Nose: Nose normal    Eyes:      Conjunctiva/sclera: Conjunctivae normal       Pupils: Pupils are equal, round, and reactive to light  Neck:      Musculoskeletal: Normal range of motion and neck supple  Cardiovascular:      Rate and Rhythm: Normal rate and regular rhythm  Pulses: Normal pulses  Heart sounds: Normal heart sounds, S1 normal and S2 normal    Pulmonary:      Effort: Pulmonary effort is normal  No respiratory distress  Breath sounds: Normal breath sounds  No stridor  No decreased breath sounds or wheezing  Abdominal:      General: Bowel sounds are normal  There is no distension  Palpations: Abdomen is soft  Tenderness: There is no abdominal tenderness  Genitourinary:     Penis: Normal and circumcised  Scrotum/Testes:         Right: Mass, tenderness or swelling not present  Left: Mass, tenderness or swelling not present  Comments: Mild erythema noted on scrotum with no swelling or acute signs of infection  No sacral or perineal wound noted  Musculoskeletal: Normal range of motion  Right lower leg: No edema  Left lower leg: No edema  Comments: Neurovascularly intact, 5/5 strength in bilateral upper and lower extremities   Skin:     General: Skin is warm and dry  Capillary Refill: Capillary refill takes less than 2 seconds  Neurological:      General: No focal deficit present  Mental Status: He is alert and oriented to person, place, and time  Mental status is at baseline  GCS: GCS eye subscore is 4  GCS verbal subscore is 4  GCS motor subscore is 6  Psychiatric:         Behavior: Behavior is cooperative           Vital Signs  ED Triage Vitals [06/02/21 1339]   Temperature Pulse Respirations Blood Pressure SpO2   98 5 °F (36 9 °C) 80 17 (!) 193/83 98 %      Temp Source Heart Rate Source Patient Position - Orthostatic VS BP Location FiO2 (%)   Oral Monitor Lying Right arm --      Pain Score       --           Vitals:    06/02/21 1339 06/02/21 1630   BP: (!) 193/83 165/93   Pulse: 80 74   Patient Position - Orthostatic VS: Lying Lying         Visual Acuity      ED Medications  Medications - No data to display    Diagnostic Studies  Results Reviewed     Procedure Component Value Units Date/Time    Urine Microscopic [[id number]] Collected: 06/02/21 1612    Lab Status: In process Specimen: Urine, Clean Catch Updated: 06/02/21 1617    Urine Macroscopic, POC [[id number]]  (Abnormal) Collected: 06/02/21 1612    Lab Status: Final result Specimen: Urine Updated: 06/02/21 1614     Color, UA Yellow     Clarity, UA Clear     pH, UA 5 5     Leukocytes, UA Elevated glucose may cause decreased leukocyte values   See urine microscopic for [facility] result/     Nitrite, UA Negative     Protein, UA 30 (1+) mg/dl      Glucose, UA >=1000 (1%) mg/dl      Ketones, UA Negative mg/dl      Urobilinogen, UA 0 2 E U /dl      Bilirubin, UA Negative     Blood, UA Negative     Specific Gravity, UA 1 015    Narrative:      CLINITEK RESULT    Troponin I [[id number]]  (Normal) Collected: 06/02/21 1543    Lab Status: Final result Specimen: Blood from Arm, Left Updated: 06/02/21 1606     Troponin I <0 02 ng/mL     Comprehensive metabolic panel [[id number]]  (Abnormal) Collected: 06/02/21 1413    Lab Status: Final result Specimen: Blood from Arm, Left Updated: 06/02/21 1436     Sodium 139 mmol/L      Potassium 4 1 mmol/L      Chloride 99 mmol/L      CO2 30 mmol/L      ANION GAP 10 mmol/L      BUN 25 mg/dL      Creatinine 1 33 mg/dL      Glucose 335 mg/dL      Calcium 9 0 mg/dL      Corrected Calcium 10 2 mg/dL      AST 22 U/L      ALT 25 U/L      Alkaline Phosphatase 105 U/L      Total Protein 6 8 g/dL      Albumin 2 5 g/dL      Total Bilirubin 0 47 mg/dL      eGFR 48 ml/min/1 73sq m     Narrative:      [place] guidelines for Chronic Kidney Disease (CKD):     Stage 1 with normal or high GFR (GFR > 90 mL/min/1 73 square meters)    Stage 2 Mild CKD (GFR = 60-89 mL/min/1 73 square meters)    Stage 3A Moderate CKD (GFR = 45-59 mL/min/1 73 square meters)    Stage 3B Moderate CKD (GFR = 30-44 mL/min/1 73 square meters)    Stage 4 Severe CKD (GFR = 15-29 mL/min/1 73 square meters)    Stage 5 End Stage CKD (GFR <15 mL/min/1 73 square meters)  Note: GFR calculation is accurate only with a steady state creatinine    CBC and differential [[id number]]  (Abnormal) Collected: 06/02/21 1413    Lab Status: Final result Specimen: Blood from Arm, Left Updated: 06/02/21 1422     WBC 8 40 Thousand/uL      RBC 4 81 Million/uL      Hemoglobin 14 9 g/dL      Hematocrit 44 1 %      MCV 92 fL      MCH 31 0 pg      MCHC 33 8 g/dL      RDW 11 9 %      MPV 9 0 fL      Platelets 332 Thousands/uL      nRBC 0 /100 WBCs      Neutrophils Relative 76 %      Immat GRANS % 1 %      Lymphocytes Relative 9 %      Monocytes Relative 11 %      Eosinophils Relative 2 %      Basophils Relative 1 %      Neutrophils Absolute 6 43 Thousands/µL      Immature Grans Absolute 0 07 Thousand/uL      Lymphocytes Absolute 0 71 Thousands/µL      Monocytes Absolute 0 95 Thousand/µL      Eosinophils Absolute 0 20 Thousand/µL      Basophils Absolute 0 04 Thousands/µL                  No orders to display              Procedures  ECG 12 Lead Documentation Only    Date/Time: 6/2/2021 4:10 PM  Performed by: Penny Armenta PA-C  Authorized by: Penny Armenta PA-C     Indications / Diagnosis:  Weakness  ECG reviewed by me, the ED Provider: yes    Patient location:  ED  Previous ECG:     Previous ECG:  Compared to current    Comparison ECG info:  [id number]    Similarity:  No change  Rate:     ECG rate:  75    ECG rate assessment: normal    Rhythm:     Rhythm: sinus rhythm and A-V block      Rhythm comment:  1st degree  QRS:     QRS axis:  Left  ST segments:     ST segments:  Normal  T waves:     T waves: normal               ED Course  ED Course as of Jun 02 1635   Wed Jun 02, 2021   1454 Consistent with baseline   Creatinine(!): 1 33   1454 Patient has not had daily meds   Glucose, Random(!): 335   1454 Anion Gap: 10   1455 WBC: 8 40   1614 Troponin I: <0 02   1615 Nitrite, UA: Negative   1626 Patient unable to ambulate in ED  Will admit for ambulatory dysfunction  Patient and wife agreeable to plan                                              MDM    Disposition  Final diagnoses:   Generalized weakness   Ambulatory dysfunction   History of dementia   Hyperglycemia due to diabetes mellitus ([facility] )     Time reflects when diagnosis was documented in both MDM as applicable and the Disposition within this note     Time User Action Codes Description Comment    6/2/2021  4:11 PM Guilherme Cross Add [R53 1] Generalized weakness     6/2/2021  4:11 PM [place], [address] [R26 2] Ambulatory dysfunction     6/2/2021  4:31 PM Guilherme Cross Add [Z86 59] History of dementia     6/2/2021  4:35 PM Guilherme Cross Add [E11 65] Hyperglycemia due to diabetes mellitus [facility])       ED Disposition     ED Disposition Condition Date/Time Comment    Admit Stable Wed Jun 2, 2021  4:33 PM Case was discussed with Dr Kori Olvera and the patient's admission status was agreed to be Admission Status: inpatient status to the service of Dr Kori Olvera  Follow-up Information    None         Patient's Medications   Discharge Prescriptions    No medications on file     No discharge procedures on file      PDMP Review     None          ED Provider  Electronically Signed by           Erik Haskins PA-C  06/02/21 [address], PA-C  06/02/21 163

## 2021-06-02 NOTE — ASSESSMENT & PLAN NOTE
Confirm with wife that he is on Zoloft 50 mg  Consider adding gabapentin at night for sleep and to prevent delirium

## 2021-06-02 NOTE — ASSESSMENT & PLAN NOTE
· Known dementia with ambulatory dysfunction and significant caregiver fatigue  · His wife was also elderly is unable to care for him any more  · He will need placement  Consult PT, OT, case management  · Watch for delirium  · Discussed possible need for sedatives if he becomes agitated and is at risk for self-harm and harm to others    Wife is agreeable if needed

## 2021-06-02 NOTE — TELEPHONE ENCOUNTER
Patient's wife phoned to state he has dementia and is lying flat on the bed and can't move-he is also incontinent  She would like a call at [phone] as to how she should proceed

## 2021-06-02 NOTE — H&P
[address] [facility]  H&P- [address] [facility] [place] 1935, 80 y o  male MRN: [id number]  Unit/Bed#: ED 14 Encounter: [id number]  Primary Care Provider: Milton Elizabeth MD   Date and time admitted to hospital: 6/2/2021  1:36 PM    * Dementia without behavioral disturbance (HCC)  Assessment & Plan  · Known dementia with ambulatory dysfunction and significant caregiver fatigue  · His wife was also elderly is unable to care for him any more  · He will need placement  Consult PT, OT, case management  · Watch for delirium  · Discussed possible need for sedatives if he becomes agitated and is at risk for self-harm and harm to others  Wife is agreeable if needed    Type 2 diabetes mellitus with stage 3 chronic kidney disease, with long-term current use of insulin (HCC)  Assessment & Plan  Lab Results   Component Value Date    HGBA1C 9 2 (A) 10/13/2020     With hyperglycemia  Increase Lantus to 20 units at night  Add Humalog 6 units with meals  Given his age and comorbidities, this will allow higher blood sugar goal  Avoid hypoglycemia      Anxiety disorder  Assessment & Plan  Confirm with wife that he is on Zoloft 50 mg  Consider adding gabapentin at night for sleep and to prevent delirium    Essential hypertension  Assessment & Plan  · Continue losartan 100 mg daily    Mixed hyperlipidemia  Assessment & Plan  · Switch Zocor to pravastatin while in the hospital      VTE Prophylaxis: Enoxaparin (Lovenox)  / sequential compression device   Code Status:  DNR  POLST: There is no POLST form on file for this patient (pre-hospital)  Discussion with family:  Wife    Anticipated Length of Stay:  Patient will be admitted on an Inpatient basis with an anticipated length of stay of  at least 2 midnights  Justification for Hospital Stay:  Dementia    Total Time for Visit, including Counseling / Coordination of Care: 45 minutes    Greater than 50% of this total time spent on direct patient counseling and coordination of care  Chief Complaint:   Unable to care for himself    History of Present Illness:    Fer Lamas is a 80 y o  male who was brought to the hospital by his wife since she is unable to continue caring for him at home  He has known history of dementia and ambulatory dysfunction  He usually uses a walker  She said that for the past few weeks he has had progressive urinary incontinence, difficulty walking, poor sleep, and persistent memory lapses  She recalled that a few weeks ago the patient fell  EMS came to their house and fortunately he did not end up in the hospital   His son had to come and help her for the past 2 days due to increasing difficulty caring for the patient  He is a rather poor historian due to underlying dementia  When asked the wife about nursing home placement, I notice that the patient became anxious and we did not talk about this anymore  Review of Systems:    Review of Systems   Unable to perform ROS: Intubated   All other systems reviewed and are negative        Past Medical and Surgical History:     Past Medical History:   Diagnosis Date    Anal fissure     Anxiety     Cervical spinal stenosis     CKD (chronic kidney disease) stage 3, GFR 30-59 ml/min ([facility]     Coronary artery disease     Degenerative joint disease of ankle and foot, unspecified laterality     Dementia ([facility])     Disc degeneration, lumbar     Diverticulosis     Erectile dysfunction of non-organic origin     Hematuria     last assessed 8/28/12    Hyperlipidemia     Hypertension     IBS (irritable bowel syndrome)     Lumbar radiculopathy     Multiple benign polyps of large intestine     benign, last assessed 6/26/12    Osteoarthritis     hip    PSVT (paroxysmal supraventricular tachy[facility]) [facility] ) 02/2013    S[facility]P [facility]G x [facility] [facility]    Spondylolisthesis of lumbar region     Type [facility]        Past Surgical History: Procedure Laterality Date    CATARACT EXTRACTION Bilateral     COLONOSCOPY      COLONOSCOPY  12/2014    CORONARY ARTERY BYPASS GRAFT      1999 cabgx4    ESOPHAGOGASTRODUODENOSCOPY      [place] TOTAL HIP ARTHROPLASTY Left 2/3/2016    Procedure: ARTHROPLASTY HIP TOTAL;  Surgeon: Rowena Bocanegra MD;  [facility];  Service: Orthopedics    TONSILLECTOMY      UMBILICAL HERNIA REPAIR      WRIST SURGERY Left        Meds/Allergies:    Prior to Admission medications    Medication Sig Start Date End Date Taking? Authorizing Provider   acarbose (PRECOSE) 100 MG tablet Take 100 mg by mouth 3 (three) times a day with meals    Historical Provider, MD   acetaminophen (TYLENOL) 325 mg tablet 650mg by mouth every 6 hours for pain  Disp #90 Refills: 1 2/5/16   Rowena Bocanegra MD   aspirin 81 MG tablet Take 81 mg by mouth daily      Historical Provider, MD   cholecalciferol (VITAMIN D3) 1,000 units tablet Take 2,000 Units by mouth 2 (two) times a day      Historical Provider, MD   glimepiride (AMARYL) 4 mg tablet TAKE 1 TABLET TWICE A DAY 12/16/20   Gloria Witt MD   Insulin Glargine (BASAGLAR KWIKPEN SC) Inject 10 Units under the skin every morning    Historical Provider, MD   ketoconazole (NIZORAL) 2 % cream APPLY TO AFFECTED AREA TWICE A DAY ON BOTH FEET BOTTOM AND TOES 3/12/19   Historical Provider, MD   losartan (COZAAR) 100 MG tablet Take 100 mg by mouth daily 2/6/19 1/2/21  Historical Provider, MD   melatonin 3 mg Take 3 mg by mouth daily at bedtime    Historical Provider, MD   Multiple Vitamin (MULTIVITAMIN) tablet Take 1 tablet by mouth daily    Historical Provider, MD   Omega-3 Fatty Acids (FISH OIL PO) Take 1 tablet by mouth 2 (two) times a day    Historical Provider, MD   sertraline (ZOLOFT) 50 mg tablet Take 50 mg by mouth daily 8/27/18   Historical Provider, MD   simvastatin (ZOCOR) 40 mg tablet TAKE 1 TABLET AT BEDTIME 12/16/20   Gloria Witt MD     I have reviewed home medications with patient family member  Allergies: Allergies   Allergen Reactions    Actos [Pioglitazone] Other (See Comments) and Edema     edema    Buspar [Buspirone] Other (See Comments)     confusion    Citalopram     Paroxetine     Sertraline Other (See Comments)     Pt's wife reports not an allergy    Lamisil [Terbinafine] Rash    Terbinafine Rash    Wound Dressings Rash       Social History:     Marital Status: /Civil Union   Occupation:  None  Patient Pre-hospital Living Situation:  Lives with wife  Patient Pre-hospital Level of Mobility:  Previously used walker/cane  Patient Pre-hospital Diet Restrictions:  Diabetic  Substance Use History:   Social History     Substance and Sexual Activity   Alcohol Use No     Social History     Tobacco Use   Smoking Status Former Smoker   Smokeless Tobacco Never Used     Social History     Substance and Sexual Activity   Drug Use No       Family History:    Family History   Problem Relation Age of Onset    Heart disease Mother     Dementia Mother     Heart disease Father     Heart attack Father         acute MI    Hypertension Other        Physical Exam:     Vitals:   Blood Pressure: 165/93 (06/02/21 1630)  Pulse: 74 (06/02/21 1630)  Temperature: 98 5 °F (36 9 °C) (06/02/21 1339)  Temp Source: Oral (06/02/21 1339)  Respirations: 18 (06/02/21 1630)  SpO2: 99 % (06/02/21 1630)    Physical Exam  Vitals signs reviewed  Constitutional:       Appearance: He is not ill-appearing or diaphoretic  HENT:      Head: Normocephalic and atraumatic  Nose: No rhinorrhea  Eyes:      General: No scleral icterus  Neck:      Musculoskeletal: Neck supple  Cardiovascular:      Rate and Rhythm: Regular rhythm  Heart sounds: No murmur  No gallop  Pulmonary:      Effort: Pulmonary effort is normal  No respiratory distress  Breath sounds: No wheezing or rales  Abdominal:      General: Abdomen is flat  Palpations: Abdomen is soft     Musculoskeletal: Right lower leg: No edema  Left lower leg: No edema  Skin:     General: Skin is warm and dry  Neurological:      Mental Status: He is alert  Comments: Oriented to self  Follow simple commands  Deconditioned  Required assistance when sitting up from the bed   Psychiatric:         Mood and Affect: Mood normal       Comments: Flat     Lab Results: I have personally reviewed pertinent reports  Results from last 7 days   Lab Units 06/02/21  1413   WBC Thousand/uL 8 40   HEMOGLOBIN g/dL 14 9   HEMATOCRIT % 44 1   PLATELETS Thousands/uL 275   NEUTROS PCT % 76*   LYMPHS PCT % 9*   MONOS PCT % 11   EOS PCT % 2     Results from last 7 days   Lab Units 06/02/21  1413   SODIUM mmol/L 139   POTASSIUM mmol/L 4 1   CHLORIDE mmol/L 99*   CO2 mmol/L 30   BUN mg/dL 25   CREATININE mg/dL 1 33*   ANION GAP mmol/L 10   CALCIUM mg/dL 9 0   ALBUMIN g/dL 2 5*   TOTAL BILIRUBIN mg/dL 0 47   ALK PHOS U/L 105   ALT U/L 25   AST U/L 22   GLUCOSE RANDOM mg/dL 335*                       Imaging:  None  No orders to display       EKG, Pathology, and Other Studies Reviewed on Admission:   · EKG:  Sinus tachycardia with PACs    Allscripts / Epic Records Reviewed: Yes     ** Please Note: This note has been constructed using a voice recognition system   **

## 2021-06-03 LAB
ANION GAP SERPL CALCULATED.3IONS-SCNC: 8 MMOL/L (ref 4–13)
BUN SERPL-MCNC: 29 MG/DL (ref 5–25)
CALCIUM SERPL-MCNC: 9.2 MG/DL (ref 8.3–10.1)
CHLORIDE SERPL-SCNC: 104 MMOL/L (ref 100–108)
CO2 SERPL-SCNC: 29 MMOL/L (ref 21–32)
CREAT SERPL-MCNC: 1.25 MG/DL (ref 0.6–1.3)
ERYTHROCYTE [DISTWIDTH] IN BLOOD BY AUTOMATED COUNT: 11.9 % (ref 11.6–15.1)
GFR SERPL CREATININE-BSD FRML MDRD: 52 ML/MIN/1.73SQ M
GLUCOSE SERPL-MCNC: 124 MG/DL (ref 65–140)
GLUCOSE SERPL-MCNC: 165 MG/DL (ref 65–140)
GLUCOSE SERPL-MCNC: 198 MG/DL (ref 65–140)
GLUCOSE SERPL-MCNC: 221 MG/DL (ref 65–140)
GLUCOSE SERPL-MCNC: 68 MG/DL (ref 65–140)
GLUCOSE SERPL-MCNC: 71 MG/DL (ref 65–140)
HCT VFR BLD AUTO: 45.7 % (ref 36.5–49.3)
HGB BLD-MCNC: 15.1 G/DL (ref 12–17)
MCH RBC QN AUTO: 30.6 PG (ref 26.8–34.3)
MCHC RBC AUTO-ENTMCNC: 33 G/DL (ref 31.4–37.4)
MCV RBC AUTO: 93 FL (ref 82–98)
PLATELET # BLD AUTO: 258 THOUSANDS/UL (ref 149–390)
PMV BLD AUTO: 9 FL (ref 8.9–12.7)
POTASSIUM SERPL-SCNC: 3.9 MMOL/L (ref 3.5–5.3)
RBC # BLD AUTO: 4.94 MILLION/UL (ref 3.88–5.62)
SODIUM SERPL-SCNC: 141 MMOL/L (ref 136–145)
WBC # BLD AUTO: 9.76 THOUSAND/UL (ref 4.31–10.16)

## 2021-06-03 PROCEDURE — 97167 OT EVAL HIGH COMPLEX 60 MIN: CPT

## 2021-06-03 PROCEDURE — 97530 THERAPEUTIC ACTIVITIES: CPT

## 2021-06-03 PROCEDURE — 80048 BASIC METABOLIC PNL TOTAL CA: CPT | Performed by: INTERNAL MEDICINE

## 2021-06-03 PROCEDURE — 99232 SBSQ HOSP IP/OBS MODERATE 35: CPT | Performed by: INTERNAL MEDICINE

## 2021-06-03 PROCEDURE — 85027 COMPLETE CBC AUTOMATED: CPT | Performed by: INTERNAL MEDICINE

## 2021-06-03 PROCEDURE — 97163 PT EVAL HIGH COMPLEX 45 MIN: CPT

## 2021-06-03 PROCEDURE — 82948 REAGENT STRIP/BLOOD GLUCOSE: CPT

## 2021-06-03 RX ADMIN — INSULIN LISPRO 6 UNITS: 100 INJECTION, SOLUTION INTRAVENOUS; SUBCUTANEOUS at 18:11

## 2021-06-03 RX ADMIN — MELATONIN 3 MG: at 21:47

## 2021-06-03 RX ADMIN — ASPIRIN 81 MG: 81 TABLET, CHEWABLE ORAL at 08:07

## 2021-06-03 RX ADMIN — PRAVASTATIN SODIUM 40 MG: 40 TABLET ORAL at 18:12

## 2021-06-03 RX ADMIN — INSULIN LISPRO 2 UNITS: 100 INJECTION, SOLUTION INTRAVENOUS; SUBCUTANEOUS at 18:12

## 2021-06-03 RX ADMIN — NYSTATIN: 100000 POWDER TOPICAL at 08:12

## 2021-06-03 RX ADMIN — NYSTATIN: 100000 POWDER TOPICAL at 18:13

## 2021-06-03 RX ADMIN — INSULIN LISPRO 6 UNITS: 100 INJECTION, SOLUTION INTRAVENOUS; SUBCUTANEOUS at 12:21

## 2021-06-03 RX ADMIN — INSULIN LISPRO 6 UNITS: 100 INJECTION, SOLUTION INTRAVENOUS; SUBCUTANEOUS at 08:07

## 2021-06-03 RX ADMIN — ENOXAPARIN SODIUM 40 MG: 40 INJECTION SUBCUTANEOUS at 08:07

## 2021-06-03 RX ADMIN — INSULIN GLARGINE 20 UNITS: 100 INJECTION, SOLUTION SUBCUTANEOUS at 21:47

## 2021-06-03 RX ADMIN — INSULIN LISPRO 1 UNITS: 100 INJECTION, SOLUTION INTRAVENOUS; SUBCUTANEOUS at 12:21

## 2021-06-03 RX ADMIN — SERTRALINE HYDROCHLORIDE 50 MG: 50 TABLET ORAL at 08:07

## 2021-06-03 RX ADMIN — LOSARTAN POTASSIUM 100 MG: 50 TABLET, FILM COATED ORAL at 08:07

## 2021-06-03 NOTE — UTILIZATION REVIEW
Initial Clinical Review    Admission: Date/Time/Statement:   Admission Orders (From admission, onward)     Ordered        06/02/21 1634  Inpatient Admission  Once                   Orders Placed This Encounter   Procedures    Inpatient Admission     Standing Status:   Standing     Number of Occurrences:   1     Order Specific Question:   Level of Care     Answer:   Med Surg [16]     Order Specific Question:   Estimated length of stay     Answer:   More than 2 Midnights     Order Specific Question:   Certification     Answer:   I certify that inpatient services are medically necessary for this patient for a duration of greater than two midnights  See H&P and MD Progress Notes for additional information about the patient's course of treatment  ED Arrival Information     Expected Arrival Acuity Means of Arrival Escorted By Service Admission Type    6/2/2021 12:51 6/2/2021 13:36 Urgent Ambulance [facility] ([address]) General Medicine Urgent    Arrival Complaint    weakness        Chief Complaint   Patient presents with    Weakness - Generalized     Pt rpresents from home where family told EMS pt has had worsening weakness and incontinence  PCP told family to bring patient to ED  Patient has no complaints  Pre-hospital bgl was 488  Initial Presentation: 81 yo male with hx DM,anxiety, dementia and ambulatory dysfunction presents to ED from home via EMS with progressive urinary incontinence, difficulty walking, poor sleep, persistent memory lapses with spouse unable to care for him at home  Pts last fall was a few weeks ago  pt became anxious when physician asked wife about nursing home placement  On exam, pt oriented to self, follows simple commands, deconditioned, required assist when sitting up from bed , affect flat labd show elevated glucose  Pt admitted as inpatient with dementia without behavioral disturbance   Plan - pt will need placement with case mgmt consult, PT/OT evals, monitor for delirium, sedatives if becomes agitated and is at risk for self-harm and harm to others, increase  Lantus to 20 U at night, add humalog 6 U with meals,Consider adding gabapentin at night for sleep and to prevent delirium  Pt with stage 2 sacral wound, wound care nurse consulted  Date: 6/3 Day 2:   PT/OT recommending rehab  Pt limited by cognition impacting processing/recall and impacting safety awareness per PT  Pt requires moderate assist 1-2 people to safely transfer  Pt pleasantly confused, mood stable  Will continue sertraline    ED Triage Vitals   Temperature Pulse Respirations Blood Pressure SpO2   06/02/21 1339 06/02/21 1339 06/02/21 1339 06/02/21 1339 06/02/21 1339   98 5 °F (36 9 °C) 80 17 (!) 193/83 98 %      Temp Source Heart Rate Source Patient Position - Orthostatic VS BP Location FiO2 (%)   06/02/21 1339 06/02/21 1339 06/02/21 1339 06/02/21 1339 --   Oral Monitor Lying Right arm       Pain Score       06/02/21 2055       No Pain          Wt Readings from Last 1 Encounters:   01/02/21 79 kg (174 lb 2 6 oz)     Additional Vital Signs:   Date/Time  Temp  Pulse  Resp  BP  SpO2    06/03/21 0730  97 4 °F (36 3 °C)Abnormal   65  18  149/87  97 %    06/02/21 2300  97 5 °F (36 4 °C)  88  16  120/60  96 %    06/02/21 2055  98 5 °F (36 9 °C)  76  17  108/64  98 %    06/02/21 2003  --  80  16  135/76  98 %    06/02/21 1630  --  74  18  165/93  99 %        Pertinent Labs/Diagnostic Test Results:   6/2 ECG-   Sinus rhythm with 1st degree A-V block with Premature atrial complexes  Left axis deviation  Minimal voltage criteria for LVH, may be normal variant  Results from last 7 days   Lab Units 06/03/21  0503 06/02/21  1413   WBC Thousand/uL 9 76 8 40   HEMOGLOBIN g/dL 15 1 14 9   HEMATOCRIT % 45 7 44 1   PLATELETS Thousands/uL 258 275   NEUTROS ABS Thousands/µL  --  6 43         Results from last 7 days   Lab Units 06/03/21  0503 06/02/21  1413   SODIUM mmol/L 141 139   POTASSIUM mmol/L 3 9 4 1   CHLORIDE mmol/L 104 99*   CO2 mmol/L 29 30   ANION GAP mmol/L 8 10   BUN mg/dL 29* 25   CREATININE mg/dL 1 25 1 33*   EGFR ml/min/1 73sq m 52 48   CALCIUM mg/dL 9 2 9 0     Results from last 7 days   Lab Units 06/02/21  1413   AST U/L 22   ALT U/L 25   ALK PHOS U/L 105   TOTAL PROTEIN g/dL 6 8   ALBUMIN g/dL 2 5*   TOTAL BILIRUBIN mg/dL 0 47     Results from last 7 days   Lab Units 06/03/21  1137 06/03/21  0652 06/02/21  2114 06/02/21  1927   POC GLUCOSE mg/dl 165* 124 139 231*     Results from last 7 days   Lab Units 06/03/21  0503 06/02/21  1413   GLUCOSE RANDOM mg/dL 71 335*           Results from last 7 days   Lab Units 06/02/21  1543   TROPONIN I ng/mL <0 02           Results from last 7 days   Lab Units 06/02/21  1612   CLARITY UA  Clear   COLOR UA  Yellow   SPEC GRAV UA  1 015   PH UA  5 5   GLUCOSE UA mg/dl >=1000 (1%)*   KETONES UA mg/dl Negative   BLOOD UA  Negative   PROTEIN UA mg/dl 30 (1+)*   NITRITE UA  Negative   BILIRUBIN UA  Negative   UROBILINOGEN UA E U /dl 0 2   LEUKOCYTES UA  Elevated glucose may cause decreased leukocyte values   See urine microscopic for [facility] result/*   WBC UA /hpf 0-1*   RBC UA /hpf 0-1*   BACTERIA UA /hpf None Seen   EPITHELIAL CELLS WET PREP /hpf None Seen             ED Treatment:   Medication Administration from 06/02/2021 1251 to 06/02/2021 2024       Date/Time Order Dose Route Action     06/02/2021 1935 insulin lispro (HumaLOG) 100 units/mL subcutaneous injection 6 Units 6 Units Subcutaneous Given     06/02/2021 1937 insulin lispro (HumaLOG) 100 units/mL subcutaneous injection 1-5 Units 2 Units Subcutaneous Given        Past Medical History:   Diagnosis Date    Anal fissure     Anxiety     Cervical spinal stenosis     CKD (chronic kidney disease) stage 3, GFR 30-59 ml/min [facility]     Coronary artery disease     Degenerative joint disease of ankle and foot, unspecified laterality     Dementia ([facility])     Disc degeneration, lumbar     Diverticulosis     Erectile dysfunction of non-organic origin     Hematuria     last assessed 8/28/12    Hyperlipidemia     Hypertension     IBS (irritable bowel syndrome)     Lumbar radiculopathy     Multiple benign polyps of large intestine     benign, last assessed 6/26/12    Osteoarthritis     hip    PSVT (paroxysmal supraventricular tachycardia) ([facility] ) 02/2013    S/P CABG x [facility]    Spondylolisthesis of lumbar region     Type 2 diabetes mellitus with diabetic neuropathy ([facility] )      Present on Admission:   Dementia without behavioral disturbance ([facility] )   Mixed hyperlipidemia   Essential hypertension   Anxiety disorder      Admitting Diagnosis: Weakness [R53 1]  Generalized weakness [R53 1]  Ambulatory dysfunction [R26 2]  History of dementia [Z86 59]  Hyperglycemia due to diabetes mellitus ([facility] ) [E11 65]  Age/Sex: 80 y o  male  Admission Orders:  Scheduled Medications:  aspirin, 81 mg, Oral, Daily  enoxaparin, 40 mg, Subcutaneous, Daily  insulin glargine, 20 Units, Subcutaneous, HS  insulin lispro, 1-5 Units, Subcutaneous, TID AC  insulin lispro, 6 Units, Subcutaneous, Daily With Breakfast  insulin lispro, 6 Units, Subcutaneous, Daily With Lunch  insulin lispro, 6 Units, Subcutaneous, Daily With Dinner  losartan, 100 mg, Oral, Daily  melatonin, 3 mg, Oral, HS  nystatin, , Topical, BID  pravastatin, 40 mg, Oral, Daily With Dinner  sertraline, 50 mg, Oral, Daily      Continuous IV Infusions:     PRN Meds:     poct glucose TID  Level 2 carb diet with nutrition supplements  Wound care nurse consult    IP CONSULT TO CASE MANAGEMENT    Network Utilization Review Department  ATTENTION: Please call with any questions or concerns to [phone] and carefully listen to the prompts so that you are directed to the right person   All voicemails are confidential   Hannah Rodriguez all requests for admission clinical reviews, approved or denied determinations and any other requests to dedicated fax number below belonging to the campus where the patient is receiving treatment   List of dedicated fax numbers for the Facilities:  [address] [place] [address] DENIALS (Administrative/Medical Necessity) [phone]   [address] (Maternity/NICU/Pediatrics) [phone] [address] Yanira Elliott 9643 [id number] [place] [address] [place] [address] [phone]

## 2021-06-03 NOTE — PLAN OF CARE
Problem: OCCUPATIONAL THERAPY ADULT  Goal: Performs self-care activities at highest level of function for planned discharge setting  See evaluation for individualized goals  Description: Treatment Interventions: ADL retraining, Functional transfer training, UE strengthening/ROM, Cognitive reorientation, Endurance training, Patient/family training, Equipment evaluation/education, Compensatory technique education, Energy conservation, Activityengagement          See flowsheet documentation for full assessment, interventions and recommendations  Note: Limitation: Decreased ADL status, Decreased cognition, Decreased UE strength, Decreased Safe judgement during ADL, Decreased endurance, Decreased self-care trans, Decreased high-level ADLs  Prognosis: Good  Assessment: Pt is a 80 y o  male seen for OT evaluation s/p admit to [facility] on 6/2/2021 w/ ambulatory dysfunction and caregiver difficulty assisting w/ patient at home due to weakness and impaired balance and recent fall  Comorbidities affecting pt's functional performance at time of assessment include: DM II, anxiety disorder, HTN, hyperlipidemia, dementia  Personal factors affecting pt at time of IE include:pt spouse expresses increased difficulty in caring for patient at home as pt requiring increased physical assist for tasks and decline in ADL and functional transfer performance over past few months  Prior to admission, pt was living w/ spouse and spouse reports setup self-feeding, setup-min assist UB ADLs, assist w/ LB ADLs, assist w/ toileting and incontinence, wife does IADLs and medication management   Upon evaluation: Pt requires supervision w/ increased time, MOD assist x2 sit>stand from bed w/ increased time and moderate VCs and tactile cues for hand placement, MIN assist x1 functional mobility w/ RW, MOD assist x1 sit<>stand from toilet w/ BSC over top w/ VCs for hand placement and mod cues for controlled descent, MIN assist steadying at sink for grooming of washing hands, MAX assist toileting w/ clothing management, MOD assist x2 stand>sit in chair w/ delayed processing and moderate cues for hand placement 2* the following deficits impacting occupational performance: decreased strength and endurance, impaired balance, impaired activity tolerance, delayed processing, fall risk, impaired cognition, impaired functional reach, requires simple one step commands, decreased FM coordination, visual deficits  Pt to benefit from continued skilled OT tx while in the hospital to address deficits as defined above and maximize level of functional independence w ADL's and functional mobility  Occupational Performance areas to address include: grooming, bathing/shower, toilet hygiene, dressing, health maintenance, functional mobility, clothing management, cleaning and meal prep, formal cognitive assessment  From OT standpoint, recommendation at time of d/c would be short term rehab  The patient's raw score on the AM-PAC Daily Activity inpatient short form is 14, standardized score is 33 39, less than 39 4  Patients at this level are likely to benefit from discharge to post-acute rehabilitation services  Please refer to the recommendation of the Occupational Therapist for safe discharge planning    Recommendation: Geriatric Consult  OT Discharge Recommendation: Post acute rehabilitation services(w/ potential increased in care/supportive environment)  OT - OK to Discharge: (to rehab when medically stable)

## 2021-06-03 NOTE — PLAN OF CARE
Problem: Potential for Falls  Goal: Patient will remain free of falls  Description: INTERVENTIONS:  - Assess patient frequently for physical needs  -  Identify cognitive and physical deficits and behaviors that affect risk of falls  -  [place] fall precautions as indicated by assessment   - Educate patient/family on patient safety including physical limitations  - Instruct patient to call for assistance with activity based on assessment  - Modify environment to reduce risk of injury  - Consider OT/PT consult to assist with strengthening/mobility  Outcome: Progressing     Problem: Nutrition/Hydration-ADULT  Goal: Nutrient/Hydration intake appropriate for improving, restoring or maintaining nutritional needs  Description: Monitor and assess patient's nutrition/hydration status for malnutrition  Collaborate with interdisciplinary team and initiate plan and interventions as ordered  Monitor patient's weight and dietary intake as ordered or per policy  Utilize nutrition screening tool and intervene as necessary  Determine patient's food preferences and provide high-protein, high-caloric foods as appropriate       INTERVENTIONS:  - Monitor oral intake, urinary output, labs, and treatment plans  - Assess nutrition and hydration status and recommend course of action  - Evaluate amount of meals eaten  - Assist patient with eating if necessary   - Allow adequate time for meals  - Recommend/ encourage appropriate diets, oral nutritional supplements, and vitamin/mineral supplements  - Order, calculate, and assess calorie counts as needed  - Recommend, monitor, and adjust tube feedings and TPN/PPN based on assessed needs  - Assess need for intravenous fluids  - Provide specific nutrition/hydration education as appropriate  - Include patient/family/caregiver in decisions related to nutrition  Outcome: Progressing     Problem: Prexisting or High Potential for Compromised Skin Integrity  Goal: Skin integrity is maintained or improved  Description: INTERVENTIONS:  - Identify patients at risk for skin breakdown  - Assess and monitor skin integrity  - Assess and monitor nutrition and hydration status  - Monitor labs   - Assess for incontinence   - Turn and reposition patient  - Assist with mobility/ambulation  - Relieve pressure over bony prominences  - Avoid friction and shearing  - Provide appropriate hygiene as needed including keeping skin clean and dry  - Evaluate need for skin moisturizer/barrier cream  - Collaborate with interdisciplinary team   - Patient/family teaching  - Consider wound care consult   Outcome: Progressing     Problem: PAIN - ADULT  Goal: Verbalizes/displays adequate comfort level or baseline comfort level  Description: Interventions:  - Encourage patient to monitor pain and request assistance  - Assess pain using appropriate pain scale  - Administer analgesics based on type and severity of pain and evaluate response  - Implement non-pharmacological measures as appropriate and evaluate response  - Consider cultural and social influences on pain and pain management  - Notify physician/advanced practitioner if interventions unsuccessful or patient reports new pain  Outcome: Progressing     Problem: INFECTION - ADULT  Goal: Absence or prevention of progression during hospitalization  Description: INTERVENTIONS:  - Assess and monitor for signs and symptoms of infection  - Monitor lab/diagnostic results  - Monitor all insertion sites, i e  indwelling lines, tubes, and drains  - Monitor endotracheal if appropriate and nasal secretions for changes in amount and color  - Lequire appropriate cooling/warming therapies per order  - Administer medications as ordered  - Instruct and encourage patient and family to use good hand hygiene technique  - Identify and instruct in appropriate isolation precautions for identified infection/condition  Outcome: Progressing  Goal: Absence of fever/infection during neutropenic period  Description: INTERVENTIONS:  - Monitor WBC    Outcome: Progressing     Problem: SAFETY ADULT  Goal: Patient will remain free of falls  Description: INTERVENTIONS:  - Assess patient frequently for physical needs  -  Identify cognitive and physical deficits and behaviors that affect risk of falls    -  [place] fall precautions as indicated by assessment   - Educate patient/family on patient safety including physical limitations  - Instruct patient to call for assistance with activity based on assessment  - Modify environment to reduce risk of injury  - Consider OT/PT consult to assist with strengthening/mobility  Outcome: Progressing  Goal: Maintain or return to baseline ADL function  Description: INTERVENTIONS:  -  Assess patient's ability to carry out ADLs; assess patient's baseline for ADL function and identify physical deficits which impact ability to perform ADLs (bathing, care of mouth/teeth, toileting, grooming, dressing, etc )  - Assess/evaluate cause of self-care deficits   - Assess range of motion  - Assess patient's mobility; develop plan if impaired  - Assess patient's need for assistive devices and provide as appropriate  - Encourage maximum independence but intervene and supervise when necessary  - Involve family in performance of ADLs  - Assess for home care needs following discharge   - Consider OT consult to assist with ADL evaluation and planning for discharge  - Provide patient education as appropriate  Outcome: Progressing  Goal: Maintain or return mobility status to optimal level  Description: INTERVENTIONS:  - Assess patient's baseline mobility status (ambulation, transfers, stairs, etc )    - Identify cognitive and physical deficits and behaviors that affect mobility  - Identify mobility aids required to assist with transfers and/or ambulation (gait belt, sit-to-stand, lift, walker, cane, etc )  - [place] fall precautions as indicated by assessment  - Record patient progress and toleration of activity level on Mobility SBAR; progress patient to next Phase/Stage  - Instruct patient to call for assistance with activity based on assessment  - Consider rehabilitation consult to assist with strengthening/weightbearing, etc   Outcome: Progressing     Problem: DISCHARGE PLANNING  Goal: Discharge to home or other facility with appropriate resources  Description: INTERVENTIONS:  - Identify barriers to discharge w/patient and caregiver  - Arrange for needed discharge resources and transportation as appropriate  - Identify discharge learning needs (meds, wound care, etc )  - Arrange for interpretive services to assist at discharge as needed  - Refer to Case Management Department for coordinating discharge planning if the patient needs post-hospital services based on physician/advanced practitioner order or complex needs related to functional status, cognitive ability, or social support system  Outcome: Progressing     Problem: Knowledge Deficit  Goal: Patient/family/caregiver demonstrates understanding of disease process, treatment plan, medications, and discharge instructions  Description: Complete learning assessment and assess knowledge base    Interventions:  - Provide teaching at level of understanding  - Provide teaching via preferred learning methods  Outcome: Progressing     Problem: NEUROSENSORY - ADULT  Goal: Achieves stable or improved neurological status  Description: INTERVENTIONS  - Monitor and report changes in neurological status  - Monitor vital signs such as temperature, blood pressure, glucose, and any other labs ordered   - Initiate measures to prevent increased intracranial pressure  - Monitor for seizure activity and implement precautions if appropriate      Outcome: Progressing  Goal: Remains free of injury related to seizures activity  Description: INTERVENTIONS  - Maintain airway, patient safety  and administer oxygen as ordered  - Monitor patient for seizure activity, document and report duration and description of seizure to physician/advanced practitioner  - If seizure occurs,  ensure patient safety during seizure  - Reorient patient post seizure  - Seizure pads on all 4 side rails  - Instruct patient/family to notify RN of any seizure activity including if an aura is experienced  - Instruct patient/family to call for assistance with activity based on nursing assessment  - Administer anti-seizure medications if ordered    Outcome: Progressing  Goal: Achieves maximal functionality and self care  Description: INTERVENTIONS  - Monitor swallowing and airway patency with patient fatigue and changes in neurological status  - Encourage and assist patient to increase activity and self care     - Encourage visually impaired, hearing impaired and aphasic patients to use assistive/communication devices  Outcome: Progressing     Problem: METABOLIC, FLUID AND ELECTROLYTES - ADULT  Goal: Electrolytes maintained within normal limits  Description: INTERVENTIONS:  - Monitor labs and assess patient for signs and symptoms of electrolyte imbalances  - Administer electrolyte replacement as ordered  - Monitor response to electrolyte replacements, including repeat lab results as appropriate  - Instruct patient on fluid and nutrition as appropriate  Outcome: Progressing  Goal: Fluid balance maintained  Description: INTERVENTIONS:  - Monitor labs   - Monitor I/O and WT  - Instruct patient on fluid and nutrition as appropriate  - Assess for signs & symptoms of volume excess or deficit  Outcome: Progressing  Goal: Glucose maintained within target range  Description: INTERVENTIONS:  - Monitor Blood Glucose as ordered  - Assess for signs and symptoms of hyperglycemia and hypoglycemia  - Administer ordered medications to maintain glucose within target range  - Assess nutritional intake and initiate nutrition service referral as needed  Outcome: Progressing     Problem: SKIN/TISSUE INTEGRITY - ADULT  Goal: Skin integrity remains intact  Description: INTERVENTIONS  - Identify patients at risk for skin breakdown  - Assess and monitor skin integrity  - Assess and monitor nutrition and hydration status  - Monitor labs (i e  albumin)  - Assess for incontinence   - Turn and reposition patient  - Assist with mobility/ambulation  - Relieve pressure over bony prominences  - Avoid friction and shearing  - Provide appropriate hygiene as needed including keeping skin clean and dry  - Evaluate need for skin moisturizer/barrier cream  - Collaborate with interdisciplinary team (i e  Nutrition, Rehabilitation, etc )   - Patient/family teaching  Outcome: Progressing  Goal: Incision(s), wounds(s) or drain site(s) healing without S/S of infection  Description: INTERVENTIONS  - Assess and document risk factors for skin impairment   - Assess and document dressing, incision, wound bed, drain sites and surrounding tissue  - Consider nutrition services referral as needed  - Oral mucous membranes remain intact  - Provide patient/ family education  Outcome: Progressing  Goal: Oral mucous membranes remain intact  Description: INTERVENTIONS  - Assess oral mucosa and hygiene practices  - Implement preventative oral hygiene regimen  - Implement oral medicated treatments as ordered  - Initiate Nutrition services referral as needed  Outcome: Progressing     Problem: MUSCULOSKELETAL - ADULT  Goal: Maintain or return mobility to safest level of function  Description: INTERVENTIONS:  - Assess patient's ability to carry out ADLs; assess patient's baseline for ADL function and identify physical deficits which impact ability to perform ADLs (bathing, care of mouth/teeth, toileting, grooming, dressing, etc )  - Assess/evaluate cause of self-care deficits   - Assess range of motion  - Assess patient's mobility  - Assess patient's need for assistive devices and provide as appropriate  - Encourage maximum independence but intervene and supervise when necessary  - Involve family in performance of ADLs  - Assess for home care needs following discharge   - Consider OT consult to assist with ADL evaluation and planning for discharge  - Provide patient education as appropriate  Outcome: Progressing  Goal: Maintain proper alignment of affected body part  Description: INTERVENTIONS:  - Support, maintain and protect limb and body alignment  - Provide patient/ family with appropriate education  Outcome: Progressing

## 2021-06-03 NOTE — OCCUPATIONAL THERAPY NOTE
Occupational Therapy Evaluation     Patient Name: Alfred Saenz  EYPJD'S Date: 6/3/2021  Problem List  Principal Problem:    Dementia without behavioral disturbance ([facility] )  Active Problems:    Mixed hyperlipidemia    Essential hypertension    Anxiety disorder    Type 2 diabetes mellitus with stage 3 chronic kidney disease, with long-term current use of insulin ([facility] )    Past Medical History  Past Medical History:   Diagnosis Date    Anal fissure     Anxiety     Cervical spinal stenosis     CKD (chronic kidney disease) stage 3, GFR 30-59 ml/min ([facility])     Coronary artery disease     Degenerative joint disease of ankle and foot, unspecified laterality     Dementia ([facility] )     Disc degeneration, lumbar     Diverticulosis     Erectile dysfunction of non-organic origin     Hematuria     last assessed 8/28/12    Hyperlipidemia     Hypertension     IBS (irritable bowel syndrome)     Lumbar radiculopathy     Multiple benign polyps of large intestine     benign, last assessed 6/26/12    Osteoarthritis     hip    PSVT (paroxysmal supraventricular tachycardia) ([place] [id number] ) 02/2013    S/P CABG x [address] [facility]    Spondylolisthesis of lumbar region     Type 2 diabetes mellitus with diabetic neuropathy ([place] [address] )      Past Surgical History  Past Surgical History:   Procedure Laterality Date    CATARACT EXTRACTION Bilateral     COLONOSCOPY      COLONOSCOPY  12/2014    CORONARY ARTERY BYPASS GRAFT      1999 cabgx4    ESOPHAGOGASTRODUODENOSCOPY      WA TOTAL HIP ARTHROPLASTY Left 2/3/2016    Procedure: ARTHROPLASTY HIP TOTAL;  Surgeon: Grayson Lamar MD;  [facility]: [facility];  Service: Orthopedics    TONSILLECTOMY      UMBILICAL HERNIA REPAIR      WRIST SURGERY Left              06/03/21 1002   OT Last Visit   OT Visit Date 06/03/21   Note Type   Note type Evaluation   Restrictions/Precautions   Weight Bearing Precautions Per Order No   Other Precautions Chair Alarm;Cognitive; Bed Alarm; Fall Risk;Pain;Multiple lines;Telemetry   Pain Assessment   Pain Assessment Tool 0-10   Pain Score No Pain   Home Living   Type of Home Apartment   Home Layout One level   Bathroom Shower/Tub Walk-in shower   Bathroom Toilet Standard   Bathroom Equipment Grab bars in shower; Shower chair   [address] [place] [address]   Additional Comments h/o provided by spouse as pt w/ dementia and unable to provide informationpt lives w/ spouse, spouse is his caretaker and assists him at home, pt w/ decline in functioning over past few months per wife they were self-isolating for over a year due to [place] until they became vaccinated, and his 3 children visited him a few months ago and then w/ increased decline in functioning   Prior Function   Level of [place] Needs assistance with IADLs; Needs assistance with ADLs and functional mobility   Lives With Spouse   Receives Help From Family   ADL Assistance Needs assistance  (setup UB ADLs)   IADLs Needs assistance   Falls in the last 6 months 1 to 4   Vocational Retired   Comments pt can perform self-feeding and grooming, setup-assist w/ UB ADLs, assist w/ LB ADLS, assist toileting (incontinent of urine and bowel more frequently at home), pt wife reports she is unable to physically assist pt w/ transfers and functional mobility as she uses a quad cane herself and reports increased difficulty w/ caring for patient despite diligent efforts   Lifestyle   Autonomy per pt setup w/ self-feeding, setup-min assist UB ADLs, assist w/ LB ADLs and bathing, assist w/ IADL from spouse   Reciprocal Relationships spouse   Service to Others retired   Intrinsic Gratification watching tv   [address] "I'm okay"   ADL   Where Assessed Chair   Eating Assistance 5  Supervision/Setup   Grooming Assistance 4  Minimal Assistance   Grooming Deficit Setup;Steadying; Increased time to complete;Verbal cueing;Supervision/safety; Wash/dry hands   UB Bathing Assistance 3 Moderate Assistance   LB Bathing Assistance 2  Maximal Assistance   UB Dressing Assistance 3  Moderate Assistance   LB Dressing Assistance 2  Maximal [address]  2  Maximal Assistance   Toileting Deficit Setup;Steadying;Supervison/safety;Verbal cueing; Increased time to complete;Clothing management up;Clothing management down;Grab bar use;Bedside commode  (BSC over toilet)   Functional Assistance 3  Moderate Assistance   Bed Mobility   Supine to Sit 5  Supervision   Additional items Assist x 1;HOB elevated; Bedrails; Increased time required;Verbal cues   Additional Comments increased time and VCs   Transfers   Sit to Stand 3  Moderate assistance   Additional items Assist x 2; Increased time required;Verbal cues; Bedrails  (initially from bed MOD assist x2 )   Stand to Sit 3  Moderate assistance   Additional items Assist x 2; Increased time required;Verbal cues;Armrests  (max verbal and tactile cues for descent )   Toilet transfer 3  Moderate assistance   Additional items Assist x 1; Increased time required;Verbal cues; Commode  (BSC over toilet w/ grab bar use)   Additional Comments pt w/ delayed processing w/ stand>sit transfers requiring increased VC and tactile cues and for hand placement; alarm intact   Functional Mobility   Functional Mobility 4  Minimal assistance   Additional Comments assist x1 w/ increased time to complete   Additional items Rolling walker   Balance   Static Sitting Fair +   Dynamic Sitting Fair   Static Standing Fair -   Dynamic Standing Poor +   Ambulatory Poor +   Activity Tolerance   Activity Tolerance Patient limited by fatigue;Treatment limited secondary to medical complications (Comment)   Nurse Made Aware appropriate to see per Vivi TILLMAN Assessment   RUE Assessment WFL  (4-/5)   LUE Assessment   LUE Assessment WFL  (4-/5)   Hand Function   Gross Motor Coordination Functional   Fine Motor Coordination Impaired   Sensation   Light Touch No apparent deficits Proprioception   Proprioception Partial deficits in the RUE;Partial deficits in the LUE   Vision-Basic Assessment   Current Vision Wears glasses all the time   Vision - Complex Assessment   Ocular Range of [facility]   Acuity Able to read employee name badge without difficulty  (able to state digits held up)   Additional Comments wife reports pt having changes in vision   Perception   Inattention/Neglect Appears intact   Cognition   Overall Cognitive Status Impaired   Arousal/Participation Responsive; Cooperative   Attention Difficulty attending to directions   Orientation Level Oriented to person;Disoriented to place; Disoriented to time;Disoriented to situation; stated birthday as "turkey day" as he was born on that day   Memory Decreased recall of precautions;Decreased recall of recent events;Decreased short term memory   Following Commands Follows one step commands with increased time or repetition   Comments pt w/ dementia at baseline, w/ increased processing time and delayed responses, decreased insight into deficits, pt pleasant and cooperative   Assessment   Limitation Decreased ADL status; Decreased cognition;Decreased UE strength;Decreased Safe judgement during ADL;Decreased endurance;Decreased self-care trans;Decreased high-level ADLs   Prognosis Good   Assessment Pt is a 80 y o  male seen for OT evaluation s/p admit to [facility] on 6/2/2021 w/ ambulatory dysfunction and caregiver difficulty assisting w/ patient at home due to weakness and impaired balance and recent fall  Comorbidities affecting pt's functional performance at time of assessment include: DM II, anxiety disorder, HTN, hyperlipidemia, dementia  Personal factors affecting pt at time of IE include:pt spouse expresses increased difficulty in caring for patient at home as pt requiring increased physical assist for tasks and decline in ADL and functional transfer performance over past few months   Prior to admission, pt was living w/ spouse and spouse reports setup self-feeding, setup-min assist UB ADLs, assist w/ LB ADLs, assist w/ toileting and incontinence, wife does IADLs and medication management  Upon evaluation: Pt requires supervision w/ increased time, MOD assist x2 sit>stand from bed w/ increased time and moderate VCs and tactile cues for hand placement, MIN assist x1 functional mobility w/ RW, MOD assist x1 sit<>stand from toilet w/ BSC over top w/ VCs for hand placement and mod cues for controlled descent, MIN assist steadying at sink for grooming of washing hands, MAX assist toileting w/ clothing management, MOD assist x2 stand>sit in chair w/ delayed processing and moderate cues for hand placement 2* the following deficits impacting occupational performance: decreased strength and endurance, impaired balance, impaired activity tolerance, delayed processing, fall risk, impaired cognition, impaired functional reach, requires simple one step commands, decreased FM coordination, visual deficits  Pt to benefit from continued skilled OT tx while in the hospital to address deficits as defined above and maximize level of functional independence w ADL's and functional mobility  Occupational Performance areas to address include: grooming, bathing/shower, toilet hygiene, dressing, health maintenance, functional mobility, clothing management, cleaning and meal prep, formal cognitive assessment  From OT standpoint, recommendation at time of d/c would be short term rehab  The patient's raw score on the AM-PAC Daily Activity inpatient short form is 14, standardized score is 33 39, less than 39 4  Patients at this level are likely to benefit from discharge to post-acute rehabilitation services  Please refer to the recommendation of the Occupational Therapist for safe discharge planning     Goals   Patient Goals "to get stronger'   LTG Time Frame 10-14   Long Term Goal please see below goals   Plan   Treatment Interventions ADL retraining;Functional transfer training;UE strengthening/ROM; Cognitive reorientation; Endurance training;Patient/family training;Equipment evaluation/education; Compensatory technique education; Energy conservation; Activityengagement   Goal Expiration Date 06/17/21   OT Frequency 3-5x/wk   Recommendation   Recommendation Geriatric Consult   OT Discharge Recommendation Post acute rehabilitation services  (w/ potential increased in care/supportive environment)   OT - OK to Discharge   (to rehab when medically stable)   AM-PAC Daily Activity Inpatient   Lower Body Dressing 2   Bathing 2   Toileting 2   Upper Body Dressing 2   Grooming 3   Eating 3   Daily Activity Raw Score 14   Daily Activity Standardized Score (Calc for Raw Score >=11) 33 39   AM-PAC Applied Cognition Inpatient   Following a Speech/Presentation 1   Understanding Ordinary Conversation 2   Taking Medications 1   Remembering Where Things Are Placed or Put Away 1   Remembering List of 4-5 Errands 1   Taking Care of Complicated Tasks 1   Applied Cognition Raw Score 7   Applied Cognition Standardized Score 15 17   Modified Tyrese Scale   Modified Irvine Scale 4     Occupational Therapy Goals to be met in 10-14 days:  1) Pt will improve activity tolerance to G for 30 min txment sessions to enhance ADLs  2) Pt will complete UB ADLs/self care w/ supervision and min assist LB ADLs  3) Pt will complete toileting w/ min assist G hygiene/thoroughness using DME PRN  4) Pt will improve functional transfers on/off all surfaces using DME PRN w/ G balance/safety including toileting w/ supervision  5) Pt will improve fx'l mobility during I/ADl/leisure tasks using DME PRN w/ g balance/safety w/ supervision  6) Pt will engage in ongoing cognitive assessment w/ G participation to A w/ safe d/c planning/recommendations  7) Pt will demonstrate G carryover of pt/caregiver education and training as appropriate w/ mod I  w/ G tolerance  8) Pt will engage in depression screen/leisure interest checklist w/ G participation to monitor s/s depression and ID 3 positive coping strategies to A w/ emotional regulation and management  9) Pt will demonstrate 100% carryover of E C  techniques w/ mod I t/o fx'l I/ADL/leisure tasks w/o cues s/p skilled education  11) Pt will engage in activity configuration activity w/ G participation and mod I to increase time management skills and improve participation in a structured routine to improve overall quality of life  12) Pt will demonstrate improved standing tolerance to 3-5 minutes during functional tasks w/ Fair + dynamic standing balance to enhance ADL performance  13) Pt will demonstrate improved b/l UE strength by 1 MMT grade to enhance ADLS and functional transfers  14) Pt will be A&Ox 3 w/ utilization of environmental cues for 75% OT sessions    Documentation completed by: Toni Basurto MS, OTR/L

## 2021-06-03 NOTE — PLAN OF CARE
Problem: PHYSICAL THERAPY ADULT  Goal: Performs mobility at highest level of function for planned discharge setting  See evaluation for individualized goals  Description: Treatment/Interventions: Functional transfer training, LE strengthening/ROM, Elevations, Therapeutic exercise, Cognitive reorientation, Patient/family training, Equipment eval/education, Bed mobility, Gait training, Continued evaluation, Spoke to nursing, Family, OT, Spoke to case management          See flowsheet documentation for full assessment, interventions and recommendations  Note: Prognosis: Fair  Problem List: Decreased strength, Impaired balance, Decreased mobility, Decreased cognition, Impaired judgement, Decreased safety awareness  Assessment: Smith Milan is a 80 y o  male admitted to [facility] on 6/2/2021 for Dementia without behavioral disturbance ([facility] )  Wife present to provide social hx and PLOF  Reports at baseline needs min A for ADLs and amb w RW limited community distances  Has been having significant cognitive and functional decline over the past few weeks, requiring more physical assistance than wife can provide indep  PT was consulted and pt was seen on 6/3/2021 for mobility assessment and d/c planning  Pt presents w telemetry, high fall risk  Pt is currently functioning at a supervision assistance x1 level for bed mobility, moderate assistance x1-2 level for transfers, minimum assistance x1 level for ambulation with Rolling Walker  Pt demonstrated cognitive deficits including impaired processing affecting mobility  Especially had difficulty initiating transfers  Require physical Ax2 to stand from lower height surfaces and tactile input to initiate transition stand>sit  Once standing w fair (-) balance but require CGA for safety given cognitive deficits and fall risk   Pt will benefit from continued skilled IP PT to address the above mentioned impairments  in order to maximize recovery and increase functional independence when completing mobility and ADLs  At this time PT recommendations for d/c are STR given limited available support at home, functional decline and high risk for recurrent falls  Pending progress in therapy vs additional assistance able to have in home, may consider LTC given significant caregiver burden  Barriers to Discharge: Decreased caregiver support  Barriers to Discharge Comments: caregiver burden     PT Discharge Recommendation: Post acute rehabilitation services(may benefit from LTC pending progress)     PT - OK to Discharge: Yes    See flowsheet documentation for full assessment

## 2021-06-03 NOTE — PROGRESS NOTES
Beata Fry  Progress Note - Olimpia Cason 1935, 80 y o  male MRN: [id number]  Unit/Bed#: E2 -01 Encounter: [id number]  Primary Care Provider: Mary Ellen Garcia MD   Date and time admitted to hospital: 6/2/2021  1:36 PM    * Dementia without behavioral disturbance (HCC)  Assessment & Plan  · Dementia with ambulatory dysfunction  Spouse unable to care for him anymore  · PT/OT recommending rehab  Family agreeable  Type 2 diabetes mellitus with stage 3 chronic kidney disease, with long-term current use of insulin [facility])  Assessment & Plan  Lab Results   Component Value Date    HGBA1C 9 2 (A) 10/13/2020     Results from last 7 days   Lab Units 06/03/21  1710 06/03/21  1600 06/03/21  1137 06/03/21  0652 06/02/21  2114 06/02/21  1927   POC GLUCOSE mg/dl 221* 68 165* 124 139 231*     · Diabetes mellitus with hyperglycemia on admission  Currently on increase glargine but standing lispro 6 units t i d  · Holding glimepiride and acarbose    Anxiety disorder  Assessment & Plan  · Mood stable continue sertraline    Essential hypertension  Assessment & Plan  · Continue losartan    Mixed hyperlipidemia  Assessment & Plan  · Continue pravastatin      VTE Pharmacologic Prophylaxis: VTE Score: 4 Moderate Risk (Score 3-4) - Pharmacological DVT Prophylaxis Ordered: Enoxaparin (Lovenox)  Patient Centered Rounds: I have performed bedside rounds with nursing staff today  Discussions with Specialists or Other Care Team Provider:  Case management    Education and Discussions with Family / Patient:  Spouse and family at bedside    Time Spent for Care: 25 mins  More than 50% of total time spent on counseling and coordination of care as described above      Current Length of Stay: 1 day(s)  Current Patient Status: Inpatient   Certification Statement: The patient will continue to require additional inpatient hospital stay due to rehab placement  Discharge Plan / Estimated Discharge Date: 24-48 hours to rehab    Code Status: Level 3 - DNAR and DNI      Subjective:   Patient seen and examined  No new complaints  Pleasantly confused  Family at bedside during earlier rounds    Objective:   Vitals: Blood pressure 159/84, pulse 95, temperature (!) 97 °F (36 1 °C), temperature source Tympanic, resp  rate 18, SpO2 97 %  Physical Exam  Vitals signs reviewed  Constitutional:       General: He is not in acute distress  Cardiovascular:      Rate and Rhythm: Regular rhythm  Heart sounds: Normal heart sounds  Pulmonary:      Effort: Pulmonary effort is normal       Breath sounds: Decreased breath sounds present  No wheezing  Abdominal:      General: Bowel sounds are normal       Palpations: Abdomen is soft  Tenderness: There is no abdominal tenderness  There is no rebound  Musculoskeletal:         General: No swelling or tenderness  Skin:     General: Skin is warm and dry  Neurological:      Mental Status: He is alert  Mental status is at baseline  He is disoriented  Cranial Nerves: No cranial nerve deficit     Psychiatric:         Mood and Affect: Mood normal        Additional Data:   Labs:  Results from last 7 days   Lab Units 06/03/21  0503 06/02/21  1413   WBC Thousand/uL 9 76 8 40   HEMOGLOBIN g/dL 15 1 14 9   HEMATOCRIT % 45 7 44 1   MCV fL 93 92   PLATELETS Thousands/uL 258 275     Results from last 7 days   Lab Units 06/03/21  0503 06/02/21  1413   SODIUM mmol/L 141 139   POTASSIUM mmol/L 3 9 4 1   CHLORIDE mmol/L 104 99*   CO2 mmol/L 29 30   ANION GAP mmol/L 8 10   BUN mg/dL 29* 25   CREATININE mg/dL 1 25 1 33*   CALCIUM mg/dL 9 2 9 0   ALBUMIN g/dL  --  2 5*   TOTAL BILIRUBIN mg/dL  --  0 47   ALK PHOS U/L  --  105   ALT U/L  --  25   AST U/L  --  22   EGFR ml/min/1 73sq m 52 48   GLUCOSE RANDOM mg/dL 71 335*         Results from last 7 days   Lab Units 06/02/21  1543   TROPONIN I ng/mL <0 02              Results from last 7 days   Lab Units 06/03/21  1710 06/03/21  1600 06/03/21  1137 06/03/21  0652 06/02/21  2114 06/02/21  1927   POC GLUCOSE mg/dl 221* 68 165* 124 139 231*             * I Have Reviewed All Lab Data Listed Above  Cultures:                   Lines/Drains:  Invasive Devices     Peripheral Intravenous Line            Peripheral IV 06/02/21 Left;Ventral (anterior) Forearm 1 day              Telemetry:      Imaging:  Imaging Reports Reviewed Today Include:   No results found  Scheduled Meds:  Current Facility-Administered Medications   Medication Dose Route Frequency Provider Last Rate    aspirin  81 mg Oral Daily Morgan Turk MD      enoxaparin  40 mg Subcutaneous Daily Morgan Turk MD      insulin glargine  20 Units Subcutaneous HS Morgan Turk MD      insulin lispro  1-5 Units Subcutaneous TID [facility] Morgan Turk MD      insulin lispro  6 Units Subcutaneous Daily With Breakfast Morgan Turk MD      insulin lispro  6 Units Subcutaneous Daily With Lunch Morgan Turk MD      insulin lispro  6 Units Subcutaneous Daily With Niko King MD      losartan  100 mg Oral Daily Morgan Turk MD      melatonin  3 mg Oral HS Morgan Turk MD      nystatin   Topical BID Morgan Turk MD      pravastatin  40 mg Oral Daily With Niko King MD      sertraline  50 mg Oral Daily MD Niraj Mcknight DO [address] Internal Medicine  Hospitalist    ** Please Note: This note has been constructed using a voice recognition system   **

## 2021-06-03 NOTE — ASSESSMENT & PLAN NOTE
Lab Results   Component Value Date    HGBA1C 9 2 (A) 10/13/2020     Results from last 7 days   Lab Units 06/03/21  1710 06/03/21  1600 06/03/21  1137 06/03/21  0652 06/02/21  2114 06/02/21  1927   POC GLUCOSE mg/dl 221* 68 165* 124 139 231*     · Diabetes mellitus with hyperglycemia on admission  Currently on increase glargine but standing lispro 6 units t i d    · Holding glimepiride and acarbose

## 2021-06-03 NOTE — CASE MANAGEMENT
LOS 1 day  URR 11  Pt is not a 30 day re admission  Pt is not a [facility] bundle  CM met with Pt and spouse Zora Kumar at bedside to discuss  plans for discharge  Pt is an 79 yo gentleman who lives with his spouse in their one bedrm apartment  PT rec for rehabilitation at discharge  CM provided education and discussed options with family  CM will place referrals based on family preference on 6 4  CM will continue to follow Pt to assess for ongoing needs prior to discharge  CM reviewed d/c planning process including the following: identifying help at home, patient preference for d/c planning needs, availability of treatment team to discuss questions or concerns patient and/or family may have regarding understanding medications and recognizing signs and symptoms once discharged  CM also encouraged patient to follow up with all recommended appointments after discharge  Patient advised of importance for patient and family to participate in managing patients medical well being

## 2021-06-03 NOTE — ASSESSMENT & PLAN NOTE
· Dementia with ambulatory dysfunction  Spouse unable to care for him anymore  · PT/OT recommending rehab  Family agreeable

## 2021-06-03 NOTE — PHYSICAL THERAPY NOTE
PHYSICAL THERAPY EVALUATION & [address] [place] [address] NOTE          Patient Name: Paty To  Today's Date: 6/3/2021   PT EVALUATION 4550-2036  Therapeutic Activity 8825-0483    85 y o     [id number]    Weakness [R53 1]  Generalized weakness [R53 1]  Ambulatory dysfunction [R26 2]  History of dementia [Z86 59]  Hyperglycemia due to diabetes mellitus ([facility] ) [E11 65]    Past Medical History:   Diagnosis Date    Anal fissure     Anxiety     Cervical spinal stenosis     CKD (chronic kidney disease) stage 3, GFR 30-59 ml/min ([facility])     Coronary artery disease     Degenerative joint disease of ankle and foot, unspecified laterality     Dementia ([facility] )     Disc degeneration, lumbar     Diverticulosis     Erectile dysfunction of non-organic origin     Hematuria     last assessed 8/28/12    Hyperlipidemia     Hypertension     IBS (irritable bowel syndrome)     Lumbar radiculopathy     Multiple benign polyps of large intestine     benign, last assessed 6/26/12    Osteoarthritis     hip    PSVT (paroxysmal supraventricular tachycardia) ([place] [address] ) 02/2013    S/P CABG x 4 [facility]    Spondylolisthesis of lumbar region     Type 2 diabetes mellitus with diabetic neuropathy ([facility] )      Past Surgical History:   Procedure Laterality Date    CATARACT EXTRACTION Bilateral     COLONOSCOPY      COLONOSCOPY  12/2014    CORONARY ARTERY BYPASS GRAFT      1999 cabgx4    ESOPHAGOGASTRODUODENOSCOPY      AR TOTAL HIP ARTHROPLASTY Left 2/3/2016    Procedure: ARTHROPLASTY HIP TOTAL;  Surgeon: Candance Knife, MD;  [facility]: [facility];  Service: Orthopedics    TONSILLECTOMY      UMBILICAL HERNIA REPAIR      WRIST SURGERY Left         06/03/21 1001   PT Last Visit   PT Visit Date 06/03/21   Note Type   Note type Evaluation   Pain Assessment   Pain Assessment Tool Pain Assessment not indicated - pt denies pain   Pain Score No Pain   Home Living   Type of Home Apartment   Home Layout One level;Stairs to enter without rails   Bathroom Shower/Tub Walk-in shower   Bathroom Toilet Standard   Bathroom Equipment Grab bars in shower; [address]; Other (Comment)  (lift chair)   Additional Comments obtained via spouse  1 DARIUSZ  pt sleeps in standard bed   Prior Function   Level of [place] Needs assistance with ADLs and functional mobility; Needs assistance with IADLs   Lives With Spouse   Receives Help From Family   ADL Assistance Needs assistance   IADLs Needs assistance   Falls in the last 6 months 1 to 4   Vocational Retired   Comments obtained via spouse  reports at baseline pt amb w RW, needs min A for mobility and ADLs  could amb community distances w fatigue  over the past month, pt has had significant cognitive and functional decline, needing more A than pt wife can provide  wife is [de-identified] yo, has been has long time caregiver and uses a QC herself for mobility  Restrictions/Precautions   Other Precautions Cognitive; Chair Alarm; Bed Alarm; Fall Risk   General   Additional Pertinent History pt admitted 6/2/21 for dementia without behavior disturbance  activity up w assist orders   Family/Caregiver Present Yes  (wife)   Cognition   Overall Cognitive Status Impaired   Arousal/Participation Cooperative   Attention Attends with cues to redirect   Orientation Level Oriented to person;Disoriented to place; Disoriented to time;Disoriented to situation   Memory Decreased recall of precautions;Decreased recall of recent events;Decreased short term memory   Following Commands Follows one step commands inconsistently   Comments hx of dementia  per wife w worsening cognitive decline over the past month  presents w impaired cognition including impaired processing and motor planning   RLE Assessment   RLE Assessment WFL  (difficult to assess 2* cognition)   LLE Assessment   LLE Assessment WFL  (difficult to assess 2* cognition)   Coordination   Movements are Fluid and Coordinated 0   Coordination and Movement Description poor motor planning   Bed Mobility   Supine to Sit 5  Supervision   Additional items Assist x 1;HOB elevated; Bedrails; Increased time required;Verbal cues   Transfers   Sit to Stand 3  Moderate assistance   Additional items Assist x 2;Bedrails; Increased time required;Verbal cues   Toilet transfer 3  Moderate assistance   Additional items Assist x 1; Increased time required;Verbal cues; Other;Commode;Standard toilet  (RW; BSC over toilet)   Ambulation/Elevation   Gait pattern Decreased foot clearance; Short stride; Excessively slow  (poor obstacle avoidance/ RW management)   Gait Assistance 4  Minimal assist   Additional items Assist x 1;Verbal cues; Tactile cues   Assistive Device Rolling walker   Distance 35'   Balance   Static Standing Fair -   Dynamic Standing Poor +   Ambulatory Poor +   Endurance Deficit   Endurance Deficit No   Activity Tolerance   Activity Tolerance Patient tolerated treatment well; Other (Comment)  (cognition)   Medical Staff Made Aware Sintia EASLEY CM   Nurse Made Aware Vivi RN   Assessment   Prognosis Fair   Problem List Decreased strength; Impaired balance;Decreased mobility; Decreased cognition; Impaired judgement;Decreased safety awareness   Assessment Pee Cabrales is a 80 y o  male admitted to [facility] on 6/2/2021 for Dementia without behavioral disturbance ([facility] )  Wife present to provide social hx and PLOF  Reports at baseline needs min A for ADLs and amb w RW limited community distances  Has been having significant cognitive and functional decline over the past few weeks, requiring more physical assistance than wife can provide indep  PT was consulted and pt was seen on 6/3/2021 for mobility assessment and d/c planning  Pt presents w telemetry, high fall risk  Pt is currently functioning at a supervision assistance x1 level for bed mobility, moderate assistance x1-2 level for transfers, minimum assistance x1 level for ambulation with Rolling Walker   Pt demonstrated cognitive deficits including impaired processing affecting mobility  Especially had difficulty initiating transfers  Require physical Ax2 to stand from lower height surfaces and tactile input to initiate transition stand>sit  Once standing w fair (-) balance but require CGA for safety given cognitive deficits and fall risk  Pt will benefit from continued skilled IP PT to address the above mentioned impairments  in order to maximize recovery and increase functional independence when completing mobility and ADLs  At this time PT recommendations for d/c are STR given limited available support at home, functional decline and high risk for recurrent falls  Pending progress in therapy vs additional assistance able to have in home, may consider LTC given significant caregiver burden  Barriers to Discharge Decreased caregiver support   Barriers to Discharge Comments caregiver burden   Goals   Patient Goals to return to baseline LOF   STG Expiration Date 06/17/21   Short Term Goal #1 1)  Pt will perform bed mobility from flat bed with S demonstrating appropriate technique 100% of the time in order to improve function  2)  Perform all transfers with S demonstrating safe and appropriate technique 100% of the time in order to improve ability to negotiate safely in home environment  3) Amb with least restrictive AD > 50'x1 with S in order to demonstrate ability to negotiate in home environment  4)  Improve overall strength and balance 1/2 grade in order to optimize ability to perform functional tasks and reduce fall risk  5) Increase activity tolerance to 45 minutes in order to improve endurance to functional tasks  6)  Negotiate stairs using most appropriate technique and min A in order to be able to negotiate safely in home environment  7) PT for ongoing patient and family/caregiver education, DME needs and d/c planning in order to promote highest level of function in least restrictive environment     PT Treatment Day 1   Plan   Treatment/Interventions Functional transfer training;LE strengthening/ROM; Elevations; Therapeutic exercise;Cognitive reorientation;Patient/family training;Equipment eval/education; Bed mobility;Gait training;Continued evaluation;Spoke to nursing;Family;OT;Spoke to case management   PT Frequency Other (Comment)  (4-5x)   Recommendation   PT Discharge Recommendation Post acute rehabilitation services  (may benefit from LTC pending progress)   PT - OK to Discharge Yes   Additional Comments The patient's AM-PAC Basic Mobility Inpatient Short Form Raw Score is 14, Standardized Score is 35 55  A standardized score less than 42 9 suggests the patient may benefit from discharge to post-acute rehabilitation services  Please also refer to the recommendation of the Physical Therapist for safe discharge planning  AM-PAC Basic Mobility Inpatient   Turning in Bed Without Bedrails 3   Lying on Back to Sitting on Edge of Flat Bed 2   Moving Bed to Chair 2   Standing Up From Chair 2   Walk in Room 3   Climb 3-5 Stairs 2   Basic Mobility Inpatient Raw Score 14   Basic Mobility Standardized Score 35 55   History: co - morbidities, age, social background, fall risk, use of assistive device, assist for adl's, cognition  Exam: impairments in systems including musculoskeletal (strength), neuromuscular (balance, gait, transfers, motor function), AM_PAC, cognition  Clinical: unstable/unpredictable  Complexity:high      Bettylou Masood, PT     PT Progress Note  S: Pt requesting to use bathroom  O: Mod Ax1 to stand from toilet (BSC over standard toilet)  Min Ax1 to ambulate 10' w RW  Mod Ax1 stand>sit to lower height surface  Tactile cues prn to initiate transf [place] stand>sit  Verbal cuing for technique and direction, sometimes requiring hand over hand guidance  A: Pt continues to be limited by cognition impacting processing, recall/ carry over and safety awareness   No gross LOB during ambulation but struggles to initiate movements, motor plan and correct motor response  At current LOF pt wife unable to assist pt as he current requires mod Ax1-2 people to safely transf  Would benefit from STR to address current deficits for improved mobility and decrease caregiver burden  Pending progress in therapy may consider LTC  P: See IE for POC and recommendations

## 2021-06-03 NOTE — PLAN OF CARE
Problem: Potential for Falls  Goal: Patient will remain free of falls  Description: INTERVENTIONS:  - Assess patient frequently for physical needs  -  Identify cognitive and physical deficits and behaviors that affect risk of falls  -  [place] fall precautions as indicated by assessment   - Educate patient/family on patient safety including physical limitations  - Instruct patient to call for assistance with activity based on assessment  - Modify environment to reduce risk of injury  - Consider OT/PT consult to assist with strengthening/mobility  Outcome: Progressing     Problem: Nutrition/Hydration-ADULT  Goal: Nutrient/Hydration intake appropriate for improving, restoring or maintaining nutritional needs  Description: Monitor and assess patient's nutrition/hydration status for malnutrition  Collaborate with interdisciplinary team and initiate plan and interventions as ordered  Monitor patient's weight and dietary intake as ordered or per policy  Utilize nutrition screening tool and intervene as necessary  Determine patient's food preferences and provide high-protein, high-caloric foods as appropriate       INTERVENTIONS:  - Monitor oral intake, urinary output, labs, and treatment plans  - Assess nutrition and hydration status and recommend course of action  - Evaluate amount of meals eaten  - Assist patient with eating if necessary   - Allow adequate time for meals  - Recommend/ encourage appropriate diets, oral nutritional supplements, and vitamin/mineral supplements  - Order, calculate, and assess calorie counts as needed  - Recommend, monitor, and adjust tube feedings and TPN/PPN based on assessed needs  - Assess need for intravenous fluids  - Provide specific nutrition/hydration education as appropriate  - Include patient/family/caregiver in decisions related to nutrition  Outcome: Progressing     Problem: Prexisting or High Potential for Compromised Skin Integrity  Goal: Skin integrity is maintained or improved  Description: INTERVENTIONS:  - Identify patients at risk for skin breakdown  - Assess and monitor skin integrity  - Assess and monitor nutrition and hydration status  - Monitor labs   - Assess for incontinence   - Turn and reposition patient  - Assist with mobility/ambulation  - Relieve pressure over bony prominences  - Avoid friction and shearing  - Provide appropriate hygiene as needed including keeping skin clean and dry  - Evaluate need for skin moisturizer/barrier cream  - Collaborate with interdisciplinary team   - Patient/family teaching  - Consider wound care consult   Outcome: Progressing     Problem: PAIN - ADULT  Goal: Verbalizes/displays adequate comfort level or baseline comfort level  Description: Interventions:  - Encourage patient to monitor pain and request assistance  - Assess pain using appropriate pain scale  - Administer analgesics based on type and severity of pain and evaluate response  - Implement non-pharmacological measures as appropriate and evaluate response  - Consider cultural and social influences on pain and pain management  - Notify physician/advanced practitioner if interventions unsuccessful or patient reports new pain  Outcome: Progressing     Problem: INFECTION - ADULT  Goal: Absence or prevention of progression during hospitalization  Description: INTERVENTIONS:  - Assess and monitor for signs and symptoms of infection  - Monitor lab/diagnostic results  - Monitor all insertion sites, i e  indwelling lines, tubes, and drains  - Monitor endotracheal if appropriate and nasal secretions for changes in amount and color  - [place] appropriate cooling/warming therapies per order  - Administer medications as ordered  - Instruct and encourage patient and family to use good hand hygiene technique  - Identify and instruct in appropriate isolation precautions for identified infection/condition  Outcome: Progressing  Goal: Absence of fever/infection during neutropenic period  Description: INTERVENTIONS:  - Monitor WBC    Outcome: Progressing     Problem: SAFETY ADULT  Goal: Patient will remain free of falls  Description: INTERVENTIONS:  - Assess patient frequently for physical needs  -  Identify cognitive and physical deficits and behaviors that affect risk of falls    -  [place] fall precautions as indicated by assessment   - Educate patient/family on patient safety including physical limitations  - Instruct patient to call for assistance with activity based on assessment  - Modify environment to reduce risk of injury  - Consider OT/PT consult to assist with strengthening/mobility  Outcome: Progressing  Goal: Maintain or return to baseline ADL function  Description: INTERVENTIONS:  -  Assess patient's ability to carry out ADLs; assess patient's baseline for ADL function and identify physical deficits which impact ability to perform ADLs (bathing, care of mouth/teeth, toileting, grooming, dressing, etc )  - Assess/evaluate cause of self-care deficits   - Assess range of motion  - Assess patient's mobility; develop plan if impaired  - Assess patient's need for assistive devices and provide as appropriate  - Encourage maximum independence but intervene and supervise when necessary  - Involve family in performance of ADLs  - Assess for home care needs following discharge   - Consider OT consult to assist with ADL evaluation and planning for discharge  - Provide patient education as appropriate  Outcome: Progressing  Goal: Maintain or return mobility status to optimal level  Description: INTERVENTIONS:  - Assess patient's baseline mobility status (ambulation, transfers, stairs, etc )    - Identify cognitive and physical deficits and behaviors that affect mobility  - Identify mobility aids required to assist with transfers and/or ambulation (gait belt, sit-to-stand, lift, walker, cane, etc )  - [place] fall precautions as indicated by assessment  - Record patient progress and toleration of activity level on Mobility SBAR; progress patient to next Phase/Stage  - Instruct patient to call for assistance with activity based on assessment  - Consider rehabilitation consult to assist with strengthening/weightbearing, etc   Outcome: Progressing     Problem: DISCHARGE PLANNING  Goal: Discharge to home or other facility with appropriate resources  Description: INTERVENTIONS:  - Identify barriers to discharge w/patient and caregiver  - Arrange for needed discharge resources and transportation as appropriate  - Identify discharge learning needs (meds, wound care, etc )  - Arrange for interpretive services to assist at discharge as needed  - Refer to Case Management Department for coordinating discharge planning if the patient needs post-hospital services based on physician/advanced practitioner order or complex needs related to functional status, cognitive ability, or social support system  Outcome: Progressing     Problem: Knowledge Deficit  Goal: Patient/family/caregiver demonstrates understanding of disease process, treatment plan, medications, and discharge instructions  Description: Complete learning assessment and assess knowledge base    Interventions:  - Provide teaching at level of understanding  - Provide teaching via preferred learning methods  Outcome: Progressing     Problem: NEUROSENSORY - ADULT  Goal: Achieves stable or improved neurological status  Description: INTERVENTIONS  - Monitor and report changes in neurological status  - Monitor vital signs such as temperature, blood pressure, glucose, and any other labs ordered   - Initiate measures to prevent increased intracranial pressure  - Monitor for seizure activity and implement precautions if appropriate      Outcome: Progressing  Goal: Remains free of injury related to seizures activity  Description: INTERVENTIONS  - Maintain airway, patient safety  and administer oxygen as ordered  - Monitor patient for seizure activity, document and report duration and description of seizure to physician/advanced practitioner  - If seizure occurs,  ensure patient safety during seizure  - Reorient patient post seizure  - Seizure pads on all 4 side rails  - Instruct patient/family to notify RN of any seizure activity including if an aura is experienced  - Instruct patient/family to call for assistance with activity based on nursing assessment  - Administer anti-seizure medications if ordered    Outcome: Progressing  Goal: Achieves maximal functionality and self care  Description: INTERVENTIONS  - Monitor swallowing and airway patency with patient fatigue and changes in neurological status  - Encourage and assist patient to increase activity and self care     - Encourage visually impaired, hearing impaired and aphasic patients to use assistive/communication devices  Outcome: Progressing     Problem: METABOLIC, FLUID AND ELECTROLYTES - ADULT  Goal: Electrolytes maintained within normal limits  Description: INTERVENTIONS:  - Monitor labs and assess patient for signs and symptoms of electrolyte imbalances  - Administer electrolyte replacement as ordered  - Monitor response to electrolyte replacements, including repeat lab results as appropriate  - Instruct patient on fluid and nutrition as appropriate  Outcome: Progressing  Goal: Fluid balance maintained  Description: INTERVENTIONS:  - Monitor labs   - Monitor I/O and WT  - Instruct patient on fluid and nutrition as appropriate  - Assess for signs & symptoms of volume excess or deficit  Outcome: Progressing  Goal: Glucose maintained within target range  Description: INTERVENTIONS:  - Monitor Blood Glucose as ordered  - Assess for signs and symptoms of hyperglycemia and hypoglycemia  - Administer ordered medications to maintain glucose within target range  - Assess nutritional intake and initiate nutrition service referral as needed  Outcome: Progressing     Problem: SKIN/TISSUE INTEGRITY - ADULT  Goal: Skin integrity remains intact  Description: INTERVENTIONS  - Identify patients at risk for skin breakdown  - Assess and monitor skin integrity  - Assess and monitor nutrition and hydration status  - Monitor labs (i e  albumin)  - Assess for incontinence   - Turn and reposition patient  - Assist with mobility/ambulation  - Relieve pressure over bony prominences  - Avoid friction and shearing  - Provide appropriate hygiene as needed including keeping skin clean and dry  - Evaluate need for skin moisturizer/barrier cream  - Collaborate with interdisciplinary team (i e  Nutrition, Rehabilitation, etc )   - Patient/family teaching  Outcome: Progressing  Goal: Incision(s), wounds(s) or drain site(s) healing without S/S of infection  Description: INTERVENTIONS  - Assess and document risk factors for skin impairment   - Assess and document dressing, incision, wound bed, drain sites and surrounding tissue  - Consider nutrition services referral as needed  - Oral mucous membranes remain intact  - Provide patient/ family education  Outcome: Progressing  Goal: Oral mucous membranes remain intact  Description: INTERVENTIONS  - Assess oral mucosa and hygiene practices  - Implement preventative oral hygiene regimen  - Implement oral medicated treatments as ordered  - Initiate Nutrition services referral as needed  Outcome: Progressing     Problem: MUSCULOSKELETAL - ADULT  Goal: Maintain or return mobility to safest level of function  Description: INTERVENTIONS:  - Assess patient's ability to carry out ADLs; assess patient's baseline for ADL function and identify physical deficits which impact ability to perform ADLs (bathing, care of mouth/teeth, toileting, grooming, dressing, etc )  - Assess/evaluate cause of self-care deficits   - Assess range of motion  - Assess patient's mobility  - Assess patient's need for assistive devices and provide as appropriate  - Encourage maximum independence but intervene and supervise when necessary  - Involve family in performance of ADLs  - Assess for home care needs following discharge   - Consider OT consult to assist with ADL evaluation and planning for discharge  - Provide patient education as appropriate  Outcome: Progressing  Goal: Maintain proper alignment of affected body part  Description: INTERVENTIONS:  - Support, maintain and protect limb and body alignment  - Provide patient/ family with appropriate education  Outcome: Progressing

## 2021-06-04 LAB
GLUCOSE SERPL-MCNC: 128 MG/DL (ref 65–140)
GLUCOSE SERPL-MCNC: 210 MG/DL (ref 65–140)
GLUCOSE SERPL-MCNC: 258 MG/DL (ref 65–140)
GLUCOSE SERPL-MCNC: 92 MG/DL (ref 65–140)
SARS-COV-2 IGG SERPL QL IA: REACTIVE
SARS-COV-2 IGG+IGM SERPL QL IA: REACTIVE

## 2021-06-04 PROCEDURE — 99231 SBSQ HOSP IP/OBS SF/LOW 25: CPT | Performed by: INTERNAL MEDICINE

## 2021-06-04 PROCEDURE — 86769 SARS-COV-2 COVID-19 ANTIBODY: CPT | Performed by: INTERNAL MEDICINE

## 2021-06-04 PROCEDURE — 82948 REAGENT STRIP/BLOOD GLUCOSE: CPT

## 2021-06-04 PROCEDURE — 97530 THERAPEUTIC ACTIVITIES: CPT

## 2021-06-04 RX ORDER — NYSTATIN 100000 [USP'U]/G
POWDER TOPICAL 2 TIMES DAILY
Qty: 15 G | Refills: 0
Start: 2021-06-04

## 2021-06-04 RX ORDER — MICONAZOLE NITRATE 20 MG/G
CREAM TOPICAL 2 TIMES DAILY
Status: DISCONTINUED | OUTPATIENT
Start: 2021-06-04 | End: 2021-06-09 | Stop reason: HOSPADM

## 2021-06-04 RX ORDER — INSULIN GLARGINE 100 [IU]/ML
20 INJECTION, SOLUTION SUBCUTANEOUS
Qty: 10 ML | Refills: 0
Start: 2021-06-04

## 2021-06-04 RX ADMIN — NYSTATIN 1 APPLICATION: 100000 POWDER TOPICAL at 18:00

## 2021-06-04 RX ADMIN — INSULIN GLARGINE 20 UNITS: 100 INJECTION, SOLUTION SUBCUTANEOUS at 21:28

## 2021-06-04 RX ADMIN — ENOXAPARIN SODIUM 40 MG: 40 INJECTION SUBCUTANEOUS at 09:48

## 2021-06-04 RX ADMIN — ASPIRIN 81 MG: 81 TABLET, CHEWABLE ORAL at 09:49

## 2021-06-04 RX ADMIN — SERTRALINE HYDROCHLORIDE 50 MG: 50 TABLET ORAL at 09:49

## 2021-06-04 RX ADMIN — MICONAZOLE NITRATE 1 APPLICATION: 20 CREAM TOPICAL at 17:57

## 2021-06-04 RX ADMIN — MICONAZOLE NITRATE 1 APPLICATION: 20 CREAM TOPICAL at 11:39

## 2021-06-04 RX ADMIN — LOSARTAN POTASSIUM 100 MG: 50 TABLET, FILM COATED ORAL at 09:48

## 2021-06-04 RX ADMIN — INSULIN LISPRO 2 UNITS: 100 INJECTION, SOLUTION INTRAVENOUS; SUBCUTANEOUS at 11:38

## 2021-06-04 RX ADMIN — PRAVASTATIN SODIUM 40 MG: 40 TABLET ORAL at 17:57

## 2021-06-04 RX ADMIN — NYSTATIN 1 APPLICATION: 100000 POWDER TOPICAL at 09:49

## 2021-06-04 RX ADMIN — INSULIN LISPRO 6 UNITS: 100 INJECTION, SOLUTION INTRAVENOUS; SUBCUTANEOUS at 07:46

## 2021-06-04 RX ADMIN — MELATONIN 3 MG: at 21:28

## 2021-06-04 RX ADMIN — INSULIN LISPRO 6 UNITS: 100 INJECTION, SOLUTION INTRAVENOUS; SUBCUTANEOUS at 11:39

## 2021-06-04 NOTE — CASE MANAGEMENT
CM follow up  CM spoke with spouse to discuss transition to LTC  Family is seeking LTC facilities where Pt can transfer  PT rec for STR rehab  Family had chosen [facility]  CM provided Family with financial application as requested by facility  Family is now leaning toward [facility] as they are aware [facility] accepts Medicaid  Referral in for [facility]  Once approved, plan is for Pt to discharge to STR then transition to LTC  CM will continue to follow Pt to assess for additional needs in anticipation of discharge

## 2021-06-04 NOTE — ASSESSMENT & PLAN NOTE
Lab Results   Component Value Date    HGBA1C 9 2 (A) 10/13/2020     Results from last 7 days   Lab Units 06/04/21  1124 06/04/21  0605 06/03/21  2130 06/03/21  1710 06/03/21  1600 06/03/21  1137 06/03/21  0652 06/02/21  2114   POC GLUCOSE mg/dl 210* 128 198* 221* 68 165* 124 139     · Diabetes mellitus with hyperglycemia on admission  Currently on increase glargine but standing lispro 6 units t i d    · Holding glimepiride and acarbose----> discontinue acarbose upon discharge

## 2021-06-04 NOTE — PROGRESS NOTES
Pt is incontinent of urine  Seen by wound care  pt has MASD fungal rash in the groin  External urinary catheter applied by RN upon discussion with wound care nurse

## 2021-06-04 NOTE — PROGRESS NOTES
[address] [facility]  Progress Note - Mariana Green 1935, 80 y o  male MRN: [id number]  Unit/Bed#: E2 -01 Encounter: [id number]  Primary Care Provider: Kelly Torres MD   Date and time admitted to hospital: 6/2/2021  1:36 PM    Type 2 diabetes mellitus with stage 3 chronic kidney disease, with long-term current use of insulin [facility])  Assessment & Plan  Lab Results   Component Value Date    HGBA1C 9 2 (A) 10/13/2020     Results from last 7 days   Lab Units 06/04/21  1124 06/04/21  0605 06/03/21  2130 06/03/21  1710 06/03/21  1600 06/03/21  1137 06/03/21  0652 06/02/21  2114   POC GLUCOSE mg/dl 210* 128 198* 221* 68 165* 124 139     · Diabetes mellitus with hyperglycemia on admission  Currently on increase glargine but standing lispro 6 units t i d  · Holding glimepiride and acarbose----> discontinue acarbose upon discharge    Anxiety disorder  Assessment & Plan  · Mood stable continue sertraline    Essential hypertension  Assessment & Plan  · Continue losartan    Mixed hyperlipidemia  Assessment & Plan  · Continue pravastatin    * Dementia without behavioral disturbance (HCC)  Assessment & Plan  · Dementia with ambulatory dysfunction  Spouse unable to care for him anymore  · PT/OT recommending rehab  Family agreeable  VTE Pharmacologic Prophylaxis:   Pharmacologic: Enoxaparin (Lovenox)  Mechanical VTE Prophylaxis in Place: Yes    Patient Centered Rounds: I have performed bedside rounds with nursing staff today  Discussions with Specialists or Other Care Team Provider:  None    Education and Discussions with Family / Patient:  No new updates for family, patient has been medically stable for discharge for several days pending insurance authorization    Time Spent for Care: 15 minutes  More than 50% of total time spent on counseling and coordination of care as described above      Current Length of Stay: 2 day(s)    Current Patient Status: Inpatient   Certification Statement: The patient will continue to require additional inpatient hospital stay due to Please refer to above    Discharge Plan:  Medically stable to discharge to rehab when bed available    Code Status: Level 3 - DNAR and DNI      Subjective:   Patient reports feeling well  He denies concerns or complaints to me  Pleasantly confused at baseline    Objective:     Vitals:   Temp (24hrs), Av 8 °F (36 6 °C), Min:97 °F (36 1 °C), Max:98 4 °F (36 9 °C)    Temp:  [97 °F (36 1 °C)-98 4 °F (36 9 °C)] 98 °F (36 7 °C)  HR:  [71-95] 71  Resp:  [18] 18  BP: (130-160)/(69-84) 160/76  SpO2:  [95 %-97 %] 97 %  Body mass index is 27 25 kg/m²  Input and Output Summary (last 24 hours):     No intake or output data in the 24 hours ending 21 1334    Physical Exam:     Physical Exam  Vitals signs and nursing note reviewed  Constitutional:       Appearance: He is not diaphoretic  HENT:      Head: Normocephalic and atraumatic  Eyes:      General:         Right eye: No discharge  Left eye: No discharge  Neck:      Musculoskeletal: Neck supple  Cardiovascular:      Rate and Rhythm: Normal rate and regular rhythm  Heart sounds: No murmur  Pulmonary:      Effort: Pulmonary effort is normal  No respiratory distress  Breath sounds: No wheezing or rales  Abdominal:      General: There is no distension  Tenderness: There is no abdominal tenderness  There is no guarding  Musculoskeletal:         General: No swelling  Neurological:      Mental Status: He is alert        Comments: Oriented to self, pleasantly confused   Psychiatric:      Comments: Pleasantly confused         Additional Data:     Labs:    Results from last 7 days   Lab Units 21  0503 21  1413   WBC Thousand/uL 9 76 8 40   HEMOGLOBIN g/dL 15 1 14 9   HEMATOCRIT % 45 7 44 1   PLATELETS Thousands/uL 258 275   NEUTROS PCT %  --  76*   LYMPHS PCT %  --  9*   MONOS PCT %  --  11   EOS PCT %  --  2     Results from last 7 days   Lab Units 06/03/21  0503 06/02/21  1413   POTASSIUM mmol/L 3 9 4 1   CHLORIDE mmol/L 104 99*   CO2 mmol/L 29 30   BUN mg/dL 29* 25   CREATININE mg/dL 1 25 1 33*   CALCIUM mg/dL 9 2 9 0   ALK PHOS U/L  --  105   ALT U/L  --  25   AST U/L  --  22           * I Have Reviewed All Lab Data Listed Above  * Additional Pertinent Lab Tests Reviewed: Nba 66 Admission Reviewed    Imaging:    Imaging Reports Reviewed Today Include:  Images done during hospitalization  Imaging Personally Reviewed by Myself Includes:  None    Recent Cultures (last 7 days):           Last 24 Hours Medication List:   Current Facility-Administered Medications   Medication Dose Route Frequency Provider Last Rate    aspirin  81 mg Oral Daily Yarely Loyd MD      enoxaparin  40 mg Subcutaneous Daily Yarely Loyd MD      insulin glargine  20 Units Subcutaneous HS Yarely Loyd MD      insulin lispro  1-5 Units Subcutaneous TID [facility] Yarely Loyd MD      insulin lispro  6 Units Subcutaneous Daily With Breakfast Yarely Loyd MD      insulin lispro  6 Units Subcutaneous Daily With Lunch Yarely Loyd MD      insulin lispro  6 Units Subcutaneous Daily With Samantha Sanchez MD      losartan  100 mg Oral Daily Yarely Loyd MD      melatonin  3 mg Oral HS Yarely Loyd MD      Reynaldo Shinlata ANTIFUNGAL   Topical BID Haylee West MD      nystatin   Topical BID Yarely Loyd MD      pravastatin  40 mg Oral Daily With Samantha Sanchez MD      sertraline  50 mg Oral Daily Yarely Loyd MD          Today, Patient Was Seen By: Haylee West MD    ** Please Note: Dragon 360 Dictation voice to text software may have been used in the creation of this document   **

## 2021-06-04 NOTE — PLAN OF CARE
Problem: PHYSICAL THERAPY ADULT  Goal: Performs mobility at highest level of function for planned discharge setting  See evaluation for individualized goals  Description: Treatment/Interventions: Functional transfer training, LE strengthening/ROM, Elevations, Therapeutic exercise, Cognitive reorientation, Patient/family training, Equipment eval/education, Bed mobility, Gait training, Continued evaluation, Spoke to nursing, Family, OT, Spoke to case management          See flowsheet documentation for full assessment, interventions and recommendations  Note: Prognosis: Fair  Problem List: Decreased strength, Impaired balance, Decreased mobility, Decreased cognition, Impaired judgement, Decreased safety awareness, Decreased coordination  Assessment: Andrez Simmons was seen per PT POC  Presents w worsening cognition this session, like 2* sundowning effect  As a result w impaired processing and motor planning  Fluctuating level of A d/t same  Initially able to perform mobility tasks w Ax1, however given significant unsteadiness, impaired righting reactions and cognition impacting processing require Ax2 for safety in subsequent transf and ambulation  End of session pt repositioned in bed, alarm engaged and family present  Would continue to benefit from STR given worsening function, caregiver burden and high fall risk preventing safe dc home  Barriers to Discharge: Decreased caregiver support  Barriers to Discharge Comments: caregiver burden     PT Discharge Recommendation: Post acute rehabilitation services(may benefit from LTC pending progress)     PT - OK to Discharge: Yes    See flowsheet documentation for full assessment

## 2021-06-04 NOTE — PHYSICAL THERAPY NOTE
PHYSICAL THERAPY NOTE          Patient Name: Michelle Mendes  WQNLE'R Date: 6/4/2021   Time: 5465-9370       06/04/21 1451   PT Last Visit   PT Visit Date 06/04/21   Note Type   Note Type Treatment   Pain Assessment   Pain Assessment Tool Pain Assessment not indicated - pt denies pain   Pain Score No Pain   Restrictions/Precautions   Other Precautions Cognitive; Chair Alarm; Bed Alarm;Multiple lines; Fall Risk   General   Chart Reviewed Yes   Response to Previous Treatment Patient unable to report, no changes reported from family or staff   Family/Caregiver Present Yes  (wife and dtr)   Cognition   Overall Cognitive Status Impaired   Arousal/Participation Cooperative   Attention Attends with cues to redirect   Orientation Level Oriented to person;Disoriented to place; Disoriented to time;Disoriented to situation   Memory Decreased recall of precautions;Decreased recall of recent events;Decreased short term memory;Decreased long term memory   Following Commands Follows one step commands with increased time or repetition   Comments worsening cognition compared to prev session, likely 2* sundowning  continues w difficulty processing/ motor planning  pleasantly confused   Subjective   Subjective pt agreeable to therapy   Bed Mobility   Supine to Sit 5  Supervision   Additional items HOB elevated; Bedrails; Increased time required;Verbal cues   Sit to Supine 3  Moderate assistance   Additional items Assist x 2; Increased time required;Verbal cues;LE management; Other  (trunk support)   Transfers   Sit to Stand 3  Moderate assistance   Additional items Assist x 1; Increased time required;Verbal cues; Other  (RW)   Stand to Sit 2  Maximal assistance   Additional items Increased time required;Verbal cues; Other;Assist x 1  (RW)   Additional Comments fluctuating level of assist during session  Initially mod Ax1 to stand and max Ax1 to sit   on subsequent transf min Ax2 to stand and min Ax2 to sit   Ambulation/Elevation   Gait pattern Poor UE support; Improper Weight shift;Redundant gait at times; Ataxia   Gait Assistance 3  Moderate assist   Additional items Assist x 2; Tactile cues; Verbal cues  (tactile cues to wt shift, guide pt, upright support)   Assistive Device Rolling walker   Distance 3'x2   Balance   Static Sitting Fair -   Dynamic Sitting Poor +   Static Standing Poor +   Dynamic Standing Poor   Ambulatory Poor   Activity Tolerance   Activity Tolerance Treatment limited secondary to medical complications (Comment); Other (Comment)  (cognition)   Medical Staff Made Aware PCA present to assist w mobility   Nurse Made Aware Kelly RN   Assessment   Prognosis Fair   Problem List Decreased strength; Impaired balance;Decreased mobility; Decreased cognition; Impaired judgement;Decreased safety awareness;Decreased coordination   Assessment Serena Knife was seen per PT POC  Presents w worsening cognition this session, like 2* sundowning effect  As a result w impaired processing and motor planning  Fluctuating level of A d/t same  Initially able to perform mobility tasks w Ax1, however given significant unsteadiness, impaired righting reactions and cognition impacting processing require Ax2 for safety in subsequent transf and ambulation  End of session pt repositioned in bed, alarm engaged and family present  Would continue to benefit from STR given worsening function, caregiver burden and high fall risk preventing safe dc home  Barriers to Discharge Decreased caregiver support   Barriers to Discharge Comments caregiver burden   Goals   Patient Goals none stated 2* cognition   STG Expiration Date 06/17/21   Short Term Goal #1 1)  Pt will perform bed mobility from flat bed with S demonstrating appropriate technique 100% of the time in order to improve function  2)  Perform all transfers with S demonstrating safe and appropriate technique 100% of the time in order to improve ability to negotiate safely in home environment  3) Amb with least restrictive AD > 50'x1 with S in order to demonstrate ability to negotiate in home environment  4)  Improve overall strength and balance 1/2 grade in order to optimize ability to perform functional tasks and reduce fall risk  5) Increase activity tolerance to 45 minutes in order to improve endurance to functional tasks  6)  Negotiate stairs using most appropriate technique and min A in order to be able to negotiate safely in home environment  7) PT for ongoing patient and family/caregiver education, DME needs and d/c planning in order to promote highest level of function in least restrictive environment  PT Treatment Day 2   Plan   Treatment/Interventions Functional transfer training;LE strengthening/ROM; Elevations; Therapeutic exercise;Cognitive reorientation;Patient/family training;Equipment eval/education; Bed mobility;Gait training;Continued evaluation;Spoke to nursing;Family   Progress Slow progress, cognitive deficits   PT Frequency Other (Comment)  (4-5x)   Recommendation   PT Discharge Recommendation Post acute rehabilitation services  (may benefit from LTC pending progress)   PT - OK to Discharge Yes   Additional Comments The patient's AM-PAC Basic Mobility Inpatient Short Form Raw Score is 12, Standardized Score is 32 23  A standardized score less than 42 9 suggests the patient may benefit from discharge to post-acute rehabilitation services  Please also refer to the recommendation of the Physical Therapist for safe discharge planning     AM-PAC Basic Mobility Inpatient   Turning in Bed Without Bedrails 3   Lying on Back to Sitting on Edge of Flat Bed 2   Moving Bed to Chair 2   Standing Up From Chair 2   Walk in Room 2   Climb 3-5 Stairs 1   Basic Mobility Inpatient Raw Score 12   Basic Mobility Standardized Score 32 23     Delmis Bates, PT

## 2021-06-04 NOTE — WOUND OSTOMY CARE
Consult Note - Wound   Sidonie Must 80 y o  male MRN: [id number]  Unit/Bed#: E2 -01 Encounter: [id number]      History and Present Illness:  Patient is seen for skin assessment  Patient examined in bed and is able to turn with minimal assistance for full exam  Patient is incontinent  Assessment Findings:   1  POA stage 2 pressure injury on right buttock with surrounding fungal rash        2  MASD with fungal rash on groin    Heels intact      See flowsheets for details    Skin care plans:  1-Apply NIRAV to sacrum, buttocks, groin BID and PRN  2-Hydraguard to bilateral heel BID and PRN  3-Elevate heels to offload pressure  4-Ehob cushion when out of bed  5-Turn/repoisiton q2h or when medically stable for pressure re-distribution on skin  6-Moisturize skin daily with skin nourishing cream       Call or tigertext with any questions  Wound Care will continue to follow    Wound 06/02/21 Pressure Injury Sacrum (Active)   Wound Image   06/04/21 0954   Wound Description Fragile;Pink; White;Yellow 06/04/21 0954   Pressure Injury Stage 2 06/04/21 0954   [place]-wound Assessment Maceration;Rash 06/04/21 0954   Wound Length (cm) 1 cm 06/04/21 0954   Wound Width (cm) 1 4 cm 06/04/21 0954   Wound Surface Area (cm^2) 1 4 cm^2 06/04/21 0954   Treatments Site care 06/03/21 2252   Dressing Moisture barrier 06/04/21 0954   Dressing Status Clean;Dry; Intact 06/03/21 2252

## 2021-06-05 LAB
GLUCOSE SERPL-MCNC: 128 MG/DL (ref 65–140)
GLUCOSE SERPL-MCNC: 193 MG/DL (ref 65–140)
GLUCOSE SERPL-MCNC: 242 MG/DL (ref 65–140)

## 2021-06-05 PROCEDURE — 97535 SELF CARE MNGMENT TRAINING: CPT

## 2021-06-05 PROCEDURE — 82948 REAGENT STRIP/BLOOD GLUCOSE: CPT

## 2021-06-05 PROCEDURE — 97530 THERAPEUTIC ACTIVITIES: CPT

## 2021-06-05 PROCEDURE — 99232 SBSQ HOSP IP/OBS MODERATE 35: CPT | Performed by: INTERNAL MEDICINE

## 2021-06-05 RX ADMIN — INSULIN LISPRO 2 UNITS: 100 INJECTION, SOLUTION INTRAVENOUS; SUBCUTANEOUS at 17:27

## 2021-06-05 RX ADMIN — ENOXAPARIN SODIUM 40 MG: 40 INJECTION SUBCUTANEOUS at 09:54

## 2021-06-05 RX ADMIN — NYSTATIN: 100000 POWDER TOPICAL at 17:27

## 2021-06-05 RX ADMIN — INSULIN GLARGINE 20 UNITS: 100 INJECTION, SOLUTION SUBCUTANEOUS at 22:35

## 2021-06-05 RX ADMIN — MICONAZOLE NITRATE: 20 CREAM TOPICAL at 17:27

## 2021-06-05 RX ADMIN — ASPIRIN 81 MG: 81 TABLET, CHEWABLE ORAL at 09:54

## 2021-06-05 RX ADMIN — INSULIN LISPRO 6 UNITS: 100 INJECTION, SOLUTION INTRAVENOUS; SUBCUTANEOUS at 12:44

## 2021-06-05 RX ADMIN — MICONAZOLE NITRATE: 20 CREAM TOPICAL at 09:54

## 2021-06-05 RX ADMIN — SERTRALINE HYDROCHLORIDE 50 MG: 50 TABLET ORAL at 09:54

## 2021-06-05 RX ADMIN — PRAVASTATIN SODIUM 40 MG: 40 TABLET ORAL at 17:27

## 2021-06-05 RX ADMIN — INSULIN LISPRO 6 UNITS: 100 INJECTION, SOLUTION INTRAVENOUS; SUBCUTANEOUS at 17:27

## 2021-06-05 RX ADMIN — LOSARTAN POTASSIUM 100 MG: 50 TABLET, FILM COATED ORAL at 09:54

## 2021-06-05 RX ADMIN — INSULIN LISPRO 1 UNITS: 100 INJECTION, SOLUTION INTRAVENOUS; SUBCUTANEOUS at 12:44

## 2021-06-05 RX ADMIN — INSULIN LISPRO 6 UNITS: 100 INJECTION, SOLUTION INTRAVENOUS; SUBCUTANEOUS at 07:00

## 2021-06-05 RX ADMIN — INSULIN LISPRO 1 UNITS: 100 INJECTION, SOLUTION INTRAVENOUS; SUBCUTANEOUS at 07:00

## 2021-06-05 RX ADMIN — NYSTATIN: 100000 POWDER TOPICAL at 09:54

## 2021-06-05 RX ADMIN — MELATONIN 3 MG: at 22:35

## 2021-06-05 NOTE — PLAN OF CARE
Problem: Potential for Falls  Goal: Patient will remain free of falls  Description: INTERVENTIONS:  - Assess patient frequently for physical needs  -  Identify cognitive and physical deficits and behaviors that affect risk of falls  -  [place] fall precautions as indicated by assessment   - Educate patient/family on patient safety including physical limitations  - Instruct patient to call for assistance with activity based on assessment  - Modify environment to reduce risk of injury  - Consider OT/PT consult to assist with strengthening/mobility  Outcome: Progressing     Problem: Nutrition/Hydration-ADULT  Goal: Nutrient/Hydration intake appropriate for improving, restoring or maintaining nutritional needs  Description: Monitor and assess patient's nutrition/hydration status for malnutrition  Collaborate with interdisciplinary team and initiate plan and interventions as ordered  Monitor patient's weight and dietary intake as ordered or per policy  Utilize nutrition screening tool and intervene as necessary  Determine patient's food preferences and provide high-protein, high-caloric foods as appropriate       INTERVENTIONS:  - Monitor oral intake, urinary output, labs, and treatment plans  - Assess nutrition and hydration status and recommend course of action  - Evaluate amount of meals eaten  - Assist patient with eating if necessary   - Allow adequate time for meals  - Recommend/ encourage appropriate diets, oral nutritional supplements, and vitamin/mineral supplements  - Order, calculate, and assess calorie counts as needed  - Recommend, monitor, and adjust tube feedings and TPN/PPN based on assessed needs  - Assess need for intravenous fluids  - Provide specific nutrition/hydration education as appropriate  - Include patient/family/caregiver in decisions related to nutrition  Outcome: Progressing     Problem: Prexisting or High Potential for Compromised Skin Integrity  Goal: Skin integrity is maintained or improved  Description: INTERVENTIONS:  - Identify patients at risk for skin breakdown  - Assess and monitor skin integrity  - Assess and monitor nutrition and hydration status  - Monitor labs   - Assess for incontinence   - Turn and reposition patient  - Assist with mobility/ambulation  - Relieve pressure over bony prominences  - Avoid friction and shearing  - Provide appropriate hygiene as needed including keeping skin clean and dry  - Evaluate need for skin moisturizer/barrier cream  - Collaborate with interdisciplinary team   - Patient/family teaching  - Consider wound care consult   Outcome: Progressing     Problem: PAIN - ADULT  Goal: Verbalizes/displays adequate comfort level or baseline comfort level  Description: Interventions:  - Encourage patient to monitor pain and request assistance  - Assess pain using appropriate pain scale  - Administer analgesics based on type and severity of pain and evaluate response  - Implement non-pharmacological measures as appropriate and evaluate response  - Consider cultural and social influences on pain and pain management  - Notify physician/advanced practitioner if interventions unsuccessful or patient reports new pain  Outcome: Progressing     Problem: INFECTION - ADULT  Goal: Absence or prevention of progression during hospitalization  Description: INTERVENTIONS:  - Assess and monitor for signs and symptoms of infection  - Monitor lab/diagnostic results  - Monitor all insertion sites, i e  indwelling lines, tubes, and drains  - Monitor endotracheal if appropriate and nasal secretions for changes in amount and color  - [place] appropriate cooling/warming therapies per order  - Administer medications as ordered  - Instruct and encourage patient and family to use good hand hygiene technique  - Identify and instruct in appropriate isolation precautions for identified infection/condition  Outcome: Progressing  Goal: Absence of fever/infection during neutropenic period  Description: INTERVENTIONS:  - Monitor WBC    Outcome: Progressing     Problem: SAFETY ADULT  Goal: Patient will remain free of falls  Description: INTERVENTIONS:  - Assess patient frequently for physical needs  -  Identify cognitive and physical deficits and behaviors that affect risk of falls    -  [place] fall precautions as indicated by assessment   - Educate patient/family on patient safety including physical limitations  - Instruct patient to call for assistance with activity based on assessment  - Modify environment to reduce risk of injury  - Consider OT/PT consult to assist with strengthening/mobility  Outcome: Progressing  Goal: Maintain or return to baseline ADL function  Description: INTERVENTIONS:  -  Assess patient's ability to carry out ADLs; assess patient's baseline for ADL function and identify physical deficits which impact ability to perform ADLs (bathing, care of mouth/teeth, toileting, grooming, dressing, etc )  - Assess/evaluate cause of self-care deficits   - Assess range of motion  - Assess patient's mobility; develop plan if impaired  - Assess patient's need for assistive devices and provide as appropriate  - Encourage maximum independence but intervene and supervise when necessary  - Involve family in performance of ADLs  - Assess for home care needs following discharge   - Consider OT consult to assist with ADL evaluation and planning for discharge  - Provide patient education as appropriate  Outcome: Progressing  Goal: Maintain or return mobility status to optimal level  Description: INTERVENTIONS:  - Assess patient's baseline mobility status (ambulation, transfers, stairs, etc )    - Identify cognitive and physical deficits and behaviors that affect mobility  - Identify mobility aids required to assist with transfers and/or ambulation (gait belt, sit-to-stand, lift, walker, cane, etc )  - [place] fall precautions as indicated by assessment  - Record patient progress and toleration of activity level on Mobility SBAR; progress patient to next Phase/Stage  - Instruct patient to call for assistance with activity based on assessment  - Consider rehabilitation consult to assist with strengthening/weightbearing, etc   Outcome: Progressing     Problem: DISCHARGE PLANNING  Goal: Discharge to home or other facility with appropriate resources  Description: INTERVENTIONS:  - Identify barriers to discharge w/patient and caregiver  - Arrange for needed discharge resources and transportation as appropriate  - Identify discharge learning needs (meds, wound care, etc )  - Arrange for interpretive services to assist at discharge as needed  - Refer to Case Management Department for coordinating discharge planning if the patient needs post-hospital services based on physician/advanced practitioner order or complex needs related to functional status, cognitive ability, or social support system  Outcome: Progressing     Problem: Knowledge Deficit  Goal: Patient/family/caregiver demonstrates understanding of disease process, treatment plan, medications, and discharge instructions  Description: Complete learning assessment and assess knowledge base    Interventions:  - Provide teaching at level of understanding  - Provide teaching via preferred learning methods  Outcome: Progressing     Problem: NEUROSENSORY - ADULT  Goal: Achieves stable or improved neurological status  Description: INTERVENTIONS  - Monitor and report changes in neurological status  - Monitor vital signs such as temperature, blood pressure, glucose, and any other labs ordered   - Initiate measures to prevent increased intracranial pressure  - Monitor for seizure activity and implement precautions if appropriate      Outcome: Progressing  Goal: Remains free of injury related to seizures activity  Description: INTERVENTIONS  - Maintain airway, patient safety  and administer oxygen as ordered  - Monitor patient for seizure activity, document and report duration and description of seizure to physician/advanced practitioner  - If seizure occurs,  ensure patient safety during seizure  - Reorient patient post seizure  - Seizure pads on all 4 side rails  - Instruct patient/family to notify RN of any seizure activity including if an aura is experienced  - Instruct patient/family to call for assistance with activity based on nursing assessment  - Administer anti-seizure medications if ordered    Outcome: Progressing  Goal: Achieves maximal functionality and self care  Description: INTERVENTIONS  - Monitor swallowing and airway patency with patient fatigue and changes in neurological status  - Encourage and assist patient to increase activity and self care     - Encourage visually impaired, hearing impaired and aphasic patients to use assistive/communication devices  Outcome: Progressing     Problem: METABOLIC, FLUID AND ELECTROLYTES - ADULT  Goal: Electrolytes maintained within normal limits  Description: INTERVENTIONS:  - Monitor labs and assess patient for signs and symptoms of electrolyte imbalances  - Administer electrolyte replacement as ordered  - Monitor response to electrolyte replacements, including repeat lab results as appropriate  - Instruct patient on fluid and nutrition as appropriate  Outcome: Progressing  Goal: Fluid balance maintained  Description: INTERVENTIONS:  - Monitor labs   - Monitor I/O and WT  - Instruct patient on fluid and nutrition as appropriate  - Assess for signs & symptoms of volume excess or deficit  Outcome: Progressing  Goal: Glucose maintained within target range  Description: INTERVENTIONS:  - Monitor Blood Glucose as ordered  - Assess for signs and symptoms of hyperglycemia and hypoglycemia  - Administer ordered medications to maintain glucose within target range  - Assess nutritional intake and initiate nutrition service referral as needed  Outcome: Progressing     Problem: MUSCULOSKELETAL - ADULT  Goal: Maintain or return mobility to safest level of function  Description: INTERVENTIONS:  - Assess patient's ability to carry out ADLs; assess patient's baseline for ADL function and identify physical deficits which impact ability to perform ADLs (bathing, care of mouth/teeth, toileting, grooming, dressing, etc )  - Assess/evaluate cause of self-care deficits   - Assess range of motion  - Assess patient's mobility  - Assess patient's need for assistive devices and provide as appropriate  - Encourage maximum independence but intervene and supervise when necessary  - Involve family in performance of ADLs  - Assess for home care needs following discharge   - Consider OT consult to assist with ADL evaluation and planning for discharge  - Provide patient education as appropriate  Outcome: Progressing  Goal: Maintain proper alignment of affected body part  Description: INTERVENTIONS:  - Support, maintain and protect limb and body alignment  - Provide patient/ family with appropriate education  Outcome: Progressing

## 2021-06-05 NOTE — CASE MANAGEMENT
W/E CM received a TT from MD inquiring about pt's SNF referrals to [facility] and [facility]  Looked up CM notes and a financial application for [facility] was given to family, but not yet returned  [facility]aleb ran [employer] and according to the [address] note from them, it looked as though pt's insurance became effective AND termed on the same day, 1/1/21  CM sent via [facility] a copy of pt's facesheet to make sure they have the correct insurance information  Requested they call this CM directly with an update since an [facility] response will not automatically be sent to this CM  Updated MD Beverly Jimenes responded that they ran insurance again, and it is indeed active  They offered pt a bed but they requested a LT application be completed before they physically accept pt  Application was emailed to this CM and 3 copies were printed off for wife  Wife explained that she has to obtain the financial information from their  on Monday as she only has general figures in her head  Made her aware that [facility] is requesting it be completed BEFORE they receive him in house  In addition, an auth would need to be submitted for before d/c and [facility]'s liaison doesn't have the ability to do that remotely on the weekends, but he can submit on Monday when he is back in the office  This CM no longer has any active log-ins to submit for auth  CM hand-off will be left for assigned CM to send updated notes for a Monday submission  TT therapy to see pt on Sunday so notes are in there for Monday

## 2021-06-05 NOTE — ASSESSMENT & PLAN NOTE
Lab Results   Component Value Date    HGBA1C 9 2 (A) 10/13/2020     Results from last 7 days   Lab Units 06/05/21  0600 06/04/21  2105 06/04/21  1623 06/04/21  1124 06/04/21  0605 06/03/21  2130 06/03/21  1710 06/03/21  1600   POC GLUCOSE mg/dl 193* 258* 92 210* 128 198* 221* 68     · Diabetes mellitus with hyperglycemia on admission  Currently on increase glargine but standing lispro 6 units t i d  blood glucose fairly controlled  Will monitor to make further adjustments     · Holding glimepiride and acarbose----> discontinue acarbose upon discharge

## 2021-06-05 NOTE — ASSESSMENT & PLAN NOTE
· Dementia with ambulatory dysfunction  Spouse unable to care for him anymore  · PT/OT recommending rehab  Family agreeable  · Family currently looking at [facility] for str

## 2021-06-05 NOTE — ASSESSMENT & PLAN NOTE
Lab Results   Component Value Date    EGFR 52 06/03/2021    EGFR 48 06/02/2021    EGFR 41 04/09/2021    CREATININE 1 25 06/03/2021    CREATININE 1 33 (H) 06/02/2021    CREATININE 1 53 (H) 04/09/2021     Stable at baseline

## 2021-06-05 NOTE — PLAN OF CARE
Problem: OCCUPATIONAL THERAPY ADULT  Goal: Performs self-care activities at highest level of function for planned discharge setting  See evaluation for individualized goals  Description: Treatment Interventions: ADL retraining, Functional transfer training, UE strengthening/ROM, Cognitive reorientation, Endurance training, Patient/family training, Equipment evaluation/education, Compensatory technique education, Energy conservation, Activityengagement          See flowsheet documentation for full assessment, interventions and recommendations  Outcome: Progressing  Note: Limitation: Decreased ADL status, Decreased cognition, Decreased UE strength, Decreased Safe judgement during ADL, Decreased endurance, Decreased self-care trans, Decreased high-level ADLs  Prognosis: Good  Assessment: Pt was seen for skilled OT with focus on completion of self care tasks, bed mobility, functional transfers, review of call bell orientation, completion of the [place] Cognitive Level Screening and review of current plan of care  Pt required close communication in central visual field to achieve carry over of safety with functional tasks, movements  Pt required Min A overall to achieve EOB positioning in bedside chair with support of RW  No LOB noted with functional tasks instance  Pt's spouse and son were present during tx session  Pt scored 3 4/6 0 on the ACLS indicating 24 hour care is recommended to sequence through routine steps of toileting, bathing, grooming and dressing  Medications: May recognize medications by color or shape when it is given daily but does not note amounts or time of day  Does not understand purpose of medications or side effects, may mistake for candy  Prescription bottles need to be child proof  Store medications in secure location away from patient  Pre-measured medications should be handed to the patient  Driving: Should not operate a motor vehicle   Information was provided to Pt's family regarding behaviors associated with score  Pt's family reports hoping for Pt to gain strength and independence with all functional tasks  The patient's raw score on the AM-PAC Daily Activity inpatient short form is 14, standardized score is 33 39, less than 39 4  Patients at this level are likely to benefit from discharge to post-acute rehabilitation services  Please refer to the recommendation of the Occupational Therapist for safe discharge planning    Recommendation: Geriatric Consult  OT Discharge Recommendation: Post acute rehabilitation services(with potential increase in care/supportive environment  )  OT - OK to Discharge: Yes(when medicallly cleared  )

## 2021-06-05 NOTE — OCCUPATIONAL THERAPY NOTE
Occupational Therapy Treatment Note:         06/05/21 1255   OT Last Visit   OT Visit Date 06/05/21   Note Type   Note Type Treatment   Restrictions/Precautions   Weight Bearing Precautions Per Order No   Other Precautions Cognitive; Bed Alarm; Chair Alarm;Pain; Fall Risk   Pain Assessment   Pain Assessment Tool 0-10   Pain Score No Pain   Pain Location/Orientation Location: Hip   ADL   Where Assessed Chair   Grooming Assistance 5  Supervision/Setup   Grooming Deficit Setup   LB Dressing Assistance 3  Moderate Assistance   LB Dressing Deficit Setup;Steadying; Requires assistive device for steadying; Increased time to complete;Supervision/safety;Verbal cueing; Don/doff L sock; Don/doff R sock   LB Dressing Comments increased A due to limited focus to tasks and poor safety awareness  Functional Standing Tolerance   Time 1-2 mins   Activity dynamic stand balance activities  Comments Pt able to tolerate activities with hands on A for safety  Bed Mobility   Supine to Sit 4  Minimal assistance   Additional items Assist x 1;HOB elevated; Bedrails; Increased time required;Verbal cues;LE management   Additional Comments increased A required for safety  Transfers   Sit to Stand 4  Minimal assistance   Additional items Assist x 1; Armrests; Increased time required;Verbal cues   Stand to Sit 4  Minimal assistance   Additional items Assist x 1; Armrests; Increased time required;Verbal cues   Stand pivot 4  Minimal assistance   Additional items Assist x 1; Armrests; Increased time required;Verbal cues   Toilet transfer 4  Minimal assistance   Additional items Assist x 1;Verbal cues; Increased time required;Commode;Armrests; Bedrails   Additional Comments cues for safe hand placement and footing required  Functional Mobility   Functional Mobility 4  Minimal assistance   Additional Comments x1   Additional items Rolling walker   Cognition   Overall Cognitive Status Impaired   Arousal/Participation Responsive; Cooperative   Attention Attends with cues to redirect   Orientation Level Oriented to person;Oriented to time;Oriented to place   Memory Decreased short term memory;Decreased recall of recent events;Decreased recall of precautions   Following Commands Follows one step commands without difficulty   Comments Pt with increased distractibility   Cognition Assessment Tools ACLS   Score 3 4   Additional Activities   Additional Activities Other (Comment)  (reviewed current plan of care/call bell orientation, )   Additional Activities Comments Pt's family report having good understanding  Activity Tolerance   Activity Tolerance Patient tolerated treatment well   Medical Staff Made Aware reported all findings to nursing staff  Pt OOB in bedside chair with chair alarm activated  Assessment   Assessment Pt was seen for skilled OT with focus on completion of self care tasks, bed mobility, functional transfers, review of call bell orientation, completion of the [place] Cognitive Level Screening and review of current plan of care  Pt required close communication in central visual field to achieve carry over of safety with functional tasks, movements  Pt required Min A overall to achieve EOB positioning in bedside chair with support of RW  No LOB noted with functional tasks instance  Pt's spouse and son were present during tx session  Pt scored 3 4/6 0 on the ACLS indicating 24 hour care is recommended to sequence through routine steps of toileting, bathing, grooming and dressing  Medications: May recognize medications by color or shape when it is given daily but does not note amounts or time of day  Does not understand purpose of medications or side effects, may mistake for candy  Prescription bottles need to be child proof  Store medications in secure location away from patient  Pre-measured medications should be handed to the patient  Driving: Should not operate a motor vehicle   Information was provided to Pt's family regarding behaviors associated with score  Pt's family reports hoping for Pt to gain strength and independence with all functional tasks  The patient's raw score on the AM-PAC Daily Activity inpatient short form is 14, standardized score is 33 39, less than 39 4  Patients at this level are likely to benefit from discharge to post-acute rehabilitation services  Please refer to the recommendation of the Occupational Therapist for safe discharge planning  Plan   Treatment Interventions ADL retraining;Functional transfer training;UE strengthening/ROM; Cognitive reorientation; Endurance training;Patient/family training   Goal Expiration Date 06/17/21   OT Treatment Day 1   OT Frequency 3-5x/wk   Recommendation   Recommendation Geriatric Consult   OT Discharge Recommendation Post acute rehabilitation services  (with potential increase in care/supportive environment  )   OT - OK to Discharge Yes  (when medicallly cleared   )   [facility] Daily Activity Inpatient   Lower Body Dressing 2   Bathing 2   Toileting 2   Upper Body Dressing 2   Grooming 3   Eating 3   Daily Activity Raw Score 14   Daily Activity Standardized Score (Calc for Raw Score >=11) 33 39   AM-[facility] Applied Cognition Inpatient   Following a Speech/Presentation 1   Understanding Ordinary Conversation 2   Taking Medications 1   Remembering Where Things Are Placed or Put Away 1   Remembering List of 4-5 Errands 1   Taking Care of Complicated Tasks 1   Applied Cognition Raw Score 7   Applied Cognition Standardized Score 15 17   Eneida Christensen, [address]

## 2021-06-05 NOTE — PROGRESS NOTES
[address] [facility]  Progress Note - Smith Milan 1935, 80 y o  male MRN: [id number]  Unit/Bed#: E2 -01 Encounter: [id number]  Primary Care Provider: Katey Brito MD   Date and time admitted to hospital: 6/2/2021  1:36 PM    * Dementia without behavioral disturbance (HCC)  Assessment & Plan  · Dementia with ambulatory dysfunction  Spouse unable to care for him anymore  · PT/OT recommending rehab  Family agreeable  · Family currently looking at [facility] for str  Type 2 diabetes mellitus with stage 3 chronic kidney disease, with long-term current use of insulin [facility])  Assessment & Plan  Lab Results   Component Value Date    HGBA1C 9 2 (A) 10/13/2020     Results from last 7 days   Lab Units 06/05/21  0600 06/04/21  2105 06/04/21  1623 06/04/21  1124 06/04/21  0605 06/03/21  2130 06/03/21  1710 06/03/21  1600   POC GLUCOSE mg/dl 193* 258* 92 210* 128 198* 221* 68     · Diabetes mellitus with hyperglycemia on admission  Currently on increase glargine but standing lispro 6 units t i d  blood glucose fairly controlled  Will monitor to make further adjustments  · Holding glimepiride and acarbose----> discontinue acarbose upon discharge    Anxiety disorder  Assessment & Plan  · Mood stable continue sertraline    Chronic kidney disease, stage 3 [facility])  Assessment & Plan  Lab Results   Component Value Date    EGFR 52 06/03/2021    EGFR 48 06/02/2021    EGFR 41 04/09/2021    CREATININE 1 25 06/03/2021    CREATININE 1 33 (H) 06/02/2021    CREATININE 1 53 (H) 04/09/2021     Stable at baseline  Essential hypertension  Assessment & Plan  · bp is acceptable   will continue losartan    Mixed hyperlipidemia  Assessment & Plan  · Continue pravastatin      VTE Pharmacologic Prophylaxis:   Pharmacologic agent: Enoxaparin (Lovenox)  Mechanical VTE Prophylaxis in Place: Yes    Discussions with Specialists or Other Care Team Provider: reached out to case management to discuss discharge date      Education and Discussions with Family / Patient: spoke to his wife radha and updated her    Time Spent for Care: 20 minutes  More than 50% of total time spent on counseling and coordination of care as described above  Current Length of Stay: 3 day(s)  Current Patient Status: Inpatient     Discharge Plan / Estimated Discharge Date: awaiting str  Medically cleared for discharge  Code Status: Level 3 - DNAR and DNI    Subjective:   Pt seen and examined  Pt was sleeping but awoke very easily  No problems  Objective:     Vitals:   Temp (24hrs), Av °F (36 7 °C), Min:97 3 °F (36 3 °C), Max:98 7 °F (37 1 °C)    Temp:  [97 3 °F (36 3 °C)-98 7 °F (37 1 °C)] 97 3 °F (36 3 °C)  HR:  [67-84] 83  Resp:  [16-18] 16  BP: (143-152)/(82-86) 143/82  SpO2:  [96 %-98 %] 97 %  Body mass index is 27 25 kg/m²  Input and Output Summary (last 24 hours): Intake/Output Summary (Last 24 hours) at 2021 0908  Last data filed at 2021  Gross per 24 hour   Intake --   Output 200 ml   Net -200 ml       Physical Exam:   Physical Exam  Constitutional:       Comments: Sleeping but awakens easily  HENT:      Head: Normocephalic and atraumatic  Eyes:      Extraocular Movements: Extraocular movements intact  Pupils: Pupils are equal, round, and reactive to light  Cardiovascular:      Rate and Rhythm: Normal rate and regular rhythm  Heart sounds: No murmur  No friction rub  No gallop  Pulmonary:      Effort: Pulmonary effort is normal  No respiratory distress  Breath sounds: Normal breath sounds  No wheezing or rales  Abdominal:      General: Bowel sounds are normal  There is no distension  Palpations: Abdomen is soft  Tenderness: There is no abdominal tenderness  There is no guarding  Musculoskeletal:      Right lower leg: No edema  Left lower leg: No edema     Neurological:      Comments: Pleasantly confused oriented x1 person       Additional Data: Labs:  Results from last 7 days   Lab Units 06/03/21  0503 06/02/21  1413   WBC Thousand/uL 9 76 8 40   HEMOGLOBIN g/dL 15 1 14 9   HEMATOCRIT % 45 7 44 1   PLATELETS Thousands/uL 258 275   NEUTROS PCT %  --  76*   LYMPHS PCT %  --  9*   MONOS PCT %  --  11   EOS PCT %  --  2     Results from last 7 days   Lab Units 06/03/21  0503 06/02/21  1413   SODIUM mmol/L 141 139   POTASSIUM mmol/L 3 9 4 1   CHLORIDE mmol/L 104 99*   CO2 mmol/L 29 30   BUN mg/dL 29* 25   CREATININE mg/dL 1 25 1 33*   ANION GAP mmol/L 8 10   CALCIUM mg/dL 9 2 9 0   ALBUMIN g/dL  --  2 5*   TOTAL BILIRUBIN mg/dL  --  0 47   ALK PHOS U/L  --  105   ALT U/L  --  25   AST U/L  --  22   GLUCOSE RANDOM mg/dL 71 335*         Results from last 7 days   Lab Units 06/05/21  0600 06/04/21  2105 06/04/21  1623 06/04/21  1124 06/04/21  0605 06/03/21  2130 06/03/21  1710 06/03/21  1600 06/03/21  1137 06/03/21  0652 06/02/21  2114 06/02/21  1927   POC GLUCOSE mg/dl 193* 258* 92 210* 128 198* 221* 68 165* 124 139 231*                       Lines/Drains:  Invasive Devices     Peripheral Intravenous Line            Peripheral IV 06/02/21 Left;Ventral (anterior) Forearm 3 days                Last 24 Hours Medication List:   Current Facility-Administered Medications   Medication Dose Route Frequency Provider Last Rate    aspirin  81 mg Oral Daily Heather Perez MD      enoxaparin  40 mg Subcutaneous Daily Heather Perez MD      insulin glargine  20 Units Subcutaneous HS Heather Perez MD      insulin lispro  1-5 Units Subcutaneous TID [facility] Heather Perez MD      insulin lispro  6 Units Subcutaneous Daily With Breakfast Heather Perez MD      insulin lispro  6 Units Subcutaneous Daily With Lunch Heather Perez MD      insulin lispro  6 Units Subcutaneous Daily With Matty Yin MD      losartan  100 mg Oral Daily Heather Perez MD      melatonin  3 mg Oral HS MD Tayla Ignacio ANTIFUNGAL   Topical BID Speedy Michael MD      nystatin   Topical BID María Elena Argueta MD      pravastatin  40 mg Oral Daily With Lisa Fraction, MD      sertraline  50 mg Oral Daily María Elena Argueta MD          Today, Patient Was Seen By: Thais Oneal DO

## 2021-06-06 LAB
GLUCOSE SERPL-MCNC: 104 MG/DL (ref 65–140)
GLUCOSE SERPL-MCNC: 121 MG/DL (ref 65–140)
GLUCOSE SERPL-MCNC: 168 MG/DL (ref 65–140)
GLUCOSE SERPL-MCNC: 391 MG/DL (ref 65–140)
GLUCOSE SERPL-MCNC: 68 MG/DL (ref 65–140)

## 2021-06-06 PROCEDURE — 97530 THERAPEUTIC ACTIVITIES: CPT

## 2021-06-06 PROCEDURE — 82948 REAGENT STRIP/BLOOD GLUCOSE: CPT

## 2021-06-06 PROCEDURE — 99232 SBSQ HOSP IP/OBS MODERATE 35: CPT | Performed by: INTERNAL MEDICINE

## 2021-06-06 PROCEDURE — 97116 GAIT TRAINING THERAPY: CPT

## 2021-06-06 PROCEDURE — 97110 THERAPEUTIC EXERCISES: CPT

## 2021-06-06 PROCEDURE — 97535 SELF CARE MNGMENT TRAINING: CPT

## 2021-06-06 RX ADMIN — INSULIN LISPRO 6 UNITS: 100 INJECTION, SOLUTION INTRAVENOUS; SUBCUTANEOUS at 07:30

## 2021-06-06 RX ADMIN — NYSTATIN: 100000 POWDER TOPICAL at 17:29

## 2021-06-06 RX ADMIN — INSULIN LISPRO 1 UNITS: 100 INJECTION, SOLUTION INTRAVENOUS; SUBCUTANEOUS at 16:34

## 2021-06-06 RX ADMIN — MICONAZOLE NITRATE: 20 CREAM TOPICAL at 08:55

## 2021-06-06 RX ADMIN — MELATONIN 3 MG: at 21:33

## 2021-06-06 RX ADMIN — ENOXAPARIN SODIUM 40 MG: 40 INJECTION SUBCUTANEOUS at 08:55

## 2021-06-06 RX ADMIN — ASPIRIN 81 MG: 81 TABLET, CHEWABLE ORAL at 08:54

## 2021-06-06 RX ADMIN — LOSARTAN POTASSIUM 100 MG: 50 TABLET, FILM COATED ORAL at 08:54

## 2021-06-06 RX ADMIN — NYSTATIN: 100000 POWDER TOPICAL at 08:55

## 2021-06-06 RX ADMIN — SERTRALINE HYDROCHLORIDE 50 MG: 50 TABLET ORAL at 08:54

## 2021-06-06 RX ADMIN — MICONAZOLE NITRATE: 20 CREAM TOPICAL at 17:29

## 2021-06-06 RX ADMIN — INSULIN LISPRO 6 UNITS: 100 INJECTION, SOLUTION INTRAVENOUS; SUBCUTANEOUS at 11:22

## 2021-06-06 RX ADMIN — INSULIN LISPRO 6 UNITS: 100 INJECTION, SOLUTION INTRAVENOUS; SUBCUTANEOUS at 16:34

## 2021-06-06 RX ADMIN — INSULIN LISPRO 5 UNITS: 100 INJECTION, SOLUTION INTRAVENOUS; SUBCUTANEOUS at 11:22

## 2021-06-06 RX ADMIN — PRAVASTATIN SODIUM 40 MG: 40 TABLET ORAL at 16:34

## 2021-06-06 NOTE — PLAN OF CARE
Problem: Potential for Falls  Goal: Patient will remain free of falls  Description: INTERVENTIONS:  - Assess patient frequently for physical needs  -  Identify cognitive and physical deficits and behaviors that affect risk of falls  -  [place] fall precautions as indicated by assessment   - Educate patient/family on patient safety including physical limitations  - Instruct patient to call for assistance with activity based on assessment  - Modify environment to reduce risk of injury  - Consider OT/PT consult to assist with strengthening/mobility  Outcome: Progressing     Problem: Nutrition/Hydration-ADULT  Goal: Nutrient/Hydration intake appropriate for improving, restoring or maintaining nutritional needs  Description: Monitor and assess patient's nutrition/hydration status for malnutrition  Collaborate with interdisciplinary team and initiate plan and interventions as ordered  Monitor patient's weight and dietary intake as ordered or per policy  Utilize nutrition screening tool and intervene as necessary  Determine patient's food preferences and provide high-protein, high-caloric foods as appropriate       INTERVENTIONS:  - Monitor oral intake, urinary output, labs, and treatment plans  - Assess nutrition and hydration status and recommend course of action  - Evaluate amount of meals eaten  - Assist patient with eating if necessary   - Allow adequate time for meals  - Recommend/ encourage appropriate diets, oral nutritional supplements, and vitamin/mineral supplements  - Order, calculate, and assess calorie counts as needed  - Recommend, monitor, and adjust tube feedings and TPN/PPN based on assessed needs  - Assess need for intravenous fluids  - Provide specific nutrition/hydration education as appropriate  - Include patient/family/caregiver in decisions related to nutrition  Outcome: Progressing     Problem: Prexisting or High Potential for Compromised Skin Integrity  Goal: Skin integrity is maintained or improved  Description: INTERVENTIONS:  - Identify patients at risk for skin breakdown  - Assess and monitor skin integrity  - Assess and monitor nutrition and hydration status  - Monitor labs   - Assess for incontinence   - Turn and reposition patient  - Assist with mobility/ambulation  - Relieve pressure over bony prominences  - Avoid friction and shearing  - Provide appropriate hygiene as needed including keeping skin clean and dry  - Evaluate need for skin moisturizer/barrier cream  - Collaborate with interdisciplinary team   - Patient/family teaching  - Consider wound care consult   Outcome: Progressing     Problem: PAIN - ADULT  Goal: Verbalizes/displays adequate comfort level or baseline comfort level  Description: Interventions:  - Encourage patient to monitor pain and request assistance  - Assess pain using appropriate pain scale  - Administer analgesics based on type and severity of pain and evaluate response  - Implement non-pharmacological measures as appropriate and evaluate response  - Consider cultural and social influences on pain and pain management  - Notify physician/advanced practitioner if interventions unsuccessful or patient reports new pain  Outcome: Progressing     Problem: INFECTION - ADULT  Goal: Absence or prevention of progression during hospitalization  Description: INTERVENTIONS:  - Assess and monitor for signs and symptoms of infection  - Monitor lab/diagnostic results  - Monitor all insertion sites, i e  indwelling lines, tubes, and drains  - Monitor endotracheal if appropriate and nasal secretions for changes in amount and color  - [place] appropriate cooling/warming therapies per order  - Administer medications as ordered  - Instruct and encourage patient and family to use good hand hygiene technique  - Identify and instruct in appropriate isolation precautions for identified infection/condition  Outcome: Progressing  Goal: Absence of fever/infection during neutropenic period  Description: INTERVENTIONS:  - Monitor WBC    Outcome: Progressing     Problem: SAFETY ADULT  Goal: Patient will remain free of falls  Description: INTERVENTIONS:  - Assess patient frequently for physical needs  -  Identify cognitive and physical deficits and behaviors that affect risk of falls    -  [place] fall precautions as indicated by assessment   - Educate patient/family on patient safety including physical limitations  - Instruct patient to call for assistance with activity based on assessment  - Modify environment to reduce risk of injury  - Consider OT/PT consult to assist with strengthening/mobility  Outcome: Progressing  Goal: Maintain or return to baseline ADL function  Description: INTERVENTIONS:  -  Assess patient's ability to carry out ADLs; assess patient's baseline for ADL function and identify physical deficits which impact ability to perform ADLs (bathing, care of mouth/teeth, toileting, grooming, dressing, etc )  - Assess/evaluate cause of self-care deficits   - Assess range of motion  - Assess patient's mobility; develop plan if impaired  - Assess patient's need for assistive devices and provide as appropriate  - Encourage maximum independence but intervene and supervise when necessary  - Involve family in performance of ADLs  - Assess for home care needs following discharge   - Consider OT consult to assist with ADL evaluation and planning for discharge  - Provide patient education as appropriate  Outcome: Progressing  Goal: Maintain or return mobility status to optimal level  Description: INTERVENTIONS:  - Assess patient's baseline mobility status (ambulation, transfers, stairs, etc )    - Identify cognitive and physical deficits and behaviors that affect mobility  - Identify mobility aids required to assist with transfers and/or ambulation (gait belt, sit-to-stand, lift, walker, cane, etc )  - [place] fall precautions as indicated by assessment  - Record patient progress and toleration of activity level on Mobility SBAR; progress patient to next Phase/Stage  - Instruct patient to call for assistance with activity based on assessment  - Consider rehabilitation consult to assist with strengthening/weightbearing, etc   Outcome: Progressing     Problem: DISCHARGE PLANNING  Goal: Discharge to home or other facility with appropriate resources  Description: INTERVENTIONS:  - Identify barriers to discharge w/patient and caregiver  - Arrange for needed discharge resources and transportation as appropriate  - Identify discharge learning needs (meds, wound care, etc )  - Arrange for interpretive services to assist at discharge as needed  - Refer to Case Management Department for coordinating discharge planning if the patient needs post-hospital services based on physician/advanced practitioner order or complex needs related to functional status, cognitive ability, or social support system  Outcome: Progressing     Problem: Knowledge Deficit  Goal: Patient/family/caregiver demonstrates understanding of disease process, treatment plan, medications, and discharge instructions  Description: Complete learning assessment and assess knowledge base    Interventions:  - Provide teaching at level of understanding  - Provide teaching via preferred learning methods  Outcome: Progressing     Problem: NEUROSENSORY - ADULT  Goal: Achieves stable or improved neurological status  Description: INTERVENTIONS  - Monitor and report changes in neurological status  - Monitor vital signs such as temperature, blood pressure, glucose, and any other labs ordered   - Initiate measures to prevent increased intracranial pressure  - Monitor for seizure activity and implement precautions if appropriate      Outcome: Progressing  Goal: Remains free of injury related to seizures activity  Description: INTERVENTIONS  - Maintain airway, patient safety  and administer oxygen as ordered  - Monitor patient for seizure activity, document and report duration and description of seizure to physician/advanced practitioner  - If seizure occurs,  ensure patient safety during seizure  - Reorient patient post seizure  - Seizure pads on all 4 side rails  - Instruct patient/family to notify RN of any seizure activity including if an aura is experienced  - Instruct patient/family to call for assistance with activity based on nursing assessment  - Administer anti-seizure medications if ordered    Outcome: Progressing  Goal: Achieves maximal functionality and self care  Description: INTERVENTIONS  - Monitor swallowing and airway patency with patient fatigue and changes in neurological status  - Encourage and assist patient to increase activity and self care     - Encourage visually impaired, hearing impaired and aphasic patients to use assistive/communication devices  Outcome: Progressing     Problem: METABOLIC, FLUID AND ELECTROLYTES - ADULT  Goal: Electrolytes maintained within normal limits  Description: INTERVENTIONS:  - Monitor labs and assess patient for signs and symptoms of electrolyte imbalances  - Administer electrolyte replacement as ordered  - Monitor response to electrolyte replacements, including repeat lab results as appropriate  - Instruct patient on fluid and nutrition as appropriate  Outcome: Progressing  Goal: Fluid balance maintained  Description: INTERVENTIONS:  - Monitor labs   - Monitor I/O and WT  - Instruct patient on fluid and nutrition as appropriate  - Assess for signs & symptoms of volume excess or deficit  Outcome: Progressing  Goal: Glucose maintained within target range  Description: INTERVENTIONS:  - Monitor Blood Glucose as ordered  - Assess for signs and symptoms of hyperglycemia and hypoglycemia  - Administer ordered medications to maintain glucose within target range  - Assess nutritional intake and initiate nutrition service referral as needed  Outcome: Progressing     Problem: SKIN/TISSUE INTEGRITY - ADULT  Goal: Skin integrity remains intact  Description: INTERVENTIONS  - Identify patients at risk for skin breakdown  - Assess and monitor skin integrity  - Assess and monitor nutrition and hydration status  - Monitor labs (i e  albumin)  - Assess for incontinence   - Turn and reposition patient  - Assist with mobility/ambulation  - Relieve pressure over bony prominences  - Avoid friction and shearing  - Provide appropriate hygiene as needed including keeping skin clean and dry  - Evaluate need for skin moisturizer/barrier cream  - Collaborate with interdisciplinary team (i e  Nutrition, Rehabilitation, etc )   - Patient/family teaching  Outcome: Progressing  Goal: Incision(s), wounds(s) or drain site(s) healing without S/S of infection  Description: INTERVENTIONS  - Assess and document risk factors for skin impairment   - Assess and document dressing, incision, wound bed, drain sites and surrounding tissue  - Consider nutrition services referral as needed  - Oral mucous membranes remain intact  - Provide patient/ family education  Outcome: Progressing  Goal: Oral mucous membranes remain intact  Description: INTERVENTIONS  - Assess oral mucosa and hygiene practices  - Implement preventative oral hygiene regimen  - Implement oral medicated treatments as ordered  - Initiate Nutrition services referral as needed  Outcome: Progressing     Problem: MUSCULOSKELETAL - ADULT  Goal: Maintain or return mobility to safest level of function  Description: INTERVENTIONS:  - Assess patient's ability to carry out ADLs; assess patient's baseline for ADL function and identify physical deficits which impact ability to perform ADLs (bathing, care of mouth/teeth, toileting, grooming, dressing, etc )  - Assess/evaluate cause of self-care deficits   - Assess range of motion  - Assess patient's mobility  - Assess patient's need for assistive devices and provide as appropriate  - Encourage maximum independence but intervene and supervise when necessary  - Involve family in performance of ADLs  - Assess for home care needs following discharge   - Consider OT consult to assist with ADL evaluation and planning for discharge  - Provide patient education as appropriate  Outcome: Progressing  Goal: Maintain proper alignment of affected body part  Description: INTERVENTIONS:  - Support, maintain and protect limb and body alignment  - Provide patient/ family with appropriate education  Outcome: Progressing

## 2021-06-06 NOTE — PLAN OF CARE
Problem: OCCUPATIONAL THERAPY ADULT  Goal: Performs self-care activities at highest level of function for planned discharge setting  See evaluation for individualized goals  Description: Treatment Interventions: ADL retraining, Functional transfer training, UE strengthening/ROM, Cognitive reorientation, Endurance training, Patient/family training, Equipment evaluation/education, Compensatory technique education, Energy conservation, Activityengagement          See flowsheet documentation for full assessment, interventions and recommendations  Outcome: Progressing  Note: Limitation: Decreased ADL status, Decreased cognition, Decreased UE strength, Decreased Safe judgement during ADL, Decreased endurance, Decreased self-care trans, Decreased high-level ADLs  Prognosis: Good  Assessment: Pt was seen for skilled OT with focus on completion of self care routine, bed mobility, review of RW safety, basic orientation, review of fall prevention, call bell orientation and review of current plan of care  Pt with improved focus to conversation this tx session  Pt able to follow 1-2 step commands with appropriate response without need for redirection  Increased verbal cues required to encourage appropriate sequencing and thoroughness with ADL routine  See above levels of A required for all functional tasks  Pt will benefit from habitual training with direct communication with in central visual field to encourage good carry over  The patient's raw score on the AM-PAC Daily Activity inpatient short form is 15, standardized score is 34 69, less than 39 4  Patients at this level are likely to benefit from discharge to post-acute rehabilitation services  Please refer to the recommendation of the Occupational Therapist for safe discharge planning    Recommendation: Geriatric Consult  OT Discharge Recommendation: Post acute rehabilitation services(with potential increased case/supportive environment  )  OT - OK to Discharge: Yes(when medically cleared  )

## 2021-06-06 NOTE — PLAN OF CARE
Problem: Potential for Falls  Goal: Patient will remain free of falls  Description: INTERVENTIONS:  - Assess patient frequently for physical needs  -  Identify cognitive and physical deficits and behaviors that affect risk of falls  -  [place] fall precautions as indicated by assessment   - Educate patient/family on patient safety including physical limitations  - Instruct patient to call for assistance with activity based on assessment  - Modify environment to reduce risk of injury  - Consider OT/PT consult to assist with strengthening/mobility  Outcome: Progressing     Problem: Nutrition/Hydration-ADULT  Goal: Nutrient/Hydration intake appropriate for improving, restoring or maintaining nutritional needs  Description: Monitor and assess patient's nutrition/hydration status for malnutrition  Collaborate with interdisciplinary team and initiate plan and interventions as ordered  Monitor patient's weight and dietary intake as ordered or per policy  Utilize nutrition screening tool and intervene as necessary  Determine patient's food preferences and provide high-protein, high-caloric foods as appropriate       INTERVENTIONS:  - Monitor oral intake, urinary output, labs, and treatment plans  - Assess nutrition and hydration status and recommend course of action  - Evaluate amount of meals eaten  - Assist patient with eating if necessary   - Allow adequate time for meals  - Recommend/ encourage appropriate diets, oral nutritional supplements, and vitamin/mineral supplements  - Order, calculate, and assess calorie counts as needed  - Recommend, monitor, and adjust tube feedings and TPN/PPN based on assessed needs  - Assess need for intravenous fluids  - Provide specific nutrition/hydration education as appropriate  - Include patient/family/caregiver in decisions related to nutrition  Outcome: Progressing     Problem: Prexisting or High Potential for Compromised Skin Integrity  Goal: Skin integrity is maintained or improved  Description: INTERVENTIONS:  - Identify patients at risk for skin breakdown  - Assess and monitor skin integrity  - Assess and monitor nutrition and hydration status  - Monitor labs   - Assess for incontinence   - Turn and reposition patient  - Assist with mobility/ambulation  - Relieve pressure over bony prominences  - Avoid friction and shearing  - Provide appropriate hygiene as needed including keeping skin clean and dry  - Evaluate need for skin moisturizer/barrier cream  - Collaborate with interdisciplinary team   - Patient/family teaching  - Consider wound care consult   Outcome: Progressing     Problem: PAIN - ADULT  Goal: Verbalizes/displays adequate comfort level or baseline comfort level  Description: Interventions:  - Encourage patient to monitor pain and request assistance  - Assess pain using appropriate pain scale  - Administer analgesics based on type and severity of pain and evaluate response  - Implement non-pharmacological measures as appropriate and evaluate response  - Consider cultural and social influences on pain and pain management  - Notify physician/advanced practitioner if interventions unsuccessful or patient reports new pain  Outcome: Progressing     Problem: INFECTION - ADULT  Goal: Absence or prevention of progression during hospitalization  Description: INTERVENTIONS:  - Assess and monitor for signs and symptoms of infection  - Monitor lab/diagnostic results  - Monitor all insertion sites, i e  indwelling lines, tubes, and drains  - Monitor endotracheal if appropriate and nasal secretions for changes in amount and color  - [place] appropriate cooling/warming therapies per order  - Administer medications as ordered  - Instruct and encourage patient and family to use good hand hygiene technique  - Identify and instruct in appropriate isolation precautions for identified infection/condition  Outcome: Progressing  Goal: Absence of fever/infection during neutropenic period  Description: INTERVENTIONS:  - Monitor WBC    Outcome: Progressing     Problem: SAFETY ADULT  Goal: Patient will remain free of falls  Description: INTERVENTIONS:  - Assess patient frequently for physical needs  -  Identify cognitive and physical deficits and behaviors that affect risk of falls    -  [place] fall precautions as indicated by assessment   - Educate patient/family on patient safety including physical limitations  - Instruct patient to call for assistance with activity based on assessment  - Modify environment to reduce risk of injury  - Consider OT/PT consult to assist with strengthening/mobility  Outcome: Progressing  Goal: Maintain or return to baseline ADL function  Description: INTERVENTIONS:  -  Assess patient's ability to carry out ADLs; assess patient's baseline for ADL function and identify physical deficits which impact ability to perform ADLs (bathing, care of mouth/teeth, toileting, grooming, dressing, etc )  - Assess/evaluate cause of self-care deficits   - Assess range of motion  - Assess patient's mobility; develop plan if impaired  - Assess patient's need for assistive devices and provide as appropriate  - Encourage maximum independence but intervene and supervise when necessary  - Involve family in performance of ADLs  - Assess for home care needs following discharge   - Consider OT consult to assist with ADL evaluation and planning for discharge  - Provide patient education as appropriate  Outcome: Progressing  Goal: Maintain or return mobility status to optimal level  Description: INTERVENTIONS:  - Assess patient's baseline mobility status (ambulation, transfers, stairs, etc )    - Identify cognitive and physical deficits and behaviors that affect mobility  - Identify mobility aids required to assist with transfers and/or ambulation (gait belt, sit-to-stand, lift, walker, cane, etc )  - [place] fall precautions as indicated by assessment  - Record patient progress and toleration of activity level on Mobility SBAR; progress patient to next Phase/Stage  - Instruct patient to call for assistance with activity based on assessment  - Consider rehabilitation consult to assist with strengthening/weightbearing, etc   Outcome: Progressing     Problem: DISCHARGE PLANNING  Goal: Discharge to home or other facility with appropriate resources  Description: INTERVENTIONS:  - Identify barriers to discharge w/patient and caregiver  - Arrange for needed discharge resources and transportation as appropriate  - Identify discharge learning needs (meds, wound care, etc )  - Arrange for interpretive services to assist at discharge as needed  - Refer to Case Management Department for coordinating discharge planning if the patient needs post-hospital services based on physician/advanced practitioner order or complex needs related to functional status, cognitive ability, or social support system  Outcome: Progressing     Problem: Knowledge Deficit  Goal: Patient/family/caregiver demonstrates understanding of disease process, treatment plan, medications, and discharge instructions  Description: Complete learning assessment and assess knowledge base    Interventions:  - Provide teaching at level of understanding  - Provide teaching via preferred learning methods  Outcome: Progressing     Problem: NEUROSENSORY - ADULT  Goal: Achieves stable or improved neurological status  Description: INTERVENTIONS  - Monitor and report changes in neurological status  - Monitor vital signs such as temperature, blood pressure, glucose, and any other labs ordered   - Initiate measures to prevent increased intracranial pressure  - Monitor for seizure activity and implement precautions if appropriate      Outcome: Progressing  Goal: Remains free of injury related to seizures activity  Description: INTERVENTIONS  - Maintain airway, patient safety  and administer oxygen as ordered  - Monitor patient for seizure activity, document and report duration and description of seizure to physician/advanced practitioner  - If seizure occurs,  ensure patient safety during seizure  - Reorient patient post seizure  - Seizure pads on all 4 side rails  - Instruct patient/family to notify RN of any seizure activity including if an aura is experienced  - Instruct patient/family to call for assistance with activity based on nursing assessment  - Administer anti-seizure medications if ordered    Outcome: Progressing  Goal: Achieves maximal functionality and self care  Description: INTERVENTIONS  - Monitor swallowing and airway patency with patient fatigue and changes in neurological status  - Encourage and assist patient to increase activity and self care     - Encourage visually impaired, hearing impaired and aphasic patients to use assistive/communication devices  Outcome: Progressing     Problem: METABOLIC, FLUID AND ELECTROLYTES - ADULT  Goal: Electrolytes maintained within normal limits  Description: INTERVENTIONS:  - Monitor labs and assess patient for signs and symptoms of electrolyte imbalances  - Administer electrolyte replacement as ordered  - Monitor response to electrolyte replacements, including repeat lab results as appropriate  - Instruct patient on fluid and nutrition as appropriate  Outcome: Progressing  Goal: Fluid balance maintained  Description: INTERVENTIONS:  - Monitor labs   - Monitor I/O and WT  - Instruct patient on fluid and nutrition as appropriate  - Assess for signs & symptoms of volume excess or deficit  Outcome: Progressing  Goal: Glucose maintained within target range  Description: INTERVENTIONS:  - Monitor Blood Glucose as ordered  - Assess for signs and symptoms of hyperglycemia and hypoglycemia  - Administer ordered medications to maintain glucose within target range  - Assess nutritional intake and initiate nutrition service referral as needed  Outcome: Progressing     Problem: SKIN/TISSUE INTEGRITY - ADULT  Goal: Skin integrity remains intact  Description: INTERVENTIONS  - Identify patients at risk for skin breakdown  - Assess and monitor skin integrity  - Assess and monitor nutrition and hydration status  - Monitor labs (i e  albumin)  - Assess for incontinence   - Turn and reposition patient  - Assist with mobility/ambulation  - Relieve pressure over bony prominences  - Avoid friction and shearing  - Provide appropriate hygiene as needed including keeping skin clean and dry  - Evaluate need for skin moisturizer/barrier cream  - Collaborate with interdisciplinary team (i e  Nutrition, Rehabilitation, etc )   - Patient/family teaching  Outcome: Progressing  Goal: Incision(s), wounds(s) or drain site(s) healing without S/S of infection  Description: INTERVENTIONS  - Assess and document risk factors for skin impairment   - Assess and document dressing, incision, wound bed, drain sites and surrounding tissue  - Consider nutrition services referral as needed  - Oral mucous membranes remain intact  - Provide patient/ family education  Outcome: Progressing  Goal: Oral mucous membranes remain intact  Description: INTERVENTIONS  - Assess oral mucosa and hygiene practices  - Implement preventative oral hygiene regimen  - Implement oral medicated treatments as ordered  - Initiate Nutrition services referral as needed  Outcome: Progressing     Problem: MUSCULOSKELETAL - ADULT  Goal: Maintain or return mobility to safest level of function  Description: INTERVENTIONS:  - Assess patient's ability to carry out ADLs; assess patient's baseline for ADL function and identify physical deficits which impact ability to perform ADLs (bathing, care of mouth/teeth, toileting, grooming, dressing, etc )  - Assess/evaluate cause of self-care deficits   - Assess range of motion  - Assess patient's mobility  - Assess patient's need for assistive devices and provide as appropriate  - Encourage maximum independence but intervene and supervise when necessary  - Involve family in performance of ADLs  - Assess for home care needs following discharge   - Consider OT consult to assist with ADL evaluation and planning for discharge  - Provide patient education as appropriate  Outcome: Progressing  Goal: Maintain proper alignment of affected body part  Description: INTERVENTIONS:  - Support, maintain and protect limb and body alignment  - Provide patient/ family with appropriate education  Outcome: Progressing

## 2021-06-06 NOTE — OCCUPATIONAL THERAPY NOTE
Occupational Therapy Treatment Note:         06/06/21 0932   OT Last Visit   OT Visit Date 06/06/21   Note Type   Note Type Treatment   Restrictions/Precautions   Weight Bearing Precautions Per Order No   Other Precautions Fall Risk;Cognitive; Chair Alarm; Bed Alarm   Pain Assessment   Pain Assessment Tool 0-10   Pain Score No Pain   Pain Location/Orientation Location: Hip;Location: Generalized   ADL   Where Assessed Edge of bed   Grooming Assistance 5  Supervision/Setup   Grooming Deficit Setup;Verbal cueing;Supervision/safety; Increased time to complete;Wash/dry hands; Wash/dry face;Brushing hair   Grooming Comments cues to initiate and terminate tasks required  UB Bathing Assistance 4  Minimal Assistance   UB Bathing Deficit Setup;Verbal cueing;Supervision/safety; Increased time to complete  (increased A for back)   UB Bathing Comments cues to initiate and terminate tasks required  LB Bathing Assistance 3  Moderate Assistance   LB Bathing Deficit Setup;Steadying;Verbal cueing;Supervision/safety; Increased time to complete; Buttocks;Right lower leg including foot; Left lower leg including foot   LB Bathing Comments Increased A for isiah anal hygiene instance  cues to initiate and terminate tasks required  to complete front isiah care while seated EOB  UB Dressing Assistance 4  Minimal Assistance   UB Dressing Deficit Setup;Verbal cueing;Supervision/safety; Increased time to complete; Thread RUE; Thread LUE   UB Dressing Comments cues to initiate and terminate tasks required  LB Dressing Assistance 3  Moderate Assistance   LB Dressing Deficit Setup;Steadying; Requires assistive device for steadying;Verbal cueing;Supervision/safety; Increased time to complete; Don/doff R sock; Don/doff L sock; Thread RLE into underwear; Thread LLE into underwear;Pull up over hips   LB Dressing Comments increased A will be required for clothing management instance      Toileting Assistance  3  Moderate Assistance   Toileting Deficit Setup;Verbal cueing;Supervison/safety; Increased time to complete;Use of bedpan/urinal setup; Clothing management down;Clothing management up;Perineal hygiene   Toileting Comments Pt with limited perception/motor planning with use of hand held urinal  Pt positioned urinal upside down with attempts to use causing spillage  Hand over hand A required  Pt is aware of need to void  Functional Standing Tolerance   Time 2-3 mins   Activity self care routine/dynamic stand  activities   Comments Pt with improved stand tolerance with appropriate useage of RW  Bed Mobility   Supine to Sit 4  Minimal assistance   Additional items Assist x 1;Bedrails; Increased time required;Verbal cues;LE management;HOB elevated   Additional Comments Pt OOB in bedside chair with chair alarm activated  Transfers   Sit to Stand 4  Minimal assistance   Additional items Assist x 1; Increased time required;Verbal cues;Armrests; Bedrails   Stand to Sit 4  Minimal assistance   Additional items Assist x 1;Bedrails;Armrests; Increased time required;Verbal cues   Stand pivot 4  Minimal assistance   Additional items Assist x 1;Bedrails;Armrests; Increased time required;Verbal cues   Toilet transfer 4  Minimal assistance   Additional items Assist x 1;Bedrails;Armrests; Increased time required;Verbal cues; Commode   Additional Comments hand over hand A required to carry over reach for armrests with rise and descent  Functional Mobility   Functional Mobility 4  Minimal assistance   Additional Comments x1   Additional items Rolling walker   Toilet Transfers   Toilet Transfer From [employer] walker   Toilet Transfer Type To and from   Toilet Transfer to Standard bedside commode   Toilet Transfer Technique Ambulating;Stand pivot   Toilet Transfers Verbal cues; Minimal assistance   Toilet Transfers Comments cues for safe hand placement required  Cognition   Overall Cognitive Status Impaired   Arousal/Participation Responsive; Cooperative   Attention Attends with cues to redirect   Orientation Level Oriented to person;Disoriented to place; Disoriented to time;Disoriented to situation   Memory Decreased short term memory;Decreased recall of precautions;Decreased recall of recent events   Following Commands Follows one step commands without difficulty   Comments cues will be required with communication in central visual field to carry over sequencing with functional tasks  Additional Activities   Additional Activities Other (Comment)  (reviewed basic orientation and need to ring for A w/ trans)   Additional Activities Comments Pt will required close supervision to prevent falls due to STM deficits  Activity Tolerance   Activity Tolerance Patient tolerated treatment well   Medical Staff Made Aware Reported all findings to nursing staff  Pt with chair alarm activated upon termination of tx session  Reported findings to [email or web address]  Recommending OOB positioning no more than 1-2 hrs with close supervision provided  Assessment   Assessment Pt was seen for skilled OT with focus on completion of self care routine, bed mobility, review of RW safety, basic orientation, review of fall prevention, call bell orientation and review of current plan of care  Pt with improved focus to conversation this tx session  Pt able to follow 1-2 step commands with appropriate response without need for redirection  Increased verbal cues required to encourage appropriate sequencing and thoroughness with ADL routine  See above levels of A required for all functional tasks  Pt will benefit from habitual training with direct communication with in central visual field to encourage good carry over  The patient's raw score on the AM-PAC Daily Activity inpatient short form is 15, standardized score is 34 69, less than 39 4  Patients at this level are likely to benefit from discharge to post-acute rehabilitation services   Please refer to the recommendation of the Occupational Therapist for safe discharge planning  Plan   Treatment Interventions ADL retraining;Functional transfer training;UE strengthening/ROM; Cognitive reorientation; Endurance training;Patient/family training   Goal Expiration Date 06/17/21   OT Treatment Day 2   OT Frequency 3-5x/wk   Recommendation   Recommendation Geriatric Consult   OT Discharge Recommendation Post acute rehabilitation services  (with potential increased case/supportive environment  )   OT - OK to Discharge Yes  (when medically cleared   )   AM-PAC Daily Activity Inpatient   Lower Body Dressing 2   Bathing 2   Toileting 2   Upper Body Dressing 3   Grooming 3   Eating 3   Daily Activity Raw Score 15   Daily Activity Standardized Score (Calc for Raw Score >=11) 34 69   AM-PAC Applied Cognition Inpatient   Following a Speech/Presentation 2   Understanding Ordinary Conversation 2   Taking Medications 1   Remembering Where Things Are Placed or Put Away 1   Remembering List of 4-5 Errands 1   Taking Care of Complicated Tasks 1   Applied Cognition Raw Score 8   Applied Cognition Standardized Score 19 32   [place], [address]

## 2021-06-06 NOTE — PROGRESS NOTES
[address] [place] [address]  Progress Note - Cheryle Euler 1935, 80 y o  male MRN: [id number]  Unit/Bed#: E2 -01 Encounter: [id number]  Primary Care Provider: Yazmin Parekh MD   Date and time admitted to hospital: 6/2/2021  1:36 PM      * Dementia without behavioral disturbance   Assessment & Plan  · With associated deconditioning and ambulatory dysfunction -> spouse unable to care for him at home anymore  · C/w Zoloft - supportive care  · PT/OT recommending skilled rehab -> awaiting placement     Insulin-dependent diabetes mellitus  Assessment & Plan  Lab Results   Component Value Date     HGBA1C 9 2 (A) 10/13/2020      · Hold oral hypoglycemics while hospitalized -> should discontinue Acarbose on discharge but would be okay to resume Glimepiride  · On basal/prandial insulin w/ additional SSI coverage per [employer] while hospitalized  · Carbohydrate restricted diet     Chronic kidney disease stage 3  Assessment & Plan  · Baseline creatinine of approximately 1 3-1 4 - has remained within baseline  · Note chronic Cozaar use     Essential hypertension  Assessment & Plan  · C/w Cozaar      Hyperlipidemia  Assessment & Plan  · C/w ASA/statin       DVT Prophylaxis:  Lovenox      Patient Centered Rounds:  I have performed bedside rounds and discussed plan of care with nursing today  Discussions with Specialists or Other Care Team Provider:  see above assessments if applicable    Education and Discussions with Family / Patient:  Family present at bedside    Time Spent for Care:  32 minutes  More than 50% of total time spent on counseling and coordination of care as described above  Current Length of Stay: 4 day(s)    Current Patient Status: Inpatient   Certification Statement:  Patient will continue to require additional hospital stay due to assessments as noted above  Code Status: Level 3 - DNAR and DNI        Subjective:     Seen/examined earlier in the day    Revisited when family present at bedside  Awaiting placement  Objective:     Vitals:   Temp (24hrs), Av 7 °F (36 5 °C), Min:97 3 °F (36 3 °C), Max:97 9 °F (36 6 °C)    Temp:  [97 3 °F (36 3 °C)-97 9 °F (36 6 °C)] 97 8 °F (36 6 °C)  HR:  [70-78] 70  Resp:  [16-18] 16  BP: (117-144)/(65-82) 117/65  SpO2:  [94 %-100 %] 95 %  Body mass index is 27 25 kg/m²  Input and Output Summary (last 24 hours):        Intake/Output Summary (Last 24 hours) at 2021 1640  Last data filed at 2021 0900  Gross per 24 hour   Intake --   Output 448 ml   Net -448 ml       Physical Exam:     GENERAL:  Weak/fatigued  HEAD:  Normocephalic - atraumatic  EYES: PERRL - EOMI   MOUTH:  Mucosa moist  NECK:  Supple - full range of motion  CARDIAC:  Rate controlled   PULMONARY:  Nonlabored respirations at rest  ABDOMEN:  Soft - nontender/nondistended - active bowel sounds  MUSCULOSKELETAL:  Motor strength/range of motion the condition  NEUROLOGIC:  Baseline demented  SKIN:  Chronic wrinkles/blemishes   PSYCHIATRIC:  Mood/affect flat      Additional Data:     Labs & Recent Cultures:    Results from last 7 days   Lab Units 21  0503 21  1413   WBC Thousand/uL 9 76 8 40   HEMOGLOBIN g/dL 15 1 14 9   HEMATOCRIT % 45 7 44 1   PLATELETS Thousands/uL 258 275   NEUTROS PCT %  --  76*   LYMPHS PCT %  --  9*   MONOS PCT %  --  11   EOS PCT %  --  2     Results from last 7 days   Lab Units 21  0503 21  1413   POTASSIUM mmol/L 3 9 4 1   CHLORIDE mmol/L 104 99*   CO2 mmol/L 29 30   BUN mg/dL 29* 25   CREATININE mg/dL 1 25 1 33*   CALCIUM mg/dL 9 2 9 0   ALK PHOS U/L  --  105   ALT U/L  --  25   AST U/L  --  22         Results from last 7 days   Lab Units 21  1557 21  1119 21  0627 21  2110 21  1647 21  0600 21  2105 21  1623 21  1124 21  0605 21  2130 21  1710   POC GLUCOSE mg/dl 168* 391* 121 128 242* 193* 258* 92 210* 128 198* 221*                         Last 24 Hours Medication List:   Current Facility-Administered Medications   Medication Dose Route Frequency Provider Last Rate    aspirin  81 mg Oral Daily Meño Boyd MD      enoxaparin  40 mg Subcutaneous Daily Meño Boyd MD      insulin glargine  20 Units Subcutaneous HS Meño Boyd MD      insulin lispro  1-5 Units Subcutaneous TID [facility] Meño Boyd MD      insulin lispro  6 Units Subcutaneous Daily With Breakfast Meño Boyd MD      insulin lispro  6 Units Subcutaneous Daily With Lunch Meño Boyd MD      insulin lispro  6 Units Subcutaneous Daily With Jenae Coulter MD      losartan  100 mg Oral Daily Meño Boyd MD      melatonin  3 mg Oral HS Meño Boyd MD      Birdyves Peasant ANTIFUNGAL   Topical BID Jordan Alexander MD      nystatin   Topical BID Meño Boyd MD      pravastatin  40 mg Oral Daily With Jenae Coulter MD      sertraline  50 mg Oral Daily Meño Boyd MD                      ** Please Note: This note is constructed using a voice recognition dictation system  An occasional wrong word/phrase or sound-a-like substitution may have been picked up by dictation device due to the inherent limitations of voice recognition software  Read the chart carefully and recognize, using reasonable context, where substitutions may have occurred  **

## 2021-06-07 LAB
GLUCOSE SERPL-MCNC: 141 MG/DL (ref 65–140)
GLUCOSE SERPL-MCNC: 165 MG/DL (ref 65–140)
GLUCOSE SERPL-MCNC: 284 MG/DL (ref 65–140)
GLUCOSE SERPL-MCNC: 78 MG/DL (ref 65–140)

## 2021-06-07 PROCEDURE — 99232 SBSQ HOSP IP/OBS MODERATE 35: CPT | Performed by: FAMILY MEDICINE

## 2021-06-07 PROCEDURE — 82948 REAGENT STRIP/BLOOD GLUCOSE: CPT

## 2021-06-07 RX ADMIN — MICONAZOLE NITRATE: 20 CREAM TOPICAL at 18:24

## 2021-06-07 RX ADMIN — NYSTATIN: 100000 POWDER TOPICAL at 18:24

## 2021-06-07 RX ADMIN — SERTRALINE HYDROCHLORIDE 50 MG: 50 TABLET ORAL at 08:15

## 2021-06-07 RX ADMIN — INSULIN LISPRO 3 UNITS: 100 INJECTION, SOLUTION INTRAVENOUS; SUBCUTANEOUS at 12:15

## 2021-06-07 RX ADMIN — INSULIN GLARGINE 20 UNITS: 100 INJECTION, SOLUTION SUBCUTANEOUS at 22:06

## 2021-06-07 RX ADMIN — INSULIN LISPRO 6 UNITS: 100 INJECTION, SOLUTION INTRAVENOUS; SUBCUTANEOUS at 16:41

## 2021-06-07 RX ADMIN — PRAVASTATIN SODIUM 40 MG: 40 TABLET ORAL at 16:40

## 2021-06-07 RX ADMIN — INSULIN LISPRO 6 UNITS: 100 INJECTION, SOLUTION INTRAVENOUS; SUBCUTANEOUS at 08:15

## 2021-06-07 RX ADMIN — ASPIRIN 81 MG: 81 TABLET, CHEWABLE ORAL at 08:15

## 2021-06-07 RX ADMIN — ENOXAPARIN SODIUM 40 MG: 40 INJECTION SUBCUTANEOUS at 08:15

## 2021-06-07 RX ADMIN — MICONAZOLE NITRATE: 20 CREAM TOPICAL at 08:15

## 2021-06-07 RX ADMIN — MELATONIN 3 MG: at 22:07

## 2021-06-07 RX ADMIN — LOSARTAN POTASSIUM 100 MG: 50 TABLET, FILM COATED ORAL at 08:15

## 2021-06-07 RX ADMIN — INSULIN LISPRO 1 UNITS: 100 INJECTION, SOLUTION INTRAVENOUS; SUBCUTANEOUS at 16:40

## 2021-06-07 RX ADMIN — INSULIN LISPRO 6 UNITS: 100 INJECTION, SOLUTION INTRAVENOUS; SUBCUTANEOUS at 12:15

## 2021-06-07 RX ADMIN — NYSTATIN: 100000 POWDER TOPICAL at 08:15

## 2021-06-07 NOTE — PLAN OF CARE
Problem: Potential for Falls  Goal: Patient will remain free of falls  Description: INTERVENTIONS:  - Assess patient frequently for physical needs  -  Identify cognitive and physical deficits and behaviors that affect risk of falls  -  [place] fall precautions as indicated by assessment   - Educate patient/family on patient safety including physical limitations  - Instruct patient to call for assistance with activity based on assessment  - Modify environment to reduce risk of injury  - Consider OT/PT consult to assist with strengthening/mobility  6/7/2021 1020 by Radha Diane RN  Outcome: Progressing  6/7/2021 1020 by Radha Diane RN  Outcome: Progressing     Problem: Nutrition/Hydration-ADULT  Goal: Nutrient/Hydration intake appropriate for improving, restoring or maintaining nutritional needs  Description: Monitor and assess patient's nutrition/hydration status for malnutrition  Collaborate with interdisciplinary team and initiate plan and interventions as ordered  Monitor patient's weight and dietary intake as ordered or per policy  Utilize nutrition screening tool and intervene as necessary  Determine patient's food preferences and provide high-protein, high-caloric foods as appropriate       INTERVENTIONS:  - Monitor oral intake, urinary output, labs, and treatment plans  - Assess nutrition and hydration status and recommend course of action  - Evaluate amount of meals eaten  - Assist patient with eating if necessary   - Allow adequate time for meals  - Recommend/ encourage appropriate diets, oral nutritional supplements, and vitamin/mineral supplements  - Order, calculate, and assess calorie counts as needed  - Recommend, monitor, and adjust tube feedings and TPN/PPN based on assessed needs  - Assess need for intravenous fluids  - Provide specific nutrition/hydration education as appropriate  - Include patient/family/caregiver in decisions related to nutrition  6/7/2021 1020 by Radha Diane RN  Outcome: Progressing  6/7/2021 1020 by Jennifer Ribera RN  Outcome: Progressing     Problem: Prexisting or High Potential for Compromised Skin Integrity  Goal: Skin integrity is maintained or improved  Description: INTERVENTIONS:  - Identify patients at risk for skin breakdown  - Assess and monitor skin integrity  - Assess and monitor nutrition and hydration status  - Monitor labs   - Assess for incontinence   - Turn and reposition patient  - Assist with mobility/ambulation  - Relieve pressure over bony prominences  - Avoid friction and shearing  - Provide appropriate hygiene as needed including keeping skin clean and dry  - Evaluate need for skin moisturizer/barrier cream  - Collaborate with interdisciplinary team   - Patient/family teaching  - Consider wound care consult   6/7/2021 1020 by Jennifer Ribera RN  Outcome: Progressing  6/7/2021 1020 by Jennifer Ribera RN  Outcome: Progressing     Problem: PAIN - ADULT  Goal: Verbalizes/displays adequate comfort level or baseline comfort level  Description: Interventions:  - Encourage patient to monitor pain and request assistance  - Assess pain using appropriate pain scale  - Administer analgesics based on type and severity of pain and evaluate response  - Implement non-pharmacological measures as appropriate and evaluate response  - Consider cultural and social influences on pain and pain management  - Notify physician/advanced practitioner if interventions unsuccessful or patient reports new pain  6/7/2021 1020 by Jennifer Ribera RN  Outcome: Progressing  6/7/2021 1020 by Jennifer Ribera RN  Outcome: Progressing     Problem: INFECTION - ADULT  Goal: Absence or prevention of progression during hospitalization  Description: INTERVENTIONS:  - Assess and monitor for signs and symptoms of infection  - Monitor lab/diagnostic results  - Monitor all insertion sites, i e  indwelling lines, tubes, and drains  - Monitor endotracheal if appropriate and nasal secretions for changes in amount and color  - Economy appropriate cooling/warming therapies per order  - Administer medications as ordered  - Instruct and encourage patient and family to use good hand hygiene technique  - Identify and instruct in appropriate isolation precautions for identified infection/condition  6/7/2021 1020 by Elizabeth Benz RN  Outcome: Progressing  6/7/2021 1020 by Elizabeth Benz RN  Outcome: Progressing  Goal: Absence of fever/infection during neutropenic period  Description: INTERVENTIONS:  - Monitor WBC    6/7/2021 1020 by Elizabeth Benz RN  Outcome: Progressing  6/7/2021 1020 by Elizabeth Benz RN  Outcome: Progressing     Problem: SAFETY ADULT  Goal: Patient will remain free of falls  Description: INTERVENTIONS:  - Assess patient frequently for physical needs  -  Identify cognitive and physical deficits and behaviors that affect risk of falls    -  [place] fall precautions as indicated by assessment   - Educate patient/family on patient safety including physical limitations  - Instruct patient to call for assistance with activity based on assessment  - Modify environment to reduce risk of injury  - Consider OT/PT consult to assist with strengthening/mobility  6/7/2021 1020 by Elizabeth Benz RN  Outcome: Progressing  6/7/2021 1020 by Elizabeth Benz RN  Outcome: Progressing  Goal: Maintain or return to baseline ADL function  Description: INTERVENTIONS:  -  Assess patient's ability to carry out ADLs; assess patient's baseline for ADL function and identify physical deficits which impact ability to perform ADLs (bathing, care of mouth/teeth, toileting, grooming, dressing, etc )  - Assess/evaluate cause of self-care deficits   - Assess range of motion  - Assess patient's mobility; develop plan if impaired  - Assess patient's need for assistive devices and provide as appropriate  - Encourage maximum independence but intervene and supervise when necessary  - Involve family in performance of ADLs  - Assess for home care needs following discharge   - Consider OT consult to assist with ADL evaluation and planning for discharge  - Provide patient education as appropriate  6/7/2021 1020 by Zhao Morton RN  Outcome: Progressing  6/7/2021 1020 by Zhao Morton RN  Outcome: Progressing  Goal: Maintain or return mobility status to optimal level  Description: INTERVENTIONS:  - Assess patient's baseline mobility status (ambulation, transfers, stairs, etc )    - Identify cognitive and physical deficits and behaviors that affect mobility  - Identify mobility aids required to assist with transfers and/or ambulation (gait belt, sit-to-stand, lift, walker, cane, etc )  - [place] fall precautions as indicated by assessment  - Record patient progress and toleration of activity level on Mobility SBAR; progress patient to next Phase/Stage  - Instruct patient to call for assistance with activity based on assessment  - Consider rehabilitation consult to assist with strengthening/weightbearing, etc   6/7/2021 1020 by Zhao Morton RN  Outcome: Progressing  6/7/2021 1020 by Zhao Morton RN  Outcome: Progressing     Problem: DISCHARGE PLANNING  Goal: Discharge to home or other facility with appropriate resources  Description: INTERVENTIONS:  - Identify barriers to discharge w/patient and caregiver  - Arrange for needed discharge resources and transportation as appropriate  - Identify discharge learning needs (meds, wound care, etc )  - Arrange for interpretive services to assist at discharge as needed  - Refer to Case Management Department for coordinating discharge planning if the patient needs post-hospital services based on physician/advanced practitioner order or complex needs related to functional status, cognitive ability, or social support system  6/7/2021 1020 by Zhao Morton RN  Outcome: Progressing  6/7/2021 1020 by Zhao Morton RN  Outcome: Progressing     Problem: Knowledge Deficit  Goal: Patient/family/caregiver demonstrates understanding of disease process, treatment plan, medications, and discharge instructions  Description: Complete learning assessment and assess knowledge base  Interventions:  - Provide teaching at level of understanding  - Provide teaching via preferred learning methods  6/7/2021 1020 by Drew Bond RN  Outcome: Progressing  6/7/2021 1020 by Drew Bond RN  Outcome: Progressing     Problem: NEUROSENSORY - ADULT  Goal: Achieves stable or improved neurological status  Description: INTERVENTIONS  - Monitor and report changes in neurological status  - Monitor vital signs such as temperature, blood pressure, glucose, and any other labs ordered   - Initiate measures to prevent increased intracranial pressure  - Monitor for seizure activity and implement precautions if appropriate      6/7/2021 1020 by Drew Bond RN  Outcome: Progressing  6/7/2021 1020 by Drew Bond RN  Outcome: Progressing  Goal: Remains free of injury related to seizures activity  Description: INTERVENTIONS  - Maintain airway, patient safety  and administer oxygen as ordered  - Monitor patient for seizure activity, document and report duration and description of seizure to physician/advanced practitioner  - If seizure occurs,  ensure patient safety during seizure  - Reorient patient post seizure  - Seizure pads on all 4 side rails  - Instruct patient/family to notify RN of any seizure activity including if an aura is experienced  - Instruct patient/family to call for assistance with activity based on nursing assessment  - Administer anti-seizure medications if ordered    6/7/2021 1020 by Drew Bond RN  Outcome: Progressing  6/7/2021 1020 by Drew Bond RN  Outcome: Progressing  Goal: Achieves maximal functionality and self care  Description: INTERVENTIONS  - Monitor swallowing and airway patency with patient fatigue and changes in neurological status  - Encourage and assist patient to increase activity and self care     - Encourage visually impaired, hearing impaired and aphasic patients to use assistive/communication devices  6/7/2021 1020 by Joseph Raphael RN  Outcome: Progressing  6/7/2021 1020 by Joseph Raphael RN  Outcome: Progressing     Problem: METABOLIC, FLUID AND ELECTROLYTES - ADULT  Goal: Electrolytes maintained within normal limits  Description: INTERVENTIONS:  - Monitor labs and assess patient for signs and symptoms of electrolyte imbalances  - Administer electrolyte replacement as ordered  - Monitor response to electrolyte replacements, including repeat lab results as appropriate  - Instruct patient on fluid and nutrition as appropriate  6/7/2021 1020 by Joseph Raphael RN  Outcome: Progressing  6/7/2021 1020 by Joseph Raphael RN  Outcome: Progressing  Goal: Fluid balance maintained  Description: INTERVENTIONS:  - Monitor labs   - Monitor I/O and WT  - Instruct patient on fluid and nutrition as appropriate  - Assess for signs & symptoms of volume excess or deficit  6/7/2021 1020 by Joseph Raphael RN  Outcome: Progressing  6/7/2021 1020 by Joseph Raphael RN  Outcome: Progressing  Goal: Glucose maintained within target range  Description: INTERVENTIONS:  - Monitor Blood Glucose as ordered  - Assess for signs and symptoms of hyperglycemia and hypoglycemia  - Administer ordered medications to maintain glucose within target range  - Assess nutritional intake and initiate nutrition service referral as needed  6/7/2021 1020 by Joseph Raphael RN  Outcome: Progressing  6/7/2021 1020 by Joseph Raphael RN  Outcome: Progressing     Problem: SKIN/TISSUE INTEGRITY - ADULT  Goal: Skin integrity remains intact  Description: INTERVENTIONS  - Identify patients at risk for skin breakdown  - Assess and monitor skin integrity  - Assess and monitor nutrition and hydration status  - Monitor labs (i e  albumin)  - Assess for incontinence   - Turn and reposition patient  - Assist with mobility/ambulation  - Relieve pressure over bony prominences  - Avoid friction and shearing  - Provide appropriate hygiene as needed including keeping skin clean and dry  - Evaluate need for skin moisturizer/barrier cream  - Collaborate with interdisciplinary team (i e  Nutrition, Rehabilitation, etc )   - Patient/family teaching  Outcome: Progressing  Goal: Incision(s), wounds(s) or drain site(s) healing without S/S of infection  Description: INTERVENTIONS  - Assess and document risk factors for skin impairment   - Assess and document dressing, incision, wound bed, drain sites and surrounding tissue  - Consider nutrition services referral as needed  - Oral mucous membranes remain intact  - Provide patient/ family education  Outcome: Progressing  Goal: Oral mucous membranes remain intact  Description: INTERVENTIONS  - Assess oral mucosa and hygiene practices  - Implement preventative oral hygiene regimen  - Implement oral medicated treatments as ordered  - Initiate Nutrition services referral as needed  Outcome: Progressing     Problem: MUSCULOSKELETAL - ADULT  Goal: Maintain or return mobility to safest level of function  Description: INTERVENTIONS:  - Assess patient's ability to carry out ADLs; assess patient's baseline for ADL function and identify physical deficits which impact ability to perform ADLs (bathing, care of mouth/teeth, toileting, grooming, dressing, etc )  - Assess/evaluate cause of self-care deficits   - Assess range of motion  - Assess patient's mobility  - Assess patient's need for assistive devices and provide as appropriate  - Encourage maximum independence but intervene and supervise when necessary  - Involve family in performance of ADLs  - Assess for home care needs following discharge   - Consider OT consult to assist with ADL evaluation and planning for discharge  - Provide patient education as appropriate  Outcome: Progressing  Goal: Maintain proper alignment of affected body part  Description: INTERVENTIONS:  - Support, maintain and protect limb and body alignment  - Provide patient/ family with appropriate education  Outcome: Progressing

## 2021-06-07 NOTE — CASE MANAGEMENT
CM follow up  CM communication with spouse Syd Kwon to review her progress with the LT application for [facility]  Spouse reports she is waiting to speak with her   CM reported Pts medical clearance, and the expectation that Pt could discharge today  Spouse verbalised understanding  Spouse has planned to bring the completed LT sepideh to hospital today, to provide to [facility]  CM reported same to [facility]  LTC application faxed to [facility] admissions coordinator [phone]  Pt will discharge to [facility] pending insurance authorisation  CM will continue to follow Pt to assess for ongoing needs in anticipation of discharge

## 2021-06-07 NOTE — PLAN OF CARE
Problem: PHYSICAL THERAPY ADULT  Goal: Performs mobility at highest level of function for planned discharge setting  See evaluation for individualized goals  Description: Treatment/Interventions: Functional transfer training, LE strengthening/ROM, Elevations, Therapeutic exercise, Cognitive reorientation, Patient/family training, Equipment eval/education, Bed mobility, Gait training, Continued evaluation, Spoke to nursing, Family, OT, Spoke to case management          See flowsheet documentation for full assessment, interventions and recommendations  Outcome: Progressing  Note: Prognosis: Fair  Problem List: Decreased strength, Decreased endurance, Impaired balance, Decreased mobility, Decreased cognition, Decreased safety awareness, Impaired judgement  Assessment: Pt  Seen for PT interventions as per PT pOC  Pt rec'd seated out of bed in chair, offers no complaints  Pt showing improved ability to perform transfers and ambulation this session  Pt requiring less assistance Min assist x1 for transfers and ambulation on levels with use of Rw  Pt requires constant cues for hand placement, directional cues for navigation in environment and cues and assistance for object avoidence  Pt  requires assistance to navigate Rw around objects  Pt able to ambulate farther distances this session progressed to [de-identified]' x2  Distances limited as pt reports feeling " woozy" Pt performs seated b/l le arom exercises without difficulty verbal and tactile cues required for correct performance  Pt remains at increased risk for falls due to impairments in balance, strength, safety, cognition, mobility, and activity tolerance  The patient's AM-PAC Basic Mobility Inpatient Short Form Raw Score is 16, Standardized Score is 38 32  A standardized score less than 42 9 suggests the patient may benefit from discharge to post-acute rehabilitation services   Please also refer to the recommendation of the Physical Therapist for safe discharge planning  STR is recommended at d/c in order to maximize functional outcomes, mobility and independence  Barriers to Discharge: Decreased caregiver support  Barriers to Discharge Comments: caregiver burden     PT Discharge Recommendation: Post acute rehabilitation services     PT - OK to Discharge: Yes    See flowsheet documentation for full assessment

## 2021-06-07 NOTE — ASSESSMENT & PLAN NOTE
Lab Results   Component Value Date    HGBA1C 9 2 (A) 10/13/2020     Results from last 7 days   Lab Units 06/07/21  1118 06/07/21  0630 06/06/21  2127 06/06/21  2041 06/06/21  1557 06/06/21  1119 06/06/21  0627 06/05/21  2110   POC GLUCOSE mg/dl 284* 141* 104 68 168* 391* 121 128     · Diabetes mellitus with hyperglycemia on admission  Currently on increase glargine but standing lispro 6 units t i d  blood glucose fairly controlled  Will monitor to make further adjustments     · Holding glimepiride and acarbose----> discontinue acarbose upon discharge

## 2021-06-07 NOTE — ASSESSMENT & PLAN NOTE
· Dementia with ambulatory dysfunction  Spouse unable to care for him anymore  · PT/OT recommending rehab  Family agreeable    · Awaiting insurance authorization

## 2021-06-07 NOTE — PHYSICAL THERAPY NOTE
Physical Therapy Treatment Note     06/06/21 0959   Note Type   Note Type Treatment   Pain Assessment   Pain Assessment Tool Pain Assessment not indicated - pt denies pain   Pain Score No Pain   Restrictions/Precautions   Other Precautions Cognitive; Chair Alarm; Bed Alarm; Fall Risk   General   Chart Reviewed Yes   Family/Caregiver Present No   Cognition   Overall Cognitive Status Impaired   Arousal/Participation Responsive; Cooperative   Attention Attends with cues to redirect   Orientation Level Oriented to person;Oriented to place; Disoriented to situation  (oriented to day and month, not year )   Memory Decreased short term memory;Decreased recall of recent events;Decreased recall of precautions   Following Commands Follows one step commands with increased time or repetition   Subjective   Subjective " I am waiting for my wife to come"     Bed Mobility   Additional Comments pt oob upon arrival      Transfers   Sit to Stand 4  Minimal assistance   Additional items Assist x 1; Armrests; Increased time required;Verbal cues   Stand to Sit 4  Minimal assistance   Additional items Assist x 1; Armrests; Increased time required;Verbal cues   Ambulation/Elevation   Gait pattern Forward Flexion; Inconsistent francis; Short stride  (poor obstacle avoidence with rw management)   Gait Assistance 4  Minimal assist   Additional items Assist x 1;Verbal cues   Assistive Device Rolling walker   Distance 80'x2   Balance   Static Sitting Good   Dynamic Sitting Good   Static Standing Fair -   Dynamic Standing Poor +   Ambulatory Poor +   Activity Tolerance   Activity Tolerance Patient limited by fatigue   Nurse Made Aware Roxann Cirri   Exercises   Glute Sets Sitting;15 reps;AROM; Bilateral   Hip Abduction Sitting;15 reps;AROM; Bilateral   Hip Adduction Sitting;10 reps;Bilateral  (isometric hip add x 10 reps   )   Knee AROM Long Arc Quad Sitting;15 reps;AROM; Bilateral   Ankle Pumps Supine;10 reps;AROM; Bilateral   UE Exercise   (seated w/c push-ups x 3 reps, STS x 3 with min assist x1)   Marching Sitting;15 reps;AROM; Bilateral   Assessment   Prognosis Fair   Problem List Decreased strength;Decreased endurance; Impaired balance;Decreased mobility; Decreased cognition;Decreased safety awareness; Impaired judgement   Assessment Pt  Seen for PT interventions as per PT pOC  Pt rec'd seated out of bed in chair, offers no complaints  Pt showing improved ability to perform transfers and ambulation this session  Pt requiring less assistance Min assist x1 for transfers and ambulation on levels with use of Rw  Pt requires constant cues for hand placement, directional cues for navigation in environment and cues and assistance for object avoidence  Pt  requires assistance to navigate Rw around objects  Pt able to ambulate farther distances this session progressed to [de-identified]' x2  Distances limited as pt reports feeling " woozy" Pt performs seated b/l le arom exercises without difficulty verbal and tactile cues required for correct performance  Pt remains at increased risk for falls due to impairments in balance, strength, safety, cognition, mobility, and activity tolerance  The patient's AM-PAC Basic Mobility Inpatient Short Form Raw Score is 16, Standardized Score is 38 32  A standardized score less than 42 9 suggests the patient may benefit from discharge to post-acute rehabilitation services  Please also refer to the recommendation of the Physical Therapist for safe discharge planning  STR is recommended at d/c in order to maximize functional outcomes, mobility and independence  Goals   Patient Goals " to talk to my wife "     STG Expiration Date 06/17/21   PT Treatment Day 3   Plan   Treatment/Interventions Functional transfer training;LE strengthening/ROM; Therapeutic exercise; Endurance training;Cognitive reorientation;Patient/family training;Equipment eval/education;Gait training;Spoke to nursing   Progress Slow progress, cognitive deficits   PT Frequency (4-5x/week)   Recommendation   PT Discharge Recommendation Post acute rehabilitation services   PT - OK to Discharge Yes   AM-PAC Basic Mobility Inpatient   Turning in Bed Without Bedrails 3   Lying on Back to Sitting on Edge of Flat Bed 2   Moving Bed to Chair 3   Standing Up From Chair 3   Walk in Room 3   Climb 3-5 Stairs 2   Basic Mobility Inpatient Raw Score 16   Basic Mobility Standardized Score 38 32         Aj Wick, PTA

## 2021-06-07 NOTE — PROGRESS NOTES
[facility]  Progress Note - Lorel Model 1935, 80 y o  male MRN: [id number]  Unit/Bed#: E2 -01 Encounter: [id number]  Primary Care Provider: Vanessa Baker MD   Date and time admitted to hospital: 6/2/2021  1:36 PM    Type 2 diabetes mellitus with stage 3 chronic kidney disease, with long-term current use of insulin [facility])  Assessment & Plan  Lab Results   Component Value Date    HGBA1C 9 2 (A) 10/13/2020     Results from last 7 days   Lab Units 06/07/21  1118 06/07/21  0630 06/06/21  2127 06/06/21  2041 06/06/21  1557 06/06/21  1119 06/06/21  0627 06/05/21  2110   POC GLUCOSE mg/dl 284* 141* 104 68 168* 391* 121 128     · Diabetes mellitus with hyperglycemia on admission  Currently on increase glargine but standing lispro 6 units t i d  blood glucose fairly controlled  Will monitor to make further adjustments  · Holding glimepiride and acarbose----> discontinue acarbose upon discharge    Anxiety disorder  Assessment & Plan  · Mood stable continue sertraline    Chronic kidney disease, stage 3 [facility])  Assessment & Plan  Lab Results   Component Value Date    EGFR 52 06/03/2021    EGFR 48 06/02/2021    EGFR 41 04/09/2021    CREATININE 1 25 06/03/2021    CREATININE 1 33 (H) 06/02/2021    CREATININE 1 53 (H) 04/09/2021     Stable at baseline  Essential hypertension  Assessment & Plan  · bp is acceptable  will continue losartan    Mixed hyperlipidemia  Assessment & Plan  · Continue pravastatin    * Dementia without behavioral disturbance (HCC)  Assessment & Plan  · Dementia with ambulatory dysfunction  Spouse unable to care for him anymore  · PT/OT recommending rehab  Family agreeable  · Awaiting insurance authorization        VTE Pharmacologic Prophylaxis:   Pharmacologic: Enoxaparin (Lovenox)  Mechanical VTE Prophylaxis in Place: Yes    Patient Centered Rounds: I have performed bedside rounds with nursing staff today      Discussions with Specialists or Other Care Team Provider:  Case management    Education and Discussions with Family / Patient:  Patient's wife    Time Spent for Care: 30 minutes  More than 50% of total time spent on counseling and coordination of care as described above  Current Length of Stay: 5 day(s)    Current Patient Status: Inpatient   Certification Statement: The patient will continue to require additional inpatient hospital stay due to Awaiting insurance authorization    Discharge Plan:  Short-term rehab    Code Status: Level 3 - DNAR and DNI      Subjective:   Patient seen and examined  He is oriented x2  No complaints  No overnight events  Objective:     Vitals:   Temp (24hrs), Av °F (36 7 °C), Min:97 3 °F (36 3 °C), Max:98 7 °F (37 1 °C)    Temp:  [97 3 °F (36 3 °C)-98 7 °F (37 1 °C)] 97 3 °F (36 3 °C)  HR:  [73-77] 77  Resp:  [16-18] 18  BP: (114-176)/(57-77) 143/63  SpO2:  [96 %-98 %] 98 %  Body mass index is 27 25 kg/m²  Input and Output Summary (last 24 hours): Intake/Output Summary (Last 24 hours) at 2021 1549  Last data filed at 2021 0230  Gross per 24 hour   Intake --   Output 585 ml   Net -585 ml       Physical Exam:     Physical Exam  Constitutional:       General: He is not in acute distress  HENT:      Head: Normocephalic and atraumatic  Nose: No congestion  Mouth/Throat:      Pharynx: Oropharynx is clear  Eyes:      Conjunctiva/sclera: Conjunctivae normal    Cardiovascular:      Rate and Rhythm: Normal rate and regular rhythm  Heart sounds: No murmur  Pulmonary:      Effort: No respiratory distress  Breath sounds: No wheezing  Abdominal:      General: There is no distension  Tenderness: There is no abdominal tenderness  There is no guarding  Musculoskeletal:      Right lower leg: No edema  Left lower leg: No edema  Skin:     General: Skin is warm and dry  Neurological:      Mental Status: Mental status is at baseline     Psychiatric:         Mood and Affect: Mood normal          Additional Data:     Labs:    Results from last 7 days   Lab Units 06/03/21  0503 06/02/21  1413   WBC Thousand/uL 9 76 8 40   HEMOGLOBIN g/dL 15 1 14 9   HEMATOCRIT % 45 7 44 1   PLATELETS Thousands/uL 258 275   NEUTROS PCT %  --  76*   LYMPHS PCT %  --  9*   MONOS PCT %  --  11   EOS PCT %  --  2     Results from last 7 days   Lab Units 06/03/21  0503 06/02/21  1413   SODIUM mmol/L 141 139   POTASSIUM mmol/L 3 9 4 1   CHLORIDE mmol/L 104 99*   CO2 mmol/L 29 30   BUN mg/dL 29* 25   CREATININE mg/dL 1 25 1 33*   ANION GAP mmol/L 8 10   CALCIUM mg/dL 9 2 9 0   ALBUMIN g/dL  --  2 5*   TOTAL BILIRUBIN mg/dL  --  0 47   ALK PHOS U/L  --  105   ALT U/L  --  25   AST U/L  --  22   GLUCOSE RANDOM mg/dL 71 335*         Results from last 7 days   Lab Units 06/07/21  1118 06/07/21  0630 06/06/21  2127 06/06/21  2041 06/06/21  1557 06/06/21  1119 06/06/21  0627 06/05/21  2110 06/05/21  1647 06/05/21  0600 06/04/21  2105 06/04/21  1623   POC GLUCOSE mg/dl 284* 141* 104 68 168* 391* 121 128 242* 193* 258* 92                   * I Have Reviewed All Lab Data Listed Above  * Additional Pertinent Lab Tests Reviewed:  [facility]    Imaging:    Imaging Reports Reviewed Today Include: no new       Recent Cultures (last 7 days):           Last 24 Hours Medication List:   Current Facility-Administered Medications   Medication Dose Route Frequency Provider Last Rate    aspirin  81 mg Oral Daily Peter Finley MD      enoxaparin  40 mg Subcutaneous Daily Peter Finley MD      insulin glargine  20 Units Subcutaneous HS Peter Finley MD      insulin lispro  1-5 Units Subcutaneous TID [facility] Peter Finley MD      insulin lispro  6 Units Subcutaneous Daily With Breakfast Peter Finley MD      insulin lispro  6 Units Subcutaneous Daily With Lunch Peter Finley MD      insulin lispro  6 Units Subcutaneous Daily With Mohit Dupont MD      losartan  100 mg Oral Daily MD Daniel Tomas melatonin  3 mg Oral HS MD Mere Warren ANTIFUNGAL   Topical BID Usama Mcrae MD      nystatin   Topical BID Zara Castillo MD      pravastatin  40 mg Oral Daily With Maryann López MD      sertraline  50 mg Oral Daily Zara Castillo MD          Today, Patient Was Seen By: Kiara Yoder MD    ** Please Note: Dictation voice to text software may have been used in the creation of this document   **

## 2021-06-07 NOTE — PLAN OF CARE
Problem: Potential for Falls  Goal: Patient will remain free of falls  Description: INTERVENTIONS:  - Assess patient frequently for physical needs  -  Identify cognitive and physical deficits and behaviors that affect risk of falls  -  [place] fall precautions as indicated by assessment   - Educate patient/family on patient safety including physical limitations  - Instruct patient to call for assistance with activity based on assessment  - Modify environment to reduce risk of injury  - Consider OT/PT consult to assist with strengthening/mobility  Outcome: Progressing     Problem: Nutrition/Hydration-ADULT  Goal: Nutrient/Hydration intake appropriate for improving, restoring or maintaining nutritional needs  Description: Monitor and assess patient's nutrition/hydration status for malnutrition  Collaborate with interdisciplinary team and initiate plan and interventions as ordered  Monitor patient's weight and dietary intake as ordered or per policy  Utilize nutrition screening tool and intervene as necessary  Determine patient's food preferences and provide high-protein, high-caloric foods as appropriate       INTERVENTIONS:  - Monitor oral intake, urinary output, labs, and treatment plans  - Assess nutrition and hydration status and recommend course of action  - Evaluate amount of meals eaten  - Assist patient with eating if necessary   - Allow adequate time for meals  - Recommend/ encourage appropriate diets, oral nutritional supplements, and vitamin/mineral supplements  - Order, calculate, and assess calorie counts as needed  - Recommend, monitor, and adjust tube feedings and TPN/PPN based on assessed needs  - Assess need for intravenous fluids  - Provide specific nutrition/hydration education as appropriate  - Include patient/family/caregiver in decisions related to nutrition  Outcome: Progressing     Problem: Prexisting or High Potential for Compromised Skin Integrity  Goal: Skin integrity is maintained or improved  Description: INTERVENTIONS:  - Identify patients at risk for skin breakdown  - Assess and monitor skin integrity  - Assess and monitor nutrition and hydration status  - Monitor labs   - Assess for incontinence   - Turn and reposition patient  - Assist with mobility/ambulation  - Relieve pressure over bony prominences  - Avoid friction and shearing  - Provide appropriate hygiene as needed including keeping skin clean and dry  - Evaluate need for skin moisturizer/barrier cream  - Collaborate with interdisciplinary team   - Patient/family teaching  - Consider wound care consult   Outcome: Progressing     Problem: PAIN - ADULT  Goal: Verbalizes/displays adequate comfort level or baseline comfort level  Description: Interventions:  - Encourage patient to monitor pain and request assistance  - Assess pain using appropriate pain scale  - Administer analgesics based on type and severity of pain and evaluate response  - Implement non-pharmacological measures as appropriate and evaluate response  - Consider cultural and social influences on pain and pain management  - Notify physician/advanced practitioner if interventions unsuccessful or patient reports new pain  Outcome: Progressing     Problem: INFECTION - ADULT  Goal: Absence or prevention of progression during hospitalization  Description: INTERVENTIONS:  - Assess and monitor for signs and symptoms of infection  - Monitor lab/diagnostic results  - Monitor all insertion sites, i e  indwelling lines, tubes, and drains  - Monitor endotracheal if appropriate and nasal secretions for changes in amount and color  - [place] appropriate cooling/warming therapies per order  - Administer medications as ordered  - Instruct and encourage patient and family to use good hand hygiene technique  - Identify and instruct in appropriate isolation precautions for identified infection/condition  Outcome: Progressing  Goal: Absence of fever/infection during neutropenic period  Description: INTERVENTIONS:  - Monitor WBC    Outcome: Progressing     Problem: SAFETY ADULT  Goal: Patient will remain free of falls  Description: INTERVENTIONS:  - Assess patient frequently for physical needs  -  Identify cognitive and physical deficits and behaviors that affect risk of falls    -  [place] fall precautions as indicated by assessment   - Educate patient/family on patient safety including physical limitations  - Instruct patient to call for assistance with activity based on assessment  - Modify environment to reduce risk of injury  - Consider OT/PT consult to assist with strengthening/mobility  Outcome: Progressing  Goal: Maintain or return to baseline ADL function  Description: INTERVENTIONS:  -  Assess patient's ability to carry out ADLs; assess patient's baseline for ADL function and identify physical deficits which impact ability to perform ADLs (bathing, care of mouth/teeth, toileting, grooming, dressing, etc )  - Assess/evaluate cause of self-care deficits   - Assess range of motion  - Assess patient's mobility; develop plan if impaired  - Assess patient's need for assistive devices and provide as appropriate  - Encourage maximum independence but intervene and supervise when necessary  - Involve family in performance of ADLs  - Assess for home care needs following discharge   - Consider OT consult to assist with ADL evaluation and planning for discharge  - Provide patient education as appropriate  Outcome: Progressing  Goal: Maintain or return mobility status to optimal level  Description: INTERVENTIONS:  - Assess patient's baseline mobility status (ambulation, transfers, stairs, etc )    - Identify cognitive and physical deficits and behaviors that affect mobility  - Identify mobility aids required to assist with transfers and/or ambulation (gait belt, sit-to-stand, lift, walker, cane, etc )  - [place] fall precautions as indicated by assessment  - Record patient progress and toleration of activity level on Mobility SBAR; progress patient to next Phase/Stage  - Instruct patient to call for assistance with activity based on assessment  - Consider rehabilitation consult to assist with strengthening/weightbearing, etc   Outcome: Progressing     Problem: Knowledge Deficit  Goal: Patient/family/caregiver demonstrates understanding of disease process, treatment plan, medications, and discharge instructions  Description: Complete learning assessment and assess knowledge base    Interventions:  - Provide teaching at level of understanding  - Provide teaching via preferred learning methods  Outcome: Progressing     Problem: NEUROSENSORY - ADULT  Goal: Achieves stable or improved neurological status  Description: INTERVENTIONS  - Monitor and report changes in neurological status  - Monitor vital signs such as temperature, blood pressure, glucose, and any other labs ordered   - Initiate measures to prevent increased intracranial pressure  - Monitor for seizure activity and implement precautions if appropriate      Outcome: Progressing  Goal: Remains free of injury related to seizures activity  Description: INTERVENTIONS  - Maintain airway, patient safety  and administer oxygen as ordered  - Monitor patient for seizure activity, document and report duration and description of seizure to physician/advanced practitioner  - If seizure occurs,  ensure patient safety during seizure  - Reorient patient post seizure  - Seizure pads on all 4 side rails  - Instruct patient/family to notify RN of any seizure activity including if an aura is experienced  - Instruct patient/family to call for assistance with activity based on nursing assessment  - Administer anti-seizure medications if ordered    Outcome: Progressing  Goal: Achieves maximal functionality and self care  Description: INTERVENTIONS  - Monitor swallowing and airway patency with patient fatigue and changes in neurological status  - Encourage and assist patient to increase activity and self care     - Encourage visually impaired, hearing impaired and aphasic patients to use assistive/communication devices  Outcome: Progressing     Problem: DISCHARGE PLANNING  Goal: Discharge to home or other facility with appropriate resources  Description: INTERVENTIONS:  - Identify barriers to discharge w/patient and caregiver  - Arrange for needed discharge resources and transportation as appropriate  - Identify discharge learning needs (meds, wound care, etc )  - Arrange for interpretive services to assist at discharge as needed  - Refer to Case Management Department for coordinating discharge planning if the patient needs post-hospital services based on physician/advanced practitioner order or complex needs related to functional status, cognitive ability, or social support system  Outcome: Progressing     Problem: METABOLIC, FLUID AND ELECTROLYTES - ADULT  Goal: Electrolytes maintained within normal limits  Description: INTERVENTIONS:  - Monitor labs and assess patient for signs and symptoms of electrolyte imbalances  - Administer electrolyte replacement as ordered  - Monitor response to electrolyte replacements, including repeat lab results as appropriate  - Instruct patient on fluid and nutrition as appropriate  Outcome: Progressing  Goal: Fluid balance maintained  Description: INTERVENTIONS:  - Monitor labs   - Monitor I/O and WT  - Instruct patient on fluid and nutrition as appropriate  - Assess for signs & symptoms of volume excess or deficit  Outcome: Progressing  Goal: Glucose maintained within target range  Description: INTERVENTIONS:  - Monitor Blood Glucose as ordered  - Assess for signs and symptoms of hyperglycemia and hypoglycemia  - Administer ordered medications to maintain glucose within target range  - Assess nutritional intake and initiate nutrition service referral as needed  Outcome: Progressing     Problem: SKIN/TISSUE INTEGRITY - ADULT  Goal: Skin integrity remains intact  Description: INTERVENTIONS  - Identify patients at risk for skin breakdown  - Assess and monitor skin integrity  - Assess and monitor nutrition and hydration status  - Monitor labs (i e  albumin)  - Assess for incontinence   - Turn and reposition patient  - Assist with mobility/ambulation  - Relieve pressure over bony prominences  - Avoid friction and shearing  - Provide appropriate hygiene as needed including keeping skin clean and dry  - Evaluate need for skin moisturizer/barrier cream  - Collaborate with interdisciplinary team (i e  Nutrition, Rehabilitation, etc )   - Patient/family teaching  Outcome: Progressing  Goal: Incision(s), wounds(s) or drain site(s) healing without S/S of infection  Description: INTERVENTIONS  - Assess and document risk factors for skin impairment   - Assess and document dressing, incision, wound bed, drain sites and surrounding tissue  - Consider nutrition services referral as needed  - Oral mucous membranes remain intact  - Provide patient/ family education  Outcome: Progressing  Goal: Oral mucous membranes remain intact  Description: INTERVENTIONS  - Assess oral mucosa and hygiene practices  - Implement preventative oral hygiene regimen  - Implement oral medicated treatments as ordered  - Initiate Nutrition services referral as needed  Outcome: Progressing

## 2021-06-08 LAB
GLUCOSE SERPL-MCNC: 150 MG/DL (ref 65–140)
GLUCOSE SERPL-MCNC: 184 MG/DL (ref 65–140)
GLUCOSE SERPL-MCNC: 232 MG/DL (ref 65–140)
GLUCOSE SERPL-MCNC: 263 MG/DL (ref 65–140)

## 2021-06-08 PROCEDURE — 82948 REAGENT STRIP/BLOOD GLUCOSE: CPT

## 2021-06-08 PROCEDURE — 99232 SBSQ HOSP IP/OBS MODERATE 35: CPT | Performed by: FAMILY MEDICINE

## 2021-06-08 PROCEDURE — 97535 SELF CARE MNGMENT TRAINING: CPT

## 2021-06-08 PROCEDURE — 97530 THERAPEUTIC ACTIVITIES: CPT

## 2021-06-08 RX ADMIN — INSULIN GLARGINE 20 UNITS: 100 INJECTION, SOLUTION SUBCUTANEOUS at 22:26

## 2021-06-08 RX ADMIN — SERTRALINE HYDROCHLORIDE 50 MG: 50 TABLET ORAL at 08:01

## 2021-06-08 RX ADMIN — MICONAZOLE NITRATE: 20 CREAM TOPICAL at 17:30

## 2021-06-08 RX ADMIN — INSULIN LISPRO 6 UNITS: 100 INJECTION, SOLUTION INTRAVENOUS; SUBCUTANEOUS at 16:33

## 2021-06-08 RX ADMIN — MELATONIN 3 MG: at 22:25

## 2021-06-08 RX ADMIN — LOSARTAN POTASSIUM 100 MG: 50 TABLET, FILM COATED ORAL at 08:00

## 2021-06-08 RX ADMIN — INSULIN LISPRO 2 UNITS: 100 INJECTION, SOLUTION INTRAVENOUS; SUBCUTANEOUS at 16:32

## 2021-06-08 RX ADMIN — INSULIN LISPRO 6 UNITS: 100 INJECTION, SOLUTION INTRAVENOUS; SUBCUTANEOUS at 07:59

## 2021-06-08 RX ADMIN — INSULIN LISPRO 6 UNITS: 100 INJECTION, SOLUTION INTRAVENOUS; SUBCUTANEOUS at 11:50

## 2021-06-08 RX ADMIN — ASPIRIN 81 MG: 81 TABLET, CHEWABLE ORAL at 08:01

## 2021-06-08 RX ADMIN — NYSTATIN: 100000 POWDER TOPICAL at 08:01

## 2021-06-08 RX ADMIN — ENOXAPARIN SODIUM 40 MG: 40 INJECTION SUBCUTANEOUS at 08:01

## 2021-06-08 RX ADMIN — INSULIN LISPRO 1 UNITS: 100 INJECTION, SOLUTION INTRAVENOUS; SUBCUTANEOUS at 07:59

## 2021-06-08 RX ADMIN — NYSTATIN: 100000 POWDER TOPICAL at 17:30

## 2021-06-08 RX ADMIN — INSULIN LISPRO 2 UNITS: 100 INJECTION, SOLUTION INTRAVENOUS; SUBCUTANEOUS at 11:49

## 2021-06-08 RX ADMIN — MICONAZOLE NITRATE: 20 CREAM TOPICAL at 08:01

## 2021-06-08 RX ADMIN — PRAVASTATIN SODIUM 40 MG: 40 TABLET ORAL at 16:29

## 2021-06-08 NOTE — ASSESSMENT & PLAN NOTE
Lab Results   Component Value Date    HGBA1C 9 2 (A) 10/13/2020     Results from last 7 days   Lab Units 06/08/21  1054 06/08/21  0605 06/07/21 2036 06/07/21  1555 06/07/21  1118 06/07/21  0630 06/06/21 2127 06/06/21 2041   POC GLUCOSE mg/dl 263* 184* 78 165* 284* 141* 104 68     · Diabetes mellitus with hyperglycemia on admission  Currently on glargine 20 u HS standing lispro 6 units t i d  blood glucose fairly controlled  Will monitor to make further adjustments     · Holding glimepiride and acarbose----> discontinue acarbose upon discharge

## 2021-06-08 NOTE — OCCUPATIONAL THERAPY NOTE
Occupational Therapy Treatment Note:         06/08/21 0914   OT Last Visit   OT Visit Date 06/08/21   Note Type   Note Type Treatment   Restrictions/Precautions   Weight Bearing Precautions Per Order No   Other Precautions Cognitive; Chair Alarm; Bed Alarm; Fall Risk   Pain Assessment   Pain Assessment Tool 0-10   Pain Score No Pain   Pain Location/Orientation Location: Hip;Location: Generalized   ADL   Where Assessed Edge of bed   Grooming Assistance 5  Supervision/Setup   Grooming Deficit Setup;Verbal cueing;Supervision/safety; Increased time to complete;Wash/dry hands; Wash/dry face;Brushing hair   UB Bathing Assistance 4  Minimal Assistance   UB Bathing Deficit Steadying;Setup;Supervision/safety; Increased time to complete;Verbal cueing   UB Bathing Comments cues for sequencing activities required due to limtied motor planning with cognitive deficits  LB Bathing Assistance 3  Moderate Assistance   LB Bathing Deficit Setup;Steadying;Verbal cueing;Supervision/safety; Increased time to complete;Perineal area; Buttocks;Right lower leg including foot; Left lower leg including foot   LB Bathing Comments increased A for isiah care and per anal hygiene due to limtied focus to tasks and need for hands on A due to limited stand tolerance/balance  UB Dressing Assistance 4  Minimal Assistance   UB Dressing Deficit Setup;Verbal cueing;Supervision/safety; Increased time to complete; Thread RUE; Thread LUE   UB Dressing Comments cognitive deficits affecting Pt's perception/motor planning  LB Dressing Assistance 2  Maximal Assistance   LB Dressing Deficit Setup;Steadying; Requires assistive device for steadying;Verbal cueing;Supervision/safety; Increased time to complete; Don/doff R sock; Don/doff L sock   LB Dressing Comments Limited functional reach to BLE this tx session  Functional Standing Tolerance   Time 2-3 mins   Activity functional tasks, self care routine      Comments Pt able to focus with both hand over hand and one step commands provided to sequence activities and carry over safety with functional tasks instance  Bed Mobility   Supine to Sit 4  Minimal assistance   Additional items Assist x 1; Increased time required;Verbal cues;LE management; Bedrails   Sit to Supine Unable to assess   Additional Comments Pt remained in bedside chair with chair alarm activated upon termination of tx session  Transfers   Sit to Stand 4  Minimal assistance   Additional items Assist x 1; Armrests; Bedrails; Increased time required;Verbal cues   Stand to Sit 4  Minimal assistance   Additional items Assist x 1;Bedrails;Armrests; Increased time required;Verbal cues   Stand pivot 4  Minimal assistance   Additional items Assist x 1;Bedrails;Armrests; Increased time required;Verbal cues   Additional Comments hand over hand A required with verbal cues for safe use of RW and safety with postiional changes  Functional Mobility   Functional Mobility 4  Minimal assistance   Additional Comments x1   Additional items Rolling walker   Toilet Transfers   Toilet Transfer From [employer] walker   Toilet Transfer Type To and from   Toilet Transfer to Standard bedside commode   Toilet Transfer Technique Stand pivot; Ambulating   Toilet Transfers Verbal cues; Minimal assistance   Toilet Transfers Comments hands on A with verbal cues will be required with use of bedside commode or hand held urinal     Cognition   Overall Cognitive Status Impaired   Arousal/Participation Responsive; Alert; Cooperative   Attention Attends with cues to redirect   Orientation Level Oriented to person;Disoriented to place; Disoriented to time;Disoriented to situation   Memory Decreased short term memory;Decreased recall of recent events;Decreased recall of precautions   Following Commands Follows one step commands without difficulty   Comments cues for safety required with all functional tasks  Pt with need for redirection for appropirate sequencing/motor planning      Additional Activities Additional Activities Other (Comment)  (reviewed basic orientation  )   Additional Activities Comments Pt unaware of current location  Today's paper provided to Pt with focus on current month, date, year  Pt stated, "thank you"  Activity Tolerance   Activity Tolerance Other (Comment)  (cognitive deficits presenting need for both tactile/verb cue)   Medical Staff Made Aware reported all findings to nursing staff  Pt OOB in bedsdie chair upon termination of tx session  Chair alarm activated with tray table positioned with in close proximity  All needs with in reach  Pt educated on call bell orientation and need for increased A with functional transfers  Assessment   Assessment Pt was seen for skilled OT with focus on completion of bed mobility, self care tasks, functional transfers, review of basic orientation and review of current plan of care  See above levels of A required for all functional tasks  Pt continues to remain pleasantly confused during tx session warranting need for both verbal and tactile cues with all functional tasks to encourage appropriate sequencing and safety  See above levels of A required for all functional tasks  Pt OOB in bedside chair with chair alarm activated  All findings to nursing staff  The patient's raw score on the AM-PAC Daily Activity inpatient short form is 15, standardized score is 34 69, less than 39 4  Patients at this level are likely to benefit from discharge to post-acute rehabilitation services  Please refer to the recommendation of the Occupational Therapist for safe discharge planning  Plan   Treatment Interventions ADL retraining;Functional transfer training;UE strengthening/ROM; Endurance training;Patient/family training;Cognitive reorientation   Goal Expiration Date 06/17/21   OT Treatment Day 3   OT Frequency 3-5x/wk   Recommendation   Recommendation Geriatric Consult   OT Discharge Recommendation Post acute rehabilitation services  (with potential for increased case/supportive environment  )   OT - OK to Discharge Yes   AM-PAC Daily Activity Inpatient   Lower Body Dressing 2   Bathing 2   Toileting 2   Upper Body Dressing 3   Grooming 3   Eating 3   Daily Activity Raw Score 15   Daily Activity Standardized Score (Calc for Raw Score >=11) 34 69   AM-PAC Applied Cognition Inpatient   Following a Speech/Presentation 2   Understanding Ordinary Conversation 2   Taking Medications 1   Remembering Where Things Are Placed or Put Away 1   Remembering List of 4-5 Errands 1   Taking Care of Complicated Tasks 1   Applied Cognition Raw Score 8   Applied Cognition Standardized Score 19 32   Timothy Wheeler, [address]

## 2021-06-08 NOTE — CASE MANAGEMENT
CM follow  Up  Communication through [facility] with Stephy Elliott, admission coordinator at [facility] follow up with receipt of Pts application faxed on [phone]  Stephy Elliott confirmed receipt of application  Waiting for insurance auth  CM informed spouse Lauren Cassette of same  CM will continue to follow Pt to assess for additional needs in anticipation of discharge

## 2021-06-08 NOTE — PLAN OF CARE
Problem: OCCUPATIONAL THERAPY ADULT  Goal: Performs self-care activities at highest level of function for planned discharge setting  See evaluation for individualized goals  Description: Treatment Interventions: ADL retraining, Functional transfer training, UE strengthening/ROM, Cognitive reorientation, Endurance training, Patient/family training, Equipment evaluation/education, Compensatory technique education, Energy conservation, Activityengagement          See flowsheet documentation for full assessment, interventions and recommendations  Outcome: Progressing  Note: Limitation: Decreased ADL status, Decreased cognition, Decreased UE strength, Decreased Safe judgement during ADL, Decreased endurance, Decreased self-care trans, Decreased high-level ADLs  Prognosis: Good  Assessment: Pt was seen for skilled OT with focus on completion of bed mobility, self care tasks, functional transfers, review of basic orientation and review of current plan of care  See above levels of A required for all functional tasks  Pt continues to remain pleasantly confused during tx session warranting need for both verbal and tactile cues with all functional tasks to encourage appropriate sequencing and safety  See above levels of A required for all functional tasks  Pt OOB in bedside chair with chair alarm activated  All findings to nursing staff  The patient's raw score on the AM-PAC Daily Activity inpatient short form is 15, standardized score is 34 69, less than 39 4  Patients at this level are likely to benefit from discharge to post-acute rehabilitation services  Please refer to the recommendation of the Occupational Therapist for safe discharge planning  Recommendation: Geriatric Consult  OT Discharge Recommendation: Post acute rehabilitation services(with potential for increased case/supportive environment  )  OT - OK to Discharge:  Yes

## 2021-06-08 NOTE — PLAN OF CARE
Problem: Potential for Falls  Goal: Patient will remain free of falls  Description: INTERVENTIONS:  - Assess patient frequently for physical needs  -  Identify cognitive and physical deficits and behaviors that affect risk of falls  -  [place] fall precautions as indicated by assessment   - Educate patient/family on patient safety including physical limitations  - Instruct patient to call for assistance with activity based on assessment  - Modify environment to reduce risk of injury  - Consider OT/PT consult to assist with strengthening/mobility  Outcome: Progressing     Problem: Nutrition/Hydration-ADULT  Goal: Nutrient/Hydration intake appropriate for improving, restoring or maintaining nutritional needs  Description: Monitor and assess patient's nutrition/hydration status for malnutrition  Collaborate with interdisciplinary team and initiate plan and interventions as ordered  Monitor patient's weight and dietary intake as ordered or per policy  Utilize nutrition screening tool and intervene as necessary  Determine patient's food preferences and provide high-protein, high-caloric foods as appropriate       INTERVENTIONS:  - Monitor oral intake, urinary output, labs, and treatment plans  - Assess nutrition and hydration status and recommend course of action  - Evaluate amount of meals eaten  - Assist patient with eating if necessary   - Allow adequate time for meals  - Recommend/ encourage appropriate diets, oral nutritional supplements, and vitamin/mineral supplements  - Order, calculate, and assess calorie counts as needed  - Recommend, monitor, and adjust tube feedings and TPN/PPN based on assessed needs  - Assess need for intravenous fluids  - Provide specific nutrition/hydration education as appropriate  - Include patient/family/caregiver in decisions related to nutrition  Outcome: Progressing     Problem: Prexisting or High Potential for Compromised Skin Integrity  Goal: Skin integrity is maintained or improved  Description: INTERVENTIONS:  - Identify patients at risk for skin breakdown  - Assess and monitor skin integrity  - Assess and monitor nutrition and hydration status  - Monitor labs   - Assess for incontinence   - Turn and reposition patient  - Assist with mobility/ambulation  - Relieve pressure over bony prominences  - Avoid friction and shearing  - Provide appropriate hygiene as needed including keeping skin clean and dry  - Evaluate need for skin moisturizer/barrier cream  - Collaborate with interdisciplinary team   - Patient/family teaching  - Consider wound care consult   Outcome: Progressing     Problem: PAIN - ADULT  Goal: Verbalizes/displays adequate comfort level or baseline comfort level  Description: Interventions:  - Encourage patient to monitor pain and request assistance  - Assess pain using appropriate pain scale  - Administer analgesics based on type and severity of pain and evaluate response  - Implement non-pharmacological measures as appropriate and evaluate response  - Consider cultural and social influences on pain and pain management  - Notify physician/advanced practitioner if interventions unsuccessful or patient reports new pain  Outcome: Progressing     Problem: INFECTION - ADULT  Goal: Absence or prevention of progression during hospitalization  Description: INTERVENTIONS:  - Assess and monitor for signs and symptoms of infection  - Monitor lab/diagnostic results  - Monitor all insertion sites, i e  indwelling lines, tubes, and drains  - Monitor endotracheal if appropriate and nasal secretions for changes in amount and color  - [place] appropriate cooling/warming therapies per order  - Administer medications as ordered  - Instruct and encourage patient and family to use good hand hygiene technique  - Identify and instruct in appropriate isolation precautions for identified infection/condition  Outcome: Progressing  Goal: Absence of fever/infection during neutropenic period  Description: INTERVENTIONS:  - Monitor WBC    Outcome: Progressing     Problem: SAFETY ADULT  Goal: Patient will remain free of falls  Description: INTERVENTIONS:  - Assess patient frequently for physical needs  -  Identify cognitive and physical deficits and behaviors that affect risk of falls    -  [place] fall precautions as indicated by assessment   - Educate patient/family on patient safety including physical limitations  - Instruct patient to call for assistance with activity based on assessment  - Modify environment to reduce risk of injury  - Consider OT/PT consult to assist with strengthening/mobility  Outcome: Progressing  Goal: Maintain or return to baseline ADL function  Description: INTERVENTIONS:  -  Assess patient's ability to carry out ADLs; assess patient's baseline for ADL function and identify physical deficits which impact ability to perform ADLs (bathing, care of mouth/teeth, toileting, grooming, dressing, etc )  - Assess/evaluate cause of self-care deficits   - Assess range of motion  - Assess patient's mobility; develop plan if impaired  - Assess patient's need for assistive devices and provide as appropriate  - Encourage maximum independence but intervene and supervise when necessary  - Involve family in performance of ADLs  - Assess for home care needs following discharge   - Consider OT consult to assist with ADL evaluation and planning for discharge  - Provide patient education as appropriate  Outcome: Progressing  Goal: Maintain or return mobility status to optimal level  Description: INTERVENTIONS:  - Assess patient's baseline mobility status (ambulation, transfers, stairs, etc )    - Identify cognitive and physical deficits and behaviors that affect mobility  - Identify mobility aids required to assist with transfers and/or ambulation (gait belt, sit-to-stand, lift, walker, cane, etc )  - [place] fall precautions as indicated by assessment  - Record patient progress and toleration of activity level on Mobility SBAR; progress patient to next Phase/Stage  - Instruct patient to call for assistance with activity based on assessment  - Consider rehabilitation consult to assist with strengthening/weightbearing, etc   Outcome: Progressing     Problem: DISCHARGE PLANNING  Goal: Discharge to home or other facility with appropriate resources  Description: INTERVENTIONS:  - Identify barriers to discharge w/patient and caregiver  - Arrange for needed discharge resources and transportation as appropriate  - Identify discharge learning needs (meds, wound care, etc )  - Arrange for interpretive services to assist at discharge as needed  - Refer to Case Management Department for coordinating discharge planning if the patient needs post-hospital services based on physician/advanced practitioner order or complex needs related to functional status, cognitive ability, or social support system  Outcome: Progressing     Problem: Knowledge Deficit  Goal: Patient/family/caregiver demonstrates understanding of disease process, treatment plan, medications, and discharge instructions  Description: Complete learning assessment and assess knowledge base    Interventions:  - Provide teaching at level of understanding  - Provide teaching via preferred learning methods  Outcome: Progressing     Problem: NEUROSENSORY - ADULT  Goal: Achieves stable or improved neurological status  Description: INTERVENTIONS  - Monitor and report changes in neurological status  - Monitor vital signs such as temperature, blood pressure, glucose, and any other labs ordered   - Initiate measures to prevent increased intracranial pressure  - Monitor for seizure activity and implement precautions if appropriate      Outcome: Progressing  Goal: Remains free of injury related to seizures activity  Description: INTERVENTIONS  - Maintain airway, patient safety  and administer oxygen as ordered  - Monitor patient for seizure activity, document and report duration and description of seizure to physician/advanced practitioner  - If seizure occurs,  ensure patient safety during seizure  - Reorient patient post seizure  - Seizure pads on all 4 side rails  - Instruct patient/family to notify RN of any seizure activity including if an aura is experienced  - Instruct patient/family to call for assistance with activity based on nursing assessment  - Administer anti-seizure medications if ordered    Outcome: Progressing  Goal: Achieves maximal functionality and self care  Description: INTERVENTIONS  - Monitor swallowing and airway patency with patient fatigue and changes in neurological status  - Encourage and assist patient to increase activity and self care     - Encourage visually impaired, hearing impaired and aphasic patients to use assistive/communication devices  Outcome: Progressing     Problem: METABOLIC, FLUID AND ELECTROLYTES - ADULT  Goal: Electrolytes maintained within normal limits  Description: INTERVENTIONS:  - Monitor labs and assess patient for signs and symptoms of electrolyte imbalances  - Administer electrolyte replacement as ordered  - Monitor response to electrolyte replacements, including repeat lab results as appropriate  - Instruct patient on fluid and nutrition as appropriate  Outcome: Progressing  Goal: Fluid balance maintained  Description: INTERVENTIONS:  - Monitor labs   - Monitor I/O and WT  - Instruct patient on fluid and nutrition as appropriate  - Assess for signs & symptoms of volume excess or deficit  Outcome: Progressing  Goal: Glucose maintained within target range  Description: INTERVENTIONS:  - Monitor Blood Glucose as ordered  - Assess for signs and symptoms of hyperglycemia and hypoglycemia  - Administer ordered medications to maintain glucose within target range  - Assess nutritional intake and initiate nutrition service referral as needed  Outcome: Progressing     Problem: SKIN/TISSUE INTEGRITY - ADULT  Goal: Skin integrity remains intact  Description: INTERVENTIONS  - Identify patients at risk for skin breakdown  - Assess and monitor skin integrity  - Assess and monitor nutrition and hydration status  - Monitor labs (i e  albumin)  - Assess for incontinence   - Turn and reposition patient  - Assist with mobility/ambulation  - Relieve pressure over bony prominences  - Avoid friction and shearing  - Provide appropriate hygiene as needed including keeping skin clean and dry  - Evaluate need for skin moisturizer/barrier cream  - Collaborate with interdisciplinary team (i e  Nutrition, Rehabilitation, etc )   - Patient/family teaching  Outcome: Progressing  Goal: Incision(s), wounds(s) or drain site(s) healing without S/S of infection  Description: INTERVENTIONS  - Assess and document risk factors for skin impairment   - Assess and document dressing, incision, wound bed, drain sites and surrounding tissue  - Consider nutrition services referral as needed  - Oral mucous membranes remain intact  - Provide patient/ family education  Outcome: Progressing  Goal: Oral mucous membranes remain intact  Description: INTERVENTIONS  - Assess oral mucosa and hygiene practices  - Implement preventative oral hygiene regimen  - Implement oral medicated treatments as ordered  - Initiate Nutrition services referral as needed  Outcome: Progressing     Problem: MUSCULOSKELETAL - ADULT  Goal: Maintain or return mobility to safest level of function  Description: INTERVENTIONS:  - Assess patient's ability to carry out ADLs; assess patient's baseline for ADL function and identify physical deficits which impact ability to perform ADLs (bathing, care of mouth/teeth, toileting, grooming, dressing, etc )  - Assess/evaluate cause of self-care deficits   - Assess range of motion  - Assess patient's mobility  - Assess patient's need for assistive devices and provide as appropriate  - Encourage maximum independence but intervene and supervise when necessary  - Involve family in performance of ADLs  - Assess for home care needs following discharge   - Consider OT consult to assist with ADL evaluation and planning for discharge  - Provide patient education as appropriate  Outcome: Progressing  Goal: Maintain proper alignment of affected body part  Description: INTERVENTIONS:  - Support, maintain and protect limb and body alignment  - Provide patient/ family with appropriate education  Outcome: Progressing

## 2021-06-08 NOTE — PROGRESS NOTES
Beata Fry  Progress Note - Jefferey Holter 1935, 80 y o  male MRN: [id number]  Unit/Bed#: E2 -01 Encounter: [id number]  Primary Care Provider: Michelle Poe MD   Date and time admitted to hospital: 6/2/2021  1:36 PM    * Dementia without behavioral disturbance (HCC)  Assessment & Plan  · Dementia with ambulatory dysfunction  Spouse unable to care for him anymore  · PT/OT recommending rehab  Family agreeable  · Awaiting insurance authorization    Type 2 diabetes mellitus with stage 3 chronic kidney disease, with long-term current use of insulin [facility])  Assessment & Plan  Lab Results   Component Value Date    HGBA1C 9 2 (A) 10/13/2020     Results from last 7 days   Lab Units 06/08/21  1054 06/08/21  0605 06/07/21  2036 06/07/21  1555 06/07/21  1118 06/07/21  0630 06/06/21  2127 06/06/21  2041   POC GLUCOSE mg/dl 263* 184* 78 165* 284* 141* 104 68     · Diabetes mellitus with hyperglycemia on admission  Currently on glargine 20 u HS standing lispro 6 units t i d  blood glucose fairly controlled  Will monitor to make further adjustments  · Holding glimepiride and acarbose----> discontinue acarbose upon discharge    Chronic kidney disease, stage 3 [facility])  Assessment & Plan  Lab Results   Component Value Date    EGFR 52 06/03/2021    EGFR 48 06/02/2021    EGFR 41 04/09/2021    CREATININE 1 25 06/03/2021    CREATININE 1 33 (H) 06/02/2021    CREATININE 1 53 (H) 04/09/2021     Stable at baseline  Essential hypertension  Assessment & Plan  · BP is acceptable  will continue losartan    Mixed hyperlipidemia  Assessment & Plan  · Continue pravastatin        VTE Pharmacologic Prophylaxis:   Pharmacologic: Enoxaparin (Lovenox)  Mechanical VTE Prophylaxis in Place: Yes    Patient Centered Rounds: I have performed bedside rounds with nursing staff today      Discussions with Specialists or Other Care Team Provider: CM    Education and Discussions with Family / Patient: Patient, ongoing update of family    Time Spent for Care: 30 minutes  More than 50% of total time spent on counseling and coordination of care as described above  Current Length of Stay: 6 day(s)    Current Patient Status: Inpatient   Certification Statement: The patient will continue to require additional inpatient hospital stay due to need for close monitoring    Discharge Plan: TBD    Code Status: Level 3 - DNAR and DNI      Subjective:   Patient seen and examined  He is pleasantly confused  No complaints  No overnight events  Objective:     Vitals:   Temp (24hrs), Av 5 °F (36 4 °C), Min:97 2 °F (36 2 °C), Max:98 °F (36 7 °C)    Temp:  [97 2 °F (36 2 °C)-98 °F (36 7 °C)] 98 °F (36 7 °C)  HR:  [62-76] 68  Resp:  [20] 20  BP: (144-160)/(78-86) 160/78  SpO2:  [97 %-100 %] 100 %  Body mass index is 27 25 kg/m²  Input and Output Summary (last 24 hours):     No intake or output data in the 24 hours ending 21 1603    Physical Exam:     Physical Exam  Constitutional:       General: He is not in acute distress  HENT:      Head: Normocephalic and atraumatic  Nose: No congestion  Mouth/Throat:      Pharynx: Oropharynx is clear  Eyes:      Conjunctiva/sclera: Conjunctivae normal    Cardiovascular:      Rate and Rhythm: Normal rate and regular rhythm  Heart sounds: No murmur  Pulmonary:      Effort: No respiratory distress  Breath sounds: No wheezing or rales  Abdominal:      General: There is no distension  Tenderness: There is no abdominal tenderness  There is no guarding  Musculoskeletal:      Right lower leg: No edema  Left lower leg: No edema  Skin:     General: Skin is warm and dry  Neurological:      Mental Status: Mental status is at baseline  Psychiatric:         Cognition and Memory: Memory is impaired             Additional Data:     Labs:    Results from last 7 days   Lab Units 21  0503 21  1413   WBC Thousand/uL 9 76 8 40   HEMOGLOBIN g/dL 15 1 14 9   HEMATOCRIT % 45 7 44 1   PLATELETS Thousands/uL 258 275   NEUTROS PCT %  --  76*   LYMPHS PCT %  --  9*   MONOS PCT %  --  11   EOS PCT %  --  2     Results from last 7 days   Lab Units 06/03/21  0503 06/02/21  1413   SODIUM mmol/L 141 139   POTASSIUM mmol/L 3 9 4 1   CHLORIDE mmol/L 104 99*   CO2 mmol/L 29 30   BUN mg/dL 29* 25   CREATININE mg/dL 1 25 1 33*   ANION GAP mmol/L 8 10   CALCIUM mg/dL 9 2 9 0   ALBUMIN g/dL  --  2 5*   TOTAL BILIRUBIN mg/dL  --  0 47   ALK PHOS U/L  --  105   ALT U/L  --  25   AST U/L  --  22   GLUCOSE RANDOM mg/dL 71 335*         Results from last 7 days   Lab Units 06/08/21  1054 06/08/21  0605 06/07/21  2036 06/07/21  1555 06/07/21  1118 06/07/21  0630 06/06/21  2127 06/06/21  2041 06/06/21  1557 06/06/21  1119 06/06/21  0627 06/05/21  2110   POC GLUCOSE mg/dl 263* 184* 78 165* 284* 141* 104 68 168* 391* 121 128                   * I Have Reviewed All Lab Data Listed Above  * Additional Pertinent Lab Tests Reviewed:  Nba 66 Admission Reviewed    Imaging:    Imaging Reports Reviewed Today Include: no new       Recent Cultures (last 7 days):           Last 24 Hours Medication List:   Current Facility-Administered Medications   Medication Dose Route Frequency Provider Last Rate    aspirin  81 mg Oral Daily Aurora Morton MD      enoxaparin  40 mg Subcutaneous Daily Aurora Morton MD      insulin glargine  20 Units Subcutaneous HS Aurora Morton MD      insulin lispro  1-5 Units Subcutaneous TID [facility] Aurora Morton MD      insulin lispro  6 Units Subcutaneous Daily With Breakfast Aurora Morton MD      insulin lispro  6 Units Subcutaneous Daily With Lunch Aurora Morton MD      insulin lispro  6 Units Subcutaneous Daily With Sarah Diaz MD      losartan  100 mg Oral Daily Aurora Payment, MD      melatonin  3 mg Oral HS Aurora PaymentMD Mere ANTIFUNGAL   Topical BID Agustina Mccrary MD      nystatin   Topical BID Aurora Payment, MD      pravastatin 40 mg Oral Daily With Lisa MD Cyril      sertraline  50 mg Oral Daily María Elena Argueta MD          Today, Patient Was Seen By: Demetri Cheung MD    ** Please Note: Dictation voice to text software may have been used in the creation of this document   **

## 2021-06-09 VITALS
HEIGHT: 67 IN | SYSTOLIC BLOOD PRESSURE: 119 MMHG | RESPIRATION RATE: 16 BRPM | DIASTOLIC BLOOD PRESSURE: 69 MMHG | TEMPERATURE: 97.3 F | WEIGHT: 174 LBS | BODY MASS INDEX: 27.31 KG/M2 | OXYGEN SATURATION: 98 % | HEART RATE: 73 BPM

## 2021-06-09 PROBLEM — L89.312 PRESSURE INJURY OF RIGHT BUTTOCK, STAGE 2 (HCC): Status: ACTIVE | Noted: 2021-06-09

## 2021-06-09 LAB
GLUCOSE SERPL-MCNC: 155 MG/DL (ref 65–140)
GLUCOSE SERPL-MCNC: 286 MG/DL (ref 65–140)
GLUCOSE SERPL-MCNC: 297 MG/DL (ref 65–140)

## 2021-06-09 PROCEDURE — 99239 HOSP IP/OBS DSCHRG MGMT >30: CPT | Performed by: FAMILY MEDICINE

## 2021-06-09 PROCEDURE — 97530 THERAPEUTIC ACTIVITIES: CPT

## 2021-06-09 PROCEDURE — NC001 PR NO CHARGE: Performed by: FAMILY MEDICINE

## 2021-06-09 PROCEDURE — 97110 THERAPEUTIC EXERCISES: CPT

## 2021-06-09 PROCEDURE — 82948 REAGENT STRIP/BLOOD GLUCOSE: CPT

## 2021-06-09 PROCEDURE — 97535 SELF CARE MNGMENT TRAINING: CPT

## 2021-06-09 RX ORDER — QUETIAPINE FUMARATE 25 MG/1
12.5 TABLET, FILM COATED ORAL
Refills: 0
Start: 2021-06-09

## 2021-06-09 RX ORDER — QUETIAPINE FUMARATE 25 MG/1
12.5 TABLET, FILM COATED ORAL
Status: DISCONTINUED | OUTPATIENT
Start: 2021-06-09 | End: 2021-06-09 | Stop reason: HOSPADM

## 2021-06-09 RX ORDER — NYSTATIN 100000 [USP'U]/G
POWDER TOPICAL 2 TIMES DAILY
Qty: 15 G | Refills: 0
Start: 2021-06-09

## 2021-06-09 RX ADMIN — ENOXAPARIN SODIUM 40 MG: 40 INJECTION SUBCUTANEOUS at 09:55

## 2021-06-09 RX ADMIN — NYSTATIN: 100000 POWDER TOPICAL at 09:55

## 2021-06-09 RX ADMIN — INSULIN LISPRO 3 UNITS: 100 INJECTION, SOLUTION INTRAVENOUS; SUBCUTANEOUS at 16:46

## 2021-06-09 RX ADMIN — LOSARTAN POTASSIUM 100 MG: 50 TABLET, FILM COATED ORAL at 09:55

## 2021-06-09 RX ADMIN — QUETIAPINE FUMARATE 12.5 MG: 25 TABLET ORAL at 01:10

## 2021-06-09 RX ADMIN — INSULIN LISPRO 6 UNITS: 100 INJECTION, SOLUTION INTRAVENOUS; SUBCUTANEOUS at 12:30

## 2021-06-09 RX ADMIN — INSULIN LISPRO 3 UNITS: 100 INJECTION, SOLUTION INTRAVENOUS; SUBCUTANEOUS at 12:30

## 2021-06-09 RX ADMIN — INSULIN LISPRO 6 UNITS: 100 INJECTION, SOLUTION INTRAVENOUS; SUBCUTANEOUS at 07:42

## 2021-06-09 RX ADMIN — INSULIN LISPRO 6 UNITS: 100 INJECTION, SOLUTION INTRAVENOUS; SUBCUTANEOUS at 16:46

## 2021-06-09 RX ADMIN — ASPIRIN 81 MG: 81 TABLET, CHEWABLE ORAL at 09:55

## 2021-06-09 RX ADMIN — SERTRALINE HYDROCHLORIDE 50 MG: 50 TABLET ORAL at 09:55

## 2021-06-09 RX ADMIN — MICONAZOLE NITRATE: 20 CREAM TOPICAL at 09:55

## 2021-06-09 RX ADMIN — INSULIN LISPRO 1 UNITS: 100 INJECTION, SOLUTION INTRAVENOUS; SUBCUTANEOUS at 08:21

## 2021-06-09 NOTE — PROGRESS NOTES
[facility]  Progress Note - Dwight Alexander 1935, 80 y o  male MRN: [id number]  Unit/Bed#: E2 -01 Encounter: [id number]  Primary Care Provider: Milton Elizabeth MD   Date and time admitted to hospital: 6/2/2021  1:36 PM    * Dementia without behavioral disturbance (HCC)  Assessment & Plan  · Dementia with ambulatory dysfunction  Spouse unable to care for him anymore  · PT/OT recommending rehab  Family agreeable  · Insurance denied placement, will proceed with Peer to peer review     Type 2 diabetes mellitus with stage 3 chronic kidney disease, with long-term current use of insulin [facility])  Assessment & Plan  Lab Results   Component Value Date    HGBA1C 9 2 (A) 10/13/2020     Results from last 7 days   Lab Units 06/09/21  1058 06/09/21  0603 06/08/21  2211 06/08/21  1552 06/08/21  1054 06/08/21  0605 06/07/21  2036 06/07/21  1555   POC GLUCOSE mg/dl 297* 155* 150* 232* 263* 184* 78 165*     · Diabetes mellitus with hyperglycemia on admission  Continue Glargine to 25 u HS standing lispro 6 units t i d  blood glucose fairly controlled  Will monitor to make further adjustments  · Holding glimepiride and acarbose----> discontinue acarbose upon discharge    Chronic kidney disease, stage 3 [facility])  Assessment & Plan  Lab Results   Component Value Date    EGFR 52 06/03/2021    EGFR 48 06/02/2021    EGFR 41 04/09/2021    CREATININE 1 25 06/03/2021    CREATININE 1 33 (H) 06/02/2021    CREATININE 1 53 (H) 04/09/2021     Stable at baseline  Essential hypertension  Assessment & Plan  · BP is acceptable   Will continue losartan    Mixed hyperlipidemia  Assessment & Plan  · Continue pravastatin    Pressure ulcer of right buttock, stage 2   POA, treated with wound care consult, wound care and repositioning  Findings:6/4 Wound care- " POA stage 2 pressure injury on right buttock with surrounding fungal rash"      VTE Pharmacologic Prophylaxis:   Pharmacologic: Enoxaparin (Lovenox)  Mechanical VTE Prophylaxis in Place: Yes    Patient Centered Rounds: I have performed bedside rounds with nursing staff today  Discussions with Specialists or Other Care Team Provider: CM    Education and Discussions with Family / Patient: ongoing update of family    Time Spent for Care: 30 minutes  More than 50% of total time spent on counseling and coordination of care as described above  Current Length of Stay: 7 day(s)    Current Patient Status: Inpatient   Certification Statement: The patient will continue to require additional inpatient hospital stay due to close monitoring, placement     Discharge Plan: SNF    Code Status: Level 3 - DNAR and DNI      Subjective:   Patient seen and examined  He is pleasantly confused, at mental baseline  No o/n events  Objective:     Vitals:   Temp (24hrs), Av 7 °F (36 5 °C), Min:97 1 °F (36 2 °C), Max:98 °F (36 7 °C)    Temp:  [97 1 °F (36 2 °C)-98 °F (36 7 °C)] 97 1 °F (36 2 °C)  HR:  [68-75] 75  Resp:  [16-20] 16  BP: (137-160)/(78-88) 137/86  SpO2:  [97 %-100 %] 98 %  Body mass index is 27 25 kg/m²  Input and Output Summary (last 24 hours):     No intake or output data in the 24 hours ending 21 1418    Physical Exam:     Physical Exam  Constitutional:       General: He is not in acute distress  HENT:      Nose: No congestion  Eyes:      Conjunctiva/sclera: Conjunctivae normal    Cardiovascular:      Rate and Rhythm: Normal rate and regular rhythm  Heart sounds: No murmur  Pulmonary:      Effort: No respiratory distress  Breath sounds: No wheezing or rales  Abdominal:      General: There is no distension  Tenderness: There is no abdominal tenderness  There is no guarding  Musculoskeletal:      Right lower leg: No edema  Left lower leg: No edema  Skin:     General: Skin is warm and dry  Neurological:      Mental Status: Mental status is at baseline         Additional Data:     Labs:    Results from last 7 days   Lab Units 06/03/21  0503   WBC Thousand/uL 9 76   HEMOGLOBIN g/dL 15 1   HEMATOCRIT % 45 7   PLATELETS Thousands/uL 258     Results from last 7 days   Lab Units 06/03/21  0503   SODIUM mmol/L 141   POTASSIUM mmol/L 3 9   CHLORIDE mmol/L 104   CO2 mmol/L 29   BUN mg/dL 29*   CREATININE mg/dL 1 25   ANION GAP mmol/L 8   CALCIUM mg/dL 9 2   GLUCOSE RANDOM mg/dL 71         Results from last 7 days   Lab Units 06/09/21  1058 06/09/21  0603 06/08/21  2211 06/08/21  1552 06/08/21  1054 06/08/21  0605 06/07/21  2036 06/07/21  1555 06/07/21  1118 06/07/21  0630 06/06/21  2127 06/06/21  2041   POC GLUCOSE mg/dl 297* 155* 150* 232* 263* 184* 78 165* 284* 141* 104 68               * I Have Reviewed All Lab Data Listed Above  * Additional Pertinent Lab Tests Reviewed:  Nba 66 Admission Reviewed    Imaging:    Imaging Reports Reviewed Today Include: no new       Recent Cultures (last 7 days):           Last 24 Hours Medication List:   Current Facility-Administered Medications   Medication Dose Route Frequency Provider Last Rate    aspirin  81 mg Oral Daily Guillermina Mattson MD      enoxaparin  40 mg Subcutaneous Daily Guillermina Mattson MD      insulin glargine  20 Units Subcutaneous HS Guillermina Mattson MD      insulin lispro  1-5 Units Subcutaneous TID [facility] Guillermina Mattson MD      insulin lispro  6 Units Subcutaneous Daily With Breakfast Guillermina Mattson MD      insulin lispro  6 Units Subcutaneous Daily With Lunch Guillermina Mattson MD      insulin lispro  6 Units Subcutaneous Daily With Arturo Jesus MD      losartan  100 mg Oral Daily Guillermina Mattson MD      melatonin  3 mg Oral HS MD Mere Quinteros ANTIFUNGAL   Topical BID Lam Welch MD      nystatin   Topical BID Guillermina Mattson MD      pravastatin  40 mg Oral Daily With Arturo Jesus MD      QUEtiapine  12 5 mg Oral HS Francisco Shoulders, PA-C      sertraline  50 mg Oral Daily Guillermina Mattson MD          Today, Patient Was Seen By: Gladys Rowley Tomasz Malhotra MD    ** Please Note: Dictation voice to text software may have been used in the creation of this document   **

## 2021-06-09 NOTE — ASSESSMENT & PLAN NOTE
· Dementia with ambulatory dysfunction  Spouse unable to care for him anymore  · PT/OT recommending rehab  Family agreeable    · Insurance denied placement, will proceed with Peer to peer review

## 2021-06-09 NOTE — CASE MANAGEMENT
CM follow up  Pt is clear for discharge and able to transfer to SNF for STR today  Pt accepted to [facility]   time 1900  Raheel  Pt will transfer via BLS  CM informed medical team and spouse of same  Medical necessity and IMM complete  AM will continue to follow Pt through and until discharge to assess for additional needs prior to d/c

## 2021-06-09 NOTE — NURSING NOTE
Report called to [facility] and spoke with Rosalinda Khan   Wife present at time of  patient transported via stretcher

## 2021-06-09 NOTE — PHYSICAL THERAPY NOTE
PHYSICAL THERAPY NOTE          Patient Name: Kati CASTLE Date: 6/9/2021   Time: 8686-1164       06/09/21 1215   PT Last Visit   PT Visit Date 06/09/21   Note Type   Note Type Treatment   Pain Assessment   Pain Assessment Tool Pain Assessment not indicated - pt denies pain   Pain Score No Pain   Restrictions/Precautions   Other Precautions Cognitive; Chair Alarm; Bed Alarm; Fall Risk   General   Chart Reviewed Yes   Response to Previous Treatment Patient with no complaints from previous session  ;Patient unable to report, no changes reported from family or staff   Cognition   Overall Cognitive Status Impaired   Arousal/Participation Cooperative   Attention Attends with cues to redirect   Orientation Level Oriented to person;Oriented to place;Oriented to time;Disoriented to situation   Memory Decreased recall of precautions;Decreased recall of recent events;Decreased short term memory;Decreased long term memory   Following Commands Follows one step commands inconsistently   Comments hx dementia  pleasantly confused  impaired processing and motor planning   Subjective   Subjective "Hopefully I can go home tomorrow!"   Transfers   Sit to Stand 3  Moderate assistance   Additional items Assist x 1; Armrests; Increased time required;Verbal cues; Other  (RW)   Stand to Sit 4  Minimal assistance   Additional items Assist x 1; Armrests; Increased time required;Verbal cues; Other  (RW)   Stand pivot 4  Minimal assistance   Additional items Assist x 1; Increased time required;Verbal cues; Other  (RW)   Additional Comments increased difficulty standing from lower height surfaces   Ambulation/Elevation   Gait pattern Forward Flexion; Inconsistent francis; Short stride   Gait Assistance 4  Minimal assist  (CGA for safety)   Additional items Assist x 1;Verbal cues  (cues for guidance)   Assistive Device Rolling walker   Distance 60', 120', 40', 120' w standing breaks in between   Stair Management Assistance Not tested   Balance   Static Standing Fair -   Dynamic Standing Poor +   Ambulatory Poor +   Endurance Deficit   Endurance Deficit No   Activity Tolerance   Activity Tolerance Patient tolerated treatment well;Treatment limited secondary to medical complications (Comment)  (dementia/cognition)   Nurse Made Aware Rebecca TILLMAN   Exercises   Knee AROM Long Arc Quad Sitting;10 reps;Bilateral  (2x10)   Ankle Pumps Sitting;10 reps;Bilateral  (1x10)   Assessment   Prognosis Fair   Problem List Decreased strength; Impaired balance;Decreased mobility; Decreased cognition;Decreased safety awareness; Impaired judgement   Assessment Mortimer Scarlet was seen for a f/u session per PT POC  Pt continues to present w cognitive deficits impacting performance and progress in therapy  This session focused on ambulatory endurance and incorporating cognitive tasks into activity  Pt w difficulty transitioning from task to task, gets stuck and perseverates on prev activity  Forgets task at hand and requires frequent redirection  Otherwise able to ambulate community distances w no difficulty  Continues to require min-mod Ax1 during session given impaired processing impacting ability to initiate transfers and fall risk during mobility  End of session pt seated OOB, wife present  Barriers to Discharge Decreased caregiver support   Barriers to Discharge Comments caregiver burden   Goals   Patient Goals go home tomorrow   STG Expiration Date 06/17/21   Short Term Goal #1 1)  Pt will perform bed mobility from flat bed with S demonstrating appropriate technique 100% of the time in order to improve function  2)  Perform all transfers with S demonstrating safe and appropriate technique 100% of the time in order to improve ability to negotiate safely in home environment  3) Amb with least restrictive AD > 50'x1 with S in order to demonstrate ability to negotiate in home environment  4)  Improve overall strength and balance 1/2 grade in order to optimize ability to perform functional tasks and reduce fall risk  5) Increase activity tolerance to 45 minutes in order to improve endurance to functional tasks  6)  Negotiate stairs using most appropriate technique and min A in order to be able to negotiate safely in home environment  7) PT for ongoing patient and family/caregiver education, DME needs and d/c planning in order to promote highest level of function in least restrictive environment  PT Treatment Day 4   Plan   Treatment/Interventions Functional transfer training;LE strengthening/ROM; Elevations; Therapeutic exercise;Cognitive reorientation;Patient/family training;Equipment eval/education; Bed mobility;Gait training;Continued evaluation;Spoke to nursing;Family   Progress Slow progress, cognitive deficits   PT Frequency Other (Comment)  (4-5x)   Recommendation   PT Discharge Recommendation Post acute rehabilitation services  (may benefit from transition to LTC pending progress)   PT - OK to Discharge Yes   Additional Comments The patient's AM-PAC Basic Mobility Inpatient Short Form Raw Score is 15, Standardized Score is 36 97  A standardized score less than 42 9 suggests the patient may benefit from discharge to post-acute rehabilitation services  Please also refer to the recommendation of the Physical Therapist for safe discharge planning     AM-PAC Basic Mobility Inpatient   Turning in Bed Without Bedrails 3   Lying on Back to Sitting on Edge of Flat Bed 2   Moving Bed to Chair 3   Standing Up From Chair 2   Walk in Room 3   Climb 3-5 Stairs 2   Basic Mobility Inpatient Raw Score 15   Basic Mobility Standardized Score 36 97     Delmis Bates, PT

## 2021-06-09 NOTE — ASSESSMENT & PLAN NOTE
Lab Results   Component Value Date    HGBA1C 9 2 (A) 10/13/2020     Results from last 7 days   Lab Units 06/09/21  1058 06/09/21  0603 06/08/21  2211 06/08/21  1552 06/08/21  1054 06/08/21  0605 06/07/21  2036 06/07/21  1555   POC GLUCOSE mg/dl 297* 155* 150* 232* 263* 184* 78 165*     · Diabetes mellitus with hyperglycemia on admission  Continue Glargine to 25 u HS standing lispro 6 units t i d  blood glucose fairly controlled  Will monitor to make further adjustments     · Holding glimepiride and acarbose----> discontinue acarbose upon discharge

## 2021-06-09 NOTE — PLAN OF CARE
Problem: OCCUPATIONAL THERAPY ADULT  Goal: Performs self-care activities at highest level of function for planned discharge setting  See evaluation for individualized goals  Description: Treatment Interventions: ADL retraining, Functional transfer training, UE strengthening/ROM, Cognitive reorientation, Endurance training, Patient/family training, Equipment evaluation/education, Compensatory technique education, Energy conservation, Activityengagement          See flowsheet documentation for full assessment, interventions and recommendations  Outcome: Progressing  Note: Limitation: Decreased ADL status, Decreased cognition, Decreased UE strength, Decreased Safe judgement during ADL, Decreased endurance, Decreased self-care trans, Decreased high-level ADLs  Prognosis: Good  Assessment: Pt was able to tolerate functional transfers, bed mobility, self care tasks, LE dressing, shaving and review of call bell orientation and fall prevention  Pt with improvement with LE dressing this tx session  Hands on A continue to be required to maintain safe balance with functional tasks instance  See above levels of A required for all functional tasks  Pt able to tolerate OOB positioning with chair alarm activated upon termination of tx session  All findings reported to nursing staff  The patient's raw score on the AM-PAC Daily Activity inpatient short form is 15, standardized score is 34 69, less than 39 4  Patients at this level are likely to benefit from discharge to post-acute rehabilitation services  Recommendation: Geriatric Consult  OT Discharge Recommendation: Post acute rehabilitation services  OT - OK to Discharge:  Yes

## 2021-06-09 NOTE — PLAN OF CARE
Problem: PHYSICAL THERAPY ADULT  Goal: Performs mobility at highest level of function for planned discharge setting  See evaluation for individualized goals  Description: Treatment/Interventions: Functional transfer training, LE strengthening/ROM, Elevations, Therapeutic exercise, Cognitive reorientation, Patient/family training, Equipment eval/education, Bed mobility, Gait training, Continued evaluation, Spoke to nursing, Family, OT, Spoke to case management          See flowsheet documentation for full assessment, interventions and recommendations  Outcome: Progressing  Note: Prognosis: Fair  Problem List: Decreased strength, Impaired balance, Decreased mobility, Decreased cognition, Decreased safety awareness, Impaired judgement  Assessment: Laura Barajas was seen for a f/u session per PT POC  Pt continues to present w cognitive deficits impacting performance and progress in therapy  This session focused on ambulatory endurance and incorporating cognitive tasks into activity  Pt w difficulty transitioning from task to task, gets stuck and perseverates on prev activity  Forgets task at hand and requires frequent redirection  Otherwise able to ambulate community distances w no difficulty  Continues to require min-mod Ax1 during session given impaired processing impacting ability to initiate transfers and fall risk during mobility  End of session pt seated OOB, wife present  Barriers to Discharge: Decreased caregiver support  Barriers to Discharge Comments: caregiver burden     PT Discharge Recommendation: Post acute rehabilitation services(may benefit from transition to LTC pending progress)     PT - OK to Discharge: Yes    See flowsheet documentation for full assessment

## 2021-06-09 NOTE — OCCUPATIONAL THERAPY NOTE
Occupational Therapy Treatment Note:         06/09/21 1150   OT Last Visit   OT Visit Date 06/09/21   Note Type   Note Type Treatment   Restrictions/Precautions   Weight Bearing Precautions Per Order No   Other Precautions Pain; Fall Risk;Cognitive; Chair Alarm; Bed Alarm   Pain Assessment   Pain Assessment Tool 0-10   Pain Score No Pain   Pain Location/Orientation Location: Hip   ADL   Where Assessed Chair   Grooming Assistance 5  Supervision/Setup   Grooming Deficit Setup;Verbal cueing; Increased time to complete;Supervision/safety;Brushing hair;Wash/dry face; Wash/dry hands;Shaving   Grooming Comments step by step verbal cues required to attend to tasks  LB Dressing Assistance 3  Moderate Assistance   LB Dressing Deficit Steadying;Setup;Supervision/safety;Verbal cueing; Increased time to complete; Don/doff L sock; Don/doff R sock   LB Dressing Comments cues for safety required hands on A for clothing management instance  Toileting Assistance  Unable to assess   Toileting Deficit Increased time to complete;Supervison/safety;Verbal cueing;Use of bedpan/urinal setup   Toileting Comments Pt with need for cues to prevent spillage  Functional Standing Tolerance   Time 3 mins   Activity dynamic stand balance activities  Comments no LOB with functional tasks instance  Pt with improved carry over of RW safety  Bed Mobility   Supine to Sit 4  Minimal assistance   Additional items Assist x 2;Bedrails; Increased time required;Verbal cues;LE management   Additional Comments Limited motor planning initially noted    Transfers   Sit to Stand 4  Minimal assistance   Additional items Assist x 1   Stand to Sit 4  Minimal assistance   Additional items Assist x 1; Armrests; Increased time required;Verbal cues; Bedrails   Stand pivot 4  Minimal assistance   Additional items Assist x 1;Bedrails;Armrests; Increased time required;Verbal cues   Additional Comments cues required with good carry over noted this tx session    Functional Mobility   Functional Mobility 4  Minimal assistance   Additional Comments x1   Additional items Rolling walker   Cognition   Overall Cognitive Status Impaired   Arousal/Participation Cooperative   Attention Attends with cues to redirect   Orientation Level Oriented to person;Disoriented to place; Disoriented to time;Disoriented to situation   Memory Decreased short term memory;Decreased recall of recent events;Decreased recall of precautions   Following Commands Follows one step commands without difficulty   Comments Pt able to follow repetitive commands this tx sesson  Additional Activities   Additional Activities Other (Comment)  (reviewed fall prevention/call bell orienation  )   Additional Activities Comments Pt stated, "yes, I know"  Activity Tolerance   Activity Tolerance Patient tolerated treatment well   Medical Staff Made Aware reported all findings to nursing staff  Assessment   Assessment Pt was able to tolerate functional transfers, bed mobility, self care tasks, LE dressing, shaving and review of call bell orientation and fall prevention  Pt with improvement with LE dressing this tx session  Hands on A continue to be required to maintain safe balance with functional tasks instance  See above levels of A required for all functional tasks  Pt able to tolerate OOB positioning with chair alarm activated upon termination of tx session  All findings reported to nursing staff  The patient's raw score on the AM-PAC Daily Activity inpatient short form is 15, standardized score is 34 69, less than 39 4  Patients at this level are likely to benefit from discharge to post-acute rehabilitation services  Plan   Treatment Interventions ADL retraining;Functional transfer training;UE strengthening/ROM; Endurance training;Cognitive reorientation;Patient/family training   Goal Expiration Date 06/17/21   OT Treatment Day 4   OT Frequency 3-5x/wk   Recommendation   Recommendation Geriatric Consult   OT Discharge Recommendation Post acute rehabilitation services   OT - OK to Discharge Yes   AM-PAC Daily Activity Inpatient   Lower Body Dressing 2   Bathing 2   Toileting 2   Upper Body Dressing 3   Grooming 3   Eating 3   Daily Activity Raw Score 15   Daily Activity Standardized Score (Calc for Raw Score >=11) 34 69   AM-PAC Applied Cognition Inpatient   Following a Speech/Presentation 2   Understanding Ordinary Conversation 2   Taking Medications 1   Remembering Where Things Are Placed or Put Away 1   Remembering List of 4-5 Errands 1   Taking Care of Complicated Tasks 1   Applied Cognition Raw Score 8   Applied Cognition Standardized Score 19 32   Macy [place]ell, [address]

## 2021-06-09 NOTE — TRANSPORTATION MEDICAL NECESSITY
Section I - General Information    Name of Patient: Paty To                 : 1935    Medicare #: [id number]  Transport Date: 21 (PCS is valid for round trips on this date and for all repetitive trips in the 60-day range as noted below )  Origin: [address]                                                         Destination: [facility]  Is the pt's stay covered under Medicare Part A (PPS/DRG)   [x]     Closest appropriate facility? If no, why is transport to more distant facility required? Yes  If hospice pt, is this transport related to pt's terminal illness? NA       Section II - Medical Necessity Questionnaire  Ambulance transportation is medically necessary only if other means of transport are contraindicated or would be potentially harmful to the patient  To meet this requirement, the patient must either be "bed confined" or suffer from a condition such that transport by means other than ambulance is contraindicated by the patient's condition  The following questions must be answered by the medical professional signing below for this form to be valid:    1)  Describe the MEDICAL CONDITION (physical and/or mental) of this patient AT [address] [place] [address] that requires the patient to be transported in an ambulance and why transport by other means is contraindicated by the patient's condition: DEMENTIA    2) Is the patient "bed confined" as defined below? No  To be "be confined" the patient must satisfy all three of the following conditions: (1) unable to get up from bed without Assistance; AND (2) unable to ambulate; AND (3) unable to sit in a chair or wheelchair  3) Can this patient safely be transported by car or wheelchair van (i e , seated during transport without a medical attendant or monitoring)?    No    4) In addition to completing questions 1-3 above, please check any of the following conditions that apply*:   *Note: supporting documentation for any boxes checked must be maintained in the patient's medical records  If hosp-hosp transfer, describe services needed at 2nd facility not available at 1st facility? Patient is confused  Medical attendant required   Unable to tolerate seated position for time needed to transport       Section III - Signature of Physician or Healthcare Professional  I certify that the above information is true and correct based on my evaluation of this patient, and represent that the patient requires transport by ambulance and that other forms of transport are contraindicated  I understand that this information will be used by the Centers for Medicare and Medicaid Services (CMS) to support the determination of medical necessity for ambulance services, and I represent that I have personal knowledge of the patient's condition at time of transport  []  If this box is checked, I also certify that the patient is physically or mentally incapable of signing the ambulance service's claim and that the institution with which I am affiliated has furnished care, services, or assistance to the patient  My signature below is made on behalf of the patient pursuant to 42 CFR §424 36(b)(4)  In accordance with 42 CFR §424 37, the specific reason(s) that the patient is physically or mentally incapable of signing the claim form is as follows:       Signature of Physician* or Healthcare Professional____________________________________________    Signature Date 06/09/21 (For scheduled repetitive transports, this form is not valid for transports performed more than 60 days after this date)    Printed Name & Credentials of Physician or Healthcare Professional (MD, DO, RN, etc )  Adelaida Meigs, LSW    *Form must be signed by patient's attending physician for scheduled, repetitive transports   For non-repetitive, unscheduled ambulance transports, if unable to obtain the signature of the attending physician, any of the following may sign (choose appropriate option below)  [] Physician Assistant []  Clinical Nurse Specialist []  Registered Nurse  []  Nurse Practitioner  [x] Discharge Planner

## 2021-06-10 ENCOUNTER — TRANSITIONAL CARE MANAGEMENT (OUTPATIENT)
Dept: FAMILY MEDICINE CLINIC | Facility: CLINIC | Age: 86
End: 2021-06-10

## 2021-06-10 NOTE — DISCHARGE SUMMARY
* Dementia without behavioral disturbance (HCC)  Assessment & Plan  · Dementia with ambulatory dysfunction  Spouse unable to care for him anymore  · PT/OT recommending rehab  Family agreeable  · Insurance denied placement, will proceed with Peer to peer review      Type 2 diabetes mellitus with stage 3 chronic kidney disease, with long-term current use of insulin [facility])  Assessment & Plan        Lab Results   Component Value Date     HGBA1C 9 2 (A) 10/13/2020                  Results from last 7 days   Lab Units 06/09/21  1058 06/09/21  0603 06/08/21  2211 06/08/21  1552 06/08/21  1054 06/08/21  0605 06/07/21  2036 06/07/21  1555   POC GLUCOSE mg/dl 297* 155* 150* 232* 263* 184* 78 165*      · Diabetes mellitus with hyperglycemia on admission  Continue Glargine to 25 u HS standing lispro 6 units t i d  blood glucose fairly controlled  Will monitor to make further adjustments  · Holding glimepiride and acarbose----> discontinue acarbose upon discharge     Chronic kidney disease, stage 3 [facility])  Assessment & Plan        Lab Results   Component Value Date     EGFR 52 06/03/2021     EGFR 48 06/02/2021     EGFR 41 04/09/2021     CREATININE 1 25 06/03/2021     CREATININE 1 33 (H) 06/02/2021     CREATININE 1 53 (H) 04/09/2021      Stable at baseline       Essential hypertension  Assessment & Plan  · BP is acceptable   Will continue losartan     Mixed hyperlipidemia  Assessment & Plan  · Continue pravastatin     Pressure ulcer of right buttock, stage 2   POA, treated with wound care consult, wound care and repositioning  Findings:6/4 Wound care- " POA stage 2 pressure injury on right buttock with surrounding fungal rash"     Discharging Physician / Practitioner: Hiral Kendall MD  PCP: Richar Delgado MD  Admission Date:   Admission Orders (From admission, onward)     Ordered        06/02/21 1634  Inpatient Admission  Once                   Discharge Date: 06/9/21    Resolved Problems  Date Reviewed: 6/9/2021 None          Consultations During Hospital Stay:  · PT, OT, CM    Procedures Performed:   No new     Significant Findings / Test Results:           Review chart      Incidental Findings:   · Nnoe     Test Results Pending at Discharge (will require follow up): · None     Outpatient Tests Requested:  · None    Complications:  None    Reason for Admission: ambulatory dysfunction    Hospital Course:     Shirley Jonas is a 80 y o  male patient with history of dementia who originally presented to the hospital on 6/2/2021 due to worsening ambulatory dysfunction  It was noted that patient's wife who is also elderly, was having difficulty assisting the patient at home at his current ambulatory status  The patient was evaluated and treated with physical and occupational therapy and noted for improvement  It was deemed that he would benefit from additional therapies to return to his prior baseline  He was subsequently accepted at rehab and discharged in improved and stable condition  Please see above list of diagnoses and related plan for additional information  Condition at Discharge: stable     Discharge Day Visit / Exam:     Subjective:  Patient seen and examined  He appears comfortable  He worked with PT and OT  No complaints  Vitals: Blood Pressure: 119/69 (06/09/21 1521)  Pulse: 73 (06/09/21 1521)  Temperature: (!) 97 3 °F (36 3 °C) (06/09/21 1521)  Temp Source: Temporal (06/09/21 1521)  Respirations: 16 (06/09/21 1521)  Height: 5' 7" (170 2 cm) (06/03/21 2100)  Weight - Scale: 78 9 kg (174 lb) (06/03/21 2100)  SpO2: 98 % (06/09/21 1521)  Exam:   Physical Exam  Constitutional:       General: He is not in acute distress  HENT:      Head: Normocephalic and atraumatic  Nose: No congestion  Mouth/Throat:      Pharynx: Oropharynx is clear  Eyes:      Conjunctiva/sclera: Conjunctivae normal    Cardiovascular:      Rate and Rhythm: Normal rate and regular rhythm  Heart sounds:  No murmur  Pulmonary:      Effort: No respiratory distress  Breath sounds: No wheezing or rales  Abdominal:      General: There is no distension  Tenderness: There is no abdominal tenderness  There is no guarding  Musculoskeletal:      Right lower leg: No edema  Left lower leg: No edema  Skin:     General: Skin is warm and dry  Neurological:      Mental Status: Mental status is at baseline  Psychiatric:         Mood and Affect: Mood normal          Discussion with Family: wife at bedside    Discharge instructions/Information to patient and family:   See after visit summary for information provided to patient and family  Provisions for Follow-Up Care:  See after visit summary for information related to follow-up care and any pertinent home health orders  Disposition:     Other [facility] to [facility] SNF:   · Not Applicable to this Patient - Not Applicable to this Patient    Planned Readmission: No     Discharge Statement:  I spent 35 minutes discharging the patient  This time was spent on the day of discharge  I had direct contact with the patient on the day of discharge  Greater than 50% of the total time was spent examining patient, answering all patient questions, arranging and discussing plan of care with patient as well as directly providing post-discharge instructions  Additional time then spent on discharge activities  Discharge Medications:  See after visit summary for reconciled discharge medications provided to patient and family        ** Please Note: This note has been constructed using a voice recognition system **

## 2021-08-18 NOTE — ASSESSMENT & PLAN NOTE
8/18/2021  IJustyn MD, saw and evaluated the patient  I have discussed the patient with the resident/non-physician practitioner and agree with the resident's/non-physician practitioner's findings, Plan of Care, and MDM as documented in the resident's/non-physician practitioner's note, except where noted  All available labs and Radiology studies were reviewed  I was present for key portions of any procedure(s) performed by the resident/non-physician practitioner and I was immediately available to provide assistance  At this point I agree with the current assessment done in the Emergency Department  I have conducted an independent evaluation of this patient a history and physical is as follows:    ED Course     Inversion injury to left ankle  Patient presenting with pain over lateral malleolus  On examination mild edema over the lateral malleolus  Tender over the lateral malleolus  Nontender to palpation over the proximal fibula  Nontender to palpation over the base of the 5th metatarsal or the navicular bone  Plan is x-ray  XR ankle 3+ views LEFT   ED Interpretation   No fracture  Final Result      Normal left ankle  Workstation performed: [id number]             Patient placed in ankle stirrup splint with crutches    Critical Care Time  Procedures · Continue pravastatin